# Patient Record
Sex: FEMALE | Race: OTHER | NOT HISPANIC OR LATINO | URBAN - METROPOLITAN AREA
[De-identification: names, ages, dates, MRNs, and addresses within clinical notes are randomized per-mention and may not be internally consistent; named-entity substitution may affect disease eponyms.]

---

## 2017-01-12 ENCOUNTER — OUTPATIENT (OUTPATIENT)
Dept: OUTPATIENT SERVICES | Facility: HOSPITAL | Age: 82
LOS: 1 days | End: 2017-01-12
Payer: MEDICARE

## 2017-01-12 PROCEDURE — 77067 SCR MAMMO BI INCL CAD: CPT

## 2017-01-12 PROCEDURE — G0202: CPT | Mod: 26

## 2017-03-28 ENCOUNTER — APPOINTMENT (OUTPATIENT)
Dept: HEART AND VASCULAR | Facility: CLINIC | Age: 82
End: 2017-03-28

## 2017-03-28 VITALS
WEIGHT: 155 LBS | SYSTOLIC BLOOD PRESSURE: 140 MMHG | DIASTOLIC BLOOD PRESSURE: 70 MMHG | HEIGHT: 60 IN | HEART RATE: 71 BPM | BODY MASS INDEX: 30.43 KG/M2

## 2017-06-26 ENCOUNTER — APPOINTMENT (OUTPATIENT)
Dept: PULMONOLOGY | Facility: CLINIC | Age: 82
End: 2017-06-26

## 2017-07-25 ENCOUNTER — APPOINTMENT (OUTPATIENT)
Dept: VASCULAR SURGERY | Facility: CLINIC | Age: 82
End: 2017-07-25

## 2017-07-28 ENCOUNTER — OUTPATIENT (OUTPATIENT)
Dept: OUTPATIENT SERVICES | Facility: HOSPITAL | Age: 82
LOS: 1 days | End: 2017-07-28
Payer: MEDICARE

## 2017-07-28 DIAGNOSIS — I25.9 CHRONIC ISCHEMIC HEART DISEASE, UNSPECIFIED: ICD-10-CM

## 2017-07-28 DIAGNOSIS — R60.9 EDEMA, UNSPECIFIED: ICD-10-CM

## 2017-07-28 PROCEDURE — 93306 TTE W/DOPPLER COMPLETE: CPT | Mod: 26

## 2017-07-28 PROCEDURE — 93306 TTE W/DOPPLER COMPLETE: CPT

## 2017-07-28 PROCEDURE — 93005 ELECTROCARDIOGRAM TRACING: CPT

## 2017-07-28 PROCEDURE — 93010 ELECTROCARDIOGRAM REPORT: CPT

## 2017-08-29 ENCOUNTER — APPOINTMENT (OUTPATIENT)
Dept: HEART AND VASCULAR | Facility: CLINIC | Age: 82
End: 2017-08-29

## 2017-08-29 ENCOUNTER — OUTPATIENT (OUTPATIENT)
Dept: OUTPATIENT SERVICES | Facility: HOSPITAL | Age: 82
LOS: 1 days | End: 2017-08-29
Payer: MEDICARE

## 2017-08-29 PROCEDURE — 77080 DXA BONE DENSITY AXIAL: CPT

## 2017-08-29 PROCEDURE — 77080 DXA BONE DENSITY AXIAL: CPT | Mod: 26

## 2017-10-31 ENCOUNTER — APPOINTMENT (OUTPATIENT)
Dept: HEART AND VASCULAR | Facility: CLINIC | Age: 82
End: 2017-10-31
Payer: MEDICARE

## 2017-10-31 PROCEDURE — 93280 PM DEVICE PROGR EVAL DUAL: CPT

## 2017-10-31 PROCEDURE — 99214 OFFICE O/P EST MOD 30 MIN: CPT | Mod: 25

## 2017-10-31 RX ORDER — TOBRAMYCIN 3 MG/ML
0.3 SOLUTION/ DROPS OPHTHALMIC
Qty: 5 | Refills: 0 | Status: ACTIVE | COMMUNITY
Start: 2017-05-05

## 2017-12-05 ENCOUNTER — APPOINTMENT (OUTPATIENT)
Dept: PULMONOLOGY | Facility: CLINIC | Age: 82
End: 2017-12-05
Payer: MEDICARE

## 2017-12-05 PROCEDURE — 99215 OFFICE O/P EST HI 40 MIN: CPT | Mod: 25

## 2018-01-24 ENCOUNTER — APPOINTMENT (OUTPATIENT)
Dept: ULTRASOUND IMAGING | Facility: HOSPITAL | Age: 83
End: 2018-01-24

## 2018-01-24 ENCOUNTER — APPOINTMENT (OUTPATIENT)
Dept: RADIOLOGY | Facility: CLINIC | Age: 83
End: 2018-01-24

## 2018-03-05 ENCOUNTER — APPOINTMENT (OUTPATIENT)
Dept: PULMONOLOGY | Facility: CLINIC | Age: 83
End: 2018-03-05
Payer: MEDICARE

## 2018-03-05 PROCEDURE — 94727 GAS DIL/WSHOT DETER LNG VOL: CPT

## 2018-03-05 PROCEDURE — ZZZZZ: CPT

## 2018-03-05 PROCEDURE — 94729 DIFFUSING CAPACITY: CPT

## 2018-03-05 PROCEDURE — 99214 OFFICE O/P EST MOD 30 MIN: CPT | Mod: 25

## 2018-03-05 PROCEDURE — 94010 BREATHING CAPACITY TEST: CPT

## 2018-03-06 ENCOUNTER — APPOINTMENT (OUTPATIENT)
Dept: ULTRASOUND IMAGING | Facility: HOSPITAL | Age: 83
End: 2018-03-06
Payer: MEDICARE

## 2018-03-06 ENCOUNTER — OUTPATIENT (OUTPATIENT)
Dept: OUTPATIENT SERVICES | Facility: HOSPITAL | Age: 83
LOS: 1 days | End: 2018-03-06
Payer: COMMERCIAL

## 2018-03-06 PROCEDURE — 76536 US EXAM OF HEAD AND NECK: CPT

## 2018-03-06 PROCEDURE — 71046 X-RAY EXAM CHEST 2 VIEWS: CPT | Mod: 26

## 2018-03-06 PROCEDURE — 71046 X-RAY EXAM CHEST 2 VIEWS: CPT

## 2018-03-06 PROCEDURE — 76536 US EXAM OF HEAD AND NECK: CPT | Mod: 26

## 2018-03-22 ENCOUNTER — OUTPATIENT (OUTPATIENT)
Dept: OUTPATIENT SERVICES | Facility: HOSPITAL | Age: 83
LOS: 1 days | End: 2018-03-22
Payer: MEDICARE

## 2018-03-22 ENCOUNTER — APPOINTMENT (OUTPATIENT)
Dept: CT IMAGING | Facility: HOSPITAL | Age: 83
End: 2018-03-22

## 2018-03-22 PROCEDURE — 71250 CT THORAX DX C-: CPT

## 2018-03-22 PROCEDURE — 71250 CT THORAX DX C-: CPT | Mod: 26

## 2018-03-29 ENCOUNTER — APPOINTMENT (OUTPATIENT)
Dept: PULMONOLOGY | Facility: CLINIC | Age: 83
End: 2018-03-29
Payer: MEDICARE

## 2018-03-29 PROCEDURE — 99215 OFFICE O/P EST HI 40 MIN: CPT | Mod: 25

## 2018-05-08 ENCOUNTER — APPOINTMENT (OUTPATIENT)
Dept: HEART AND VASCULAR | Facility: CLINIC | Age: 83
End: 2018-05-08
Payer: MEDICARE

## 2018-05-08 VITALS
WEIGHT: 168 LBS | HEIGHT: 60 IN | SYSTOLIC BLOOD PRESSURE: 146 MMHG | BODY MASS INDEX: 32.98 KG/M2 | HEART RATE: 85 BPM | DIASTOLIC BLOOD PRESSURE: 70 MMHG

## 2018-05-08 PROCEDURE — 93280 PM DEVICE PROGR EVAL DUAL: CPT

## 2018-05-22 ENCOUNTER — APPOINTMENT (OUTPATIENT)
Dept: ULTRASOUND IMAGING | Facility: HOSPITAL | Age: 83
End: 2018-05-22

## 2018-05-22 ENCOUNTER — APPOINTMENT (OUTPATIENT)
Dept: MAMMOGRAPHY | Facility: HOSPITAL | Age: 83
End: 2018-05-22

## 2018-05-23 ENCOUNTER — OUTPATIENT (OUTPATIENT)
Dept: OUTPATIENT SERVICES | Facility: HOSPITAL | Age: 83
LOS: 1 days | End: 2018-05-23
Payer: MEDICARE

## 2018-05-23 DIAGNOSIS — I25.10 ATHEROSCLEROTIC HEART DISEASE OF NATIVE CORONARY ARTERY WITHOUT ANGINA PECTORIS: ICD-10-CM

## 2018-05-23 DIAGNOSIS — R07.9 CHEST PAIN, UNSPECIFIED: ICD-10-CM

## 2018-05-23 DIAGNOSIS — R60.9 EDEMA, UNSPECIFIED: ICD-10-CM

## 2018-05-23 PROCEDURE — 93005 ELECTROCARDIOGRAM TRACING: CPT

## 2018-05-23 PROCEDURE — 93306 TTE W/DOPPLER COMPLETE: CPT | Mod: 26

## 2018-05-23 PROCEDURE — 93306 TTE W/DOPPLER COMPLETE: CPT

## 2018-05-23 PROCEDURE — 93010 ELECTROCARDIOGRAM REPORT: CPT

## 2018-08-23 ENCOUNTER — RESULT CHARGE (OUTPATIENT)
Age: 83
End: 2018-08-23

## 2018-08-24 ENCOUNTER — APPOINTMENT (OUTPATIENT)
Dept: PULMONOLOGY | Facility: CLINIC | Age: 83
End: 2018-08-24
Payer: MEDICARE

## 2018-08-24 ENCOUNTER — NON-APPOINTMENT (OUTPATIENT)
Age: 83
End: 2018-08-24

## 2018-08-24 VITALS
SYSTOLIC BLOOD PRESSURE: 110 MMHG | DIASTOLIC BLOOD PRESSURE: 70 MMHG | BODY MASS INDEX: 32.79 KG/M2 | WEIGHT: 167 LBS | TEMPERATURE: 98.9 F | OXYGEN SATURATION: 96 % | HEIGHT: 60 IN | HEART RATE: 86 BPM

## 2018-08-24 DIAGNOSIS — Z87.19 PERSONAL HISTORY OF OTHER DISEASES OF THE DIGESTIVE SYSTEM: ICD-10-CM

## 2018-08-24 DIAGNOSIS — Z87.448 PERSONAL HISTORY OF OTHER DISEASES OF URINARY SYSTEM: ICD-10-CM

## 2018-08-24 DIAGNOSIS — Z86.39 PERSONAL HISTORY OF OTHER ENDOCRINE, NUTRITIONAL AND METABOLIC DISEASE: ICD-10-CM

## 2018-08-24 DIAGNOSIS — Z87.11 PERSONAL HISTORY OF PEPTIC ULCER DISEASE: ICD-10-CM

## 2018-08-24 DIAGNOSIS — Z86.79 PERSONAL HISTORY OF OTHER DISEASES OF THE CIRCULATORY SYSTEM: ICD-10-CM

## 2018-08-24 PROCEDURE — 99397 PER PM REEVAL EST PAT 65+ YR: CPT | Mod: 25

## 2018-08-24 PROCEDURE — 36415 COLL VENOUS BLD VENIPUNCTURE: CPT

## 2018-08-24 PROCEDURE — 93000 ELECTROCARDIOGRAM COMPLETE: CPT

## 2018-08-24 NOTE — ASSESSMENT
[FreeTextEntry1] : Annual exam - Follow on blood work.  EKG NSR. She was sent for mammogram.  Pt compliant with diet and exercise.  \par Screenings due:\par - Colonoscopy 12/2019\par - Dexa 9/2019\par - US thyroid 3/2019\par -  PFT 3/2019\par -  CXR 3/2019\par \par Edema - stable no clinical evidence of DVT or CHF.  There is slight swelling to left ankle and uses Lasix as needed.  She is on pradaxa.  \par \par Asthma - She is clinically stable and rarely requiring her JUDITH.  No need for systemic steroids.  \par \par Dyspnea with excretion - most likely related  to weight gain and deconditioning.  Pt to monitor diet and exercise.\par \par Ulcer - No signs of GI bleeding.  Follow by abbey Morales for diverticulosis \par \par Sarcoidosis - Stable PFT. No signs of exacerbation.  Follow with yearly PFTS. \par \par hypothyroidism - Cont with Synthroid and follow lab work\par \par Hyperlipidemia - Follow lab work and tolerating statin \par \par Afib - stable and currently on Pradaxa \par \par \par \par

## 2018-08-24 NOTE — END OF VISIT
[FreeTextEntry3] : patient seen and examined with LISA Wright [>50% of Time Spent on Counseling and Coordination of Care for  ___] : Greater than 50% of the encounter time was spent on counseling and coordination of care for [unfilled] [Time Spent: ___ minutes] : I have spent [unfilled] minutes of face to face time with the patient

## 2018-08-24 NOTE — HISTORY OF PRESENT ILLNESS
[FreeTextEntry1] : pt here for annual visit.  [de-identified] : She is doing well.  She is trying to watching her diet.  She does have SOB, she using the Proair as needed.  She uses it prior to going for walk.  She states the heat is making her more SOB.  She is avoiding NA.

## 2018-08-24 NOTE — HEALTH RISK ASSESSMENT
[No falls in past year] : Patient reported no falls in the past year [0] : 2) Feeling down, depressed, or hopeless: Not at all (0) [(PHQ-2) Unable to screen] : PHQ-2: unable to screen [] : No

## 2018-08-27 ENCOUNTER — OTHER (OUTPATIENT)
Age: 83
End: 2018-08-27

## 2018-08-27 ENCOUNTER — RESULT REVIEW (OUTPATIENT)
Age: 83
End: 2018-08-27

## 2018-08-27 LAB
25(OH)D3 SERPL-MCNC: 29.5 NG/ML
ALBUMIN SERPL ELPH-MCNC: 4.4 G/DL
ALP BLD-CCNC: 81 U/L
ALT SERPL-CCNC: 15 U/L
ANION GAP SERPL CALC-SCNC: 17 MMOL/L
APPEARANCE: CLEAR
AST SERPL-CCNC: 22 U/L
BASOPHILS # BLD AUTO: 0.04 K/UL
BASOPHILS NFR BLD AUTO: 0.5 %
BILIRUB SERPL-MCNC: 0.7 MG/DL
BILIRUBIN URINE: NEGATIVE
BLOOD URINE: NEGATIVE
BUN SERPL-MCNC: 12 MG/DL
CALCIUM SERPL-MCNC: 9.1 MG/DL
CHLORIDE SERPL-SCNC: 100 MMOL/L
CHOLEST SERPL-MCNC: 151 MG/DL
CHOLEST/HDLC SERPL: 2.3 RATIO
CO2 SERPL-SCNC: 22 MMOL/L
COLOR: YELLOW
CREAT SERPL-MCNC: 1.07 MG/DL
EOSINOPHIL # BLD AUTO: 0.25 K/UL
EOSINOPHIL NFR BLD AUTO: 3.3 %
GLUCOSE QUALITATIVE U: NEGATIVE MG/DL
GLUCOSE SERPL-MCNC: 97 MG/DL
HBA1C MFR BLD HPLC: 6.1 %
HCT VFR BLD CALC: 41.3 %
HDLC SERPL-MCNC: 65 MG/DL
HGB BLD-MCNC: 13.2 G/DL
IMM GRANULOCYTES NFR BLD AUTO: 0.3 %
KETONES URINE: NEGATIVE
LDLC SERPL CALC-MCNC: 69 MG/DL
LEUKOCYTE ESTERASE URINE: NEGATIVE
LYMPHOCYTES # BLD AUTO: 2.35 K/UL
LYMPHOCYTES NFR BLD AUTO: 31.2 %
MAN DIFF?: NORMAL
MCHC RBC-ENTMCNC: 30.4 PG
MCHC RBC-ENTMCNC: 32 GM/DL
MCV RBC AUTO: 95.2 FL
MONOCYTES # BLD AUTO: 0.71 K/UL
MONOCYTES NFR BLD AUTO: 9.4 %
NEUTROPHILS # BLD AUTO: 4.16 K/UL
NEUTROPHILS NFR BLD AUTO: 55.3 %
NITRITE URINE: NEGATIVE
PH URINE: 6
PLATELET # BLD AUTO: 285 K/UL
POTASSIUM SERPL-SCNC: 4.5 MMOL/L
PROT SERPL-MCNC: 6.9 G/DL
PROTEIN URINE: NEGATIVE MG/DL
RBC # BLD: 4.34 M/UL
RBC # FLD: 13.7 %
SODIUM SERPL-SCNC: 139 MMOL/L
SPECIFIC GRAVITY URINE: 1
T3 SERPL-MCNC: 101 NG/DL
T4 SERPL-MCNC: 11.1 UG/DL
TRIGL SERPL-MCNC: 84 MG/DL
TSH SERPL-ACNC: 1.19 UIU/ML
UROBILINOGEN URINE: NEGATIVE MG/DL
WBC # FLD AUTO: 7.53 K/UL

## 2018-09-24 ENCOUNTER — APPOINTMENT (OUTPATIENT)
Dept: PULMONOLOGY | Facility: CLINIC | Age: 83
End: 2018-09-24
Payer: MEDICARE

## 2018-09-24 VITALS
WEIGHT: 164 LBS | BODY MASS INDEX: 32.2 KG/M2 | TEMPERATURE: 98.9 F | HEIGHT: 60 IN | HEART RATE: 86 BPM | DIASTOLIC BLOOD PRESSURE: 60 MMHG | OXYGEN SATURATION: 97 % | SYSTOLIC BLOOD PRESSURE: 126 MMHG

## 2018-09-24 PROCEDURE — 99214 OFFICE O/P EST MOD 30 MIN: CPT

## 2018-09-26 ENCOUNTER — RX RENEWAL (OUTPATIENT)
Age: 83
End: 2018-09-26

## 2018-09-27 ENCOUNTER — RX RENEWAL (OUTPATIENT)
Age: 83
End: 2018-09-27

## 2018-09-27 RX ORDER — OFLOXACIN OTIC 3 MG/ML
0.3 SOLUTION AURICULAR (OTIC) TWICE DAILY
Qty: 1 | Refills: 0 | Status: DISCONTINUED | COMMUNITY
Start: 2018-09-26 | End: 2018-09-27

## 2018-09-30 LAB — RAPID RVP RESULT: NOT DETECTED

## 2018-10-31 ENCOUNTER — OUTPATIENT (OUTPATIENT)
Dept: OUTPATIENT SERVICES | Facility: HOSPITAL | Age: 83
LOS: 1 days | End: 2018-10-31
Payer: MEDICARE

## 2018-10-31 ENCOUNTER — APPOINTMENT (OUTPATIENT)
Dept: MAMMOGRAPHY | Facility: HOSPITAL | Age: 83
End: 2018-10-31
Payer: MEDICARE

## 2018-10-31 PROCEDURE — 77067 SCR MAMMO BI INCL CAD: CPT

## 2018-10-31 PROCEDURE — 77063 BREAST TOMOSYNTHESIS BI: CPT | Mod: 26

## 2018-10-31 PROCEDURE — 77067 SCR MAMMO BI INCL CAD: CPT | Mod: 26

## 2018-10-31 PROCEDURE — 77063 BREAST TOMOSYNTHESIS BI: CPT

## 2018-11-06 ENCOUNTER — RESULT REVIEW (OUTPATIENT)
Age: 83
End: 2018-11-06

## 2018-11-06 ENCOUNTER — OUTPATIENT (OUTPATIENT)
Dept: OUTPATIENT SERVICES | Facility: HOSPITAL | Age: 83
LOS: 1 days | End: 2018-11-06
Payer: MEDICARE

## 2018-11-06 ENCOUNTER — RX RENEWAL (OUTPATIENT)
Age: 83
End: 2018-11-06

## 2018-11-06 ENCOUNTER — APPOINTMENT (OUTPATIENT)
Dept: ULTRASOUND IMAGING | Facility: HOSPITAL | Age: 83
End: 2018-11-06

## 2018-11-06 ENCOUNTER — APPOINTMENT (OUTPATIENT)
Dept: HEART AND VASCULAR | Facility: CLINIC | Age: 83
End: 2018-11-06
Payer: MEDICARE

## 2018-11-06 VITALS — SYSTOLIC BLOOD PRESSURE: 152 MMHG | HEART RATE: 95 BPM | DIASTOLIC BLOOD PRESSURE: 77 MMHG

## 2018-11-06 PROCEDURE — 76641 ULTRASOUND BREAST COMPLETE: CPT

## 2018-11-06 PROCEDURE — 76641 ULTRASOUND BREAST COMPLETE: CPT | Mod: 26,50

## 2018-11-06 PROCEDURE — 77065 DX MAMMO INCL CAD UNI: CPT | Mod: 26,LT

## 2018-11-06 PROCEDURE — 99214 OFFICE O/P EST MOD 30 MIN: CPT | Mod: 25

## 2018-11-06 PROCEDURE — 93280 PM DEVICE PROGR EVAL DUAL: CPT

## 2018-11-06 PROCEDURE — 77065 DX MAMMO INCL CAD UNI: CPT

## 2018-11-06 NOTE — PROCEDURE
[No] : not [NSR] : normal sinus rhythm [Pacemaker] : pacemaker [Voltage: ___ volts] : Voltage was [unfilled] volts [Lead Imp:  ___ohms] : lead impedance was [unfilled] ohms [Sensing Amplitude ___mv] : sensing amplitude was [unfilled] mv [___V @] : [unfilled] V [___ ms] : [unfilled] ms [None] : none [Asense-Vsense ___ %] : Asense-Vsense [unfilled]% [Apace-Vsense ___ %] : Apace-Vsense [unfilled]% [de-identified] : Medtronic [de-identified] : Ana [de-identified] : 12/17/12 [de-identified] : AAIR-DDDR [de-identified] : 53-74 [de-identified] : longevity 5.5 y [de-identified] : 1 PAF episode 11/4/18 - 7 hours duration- only AEGM available\par Reprogrammed for [facility] EGMs

## 2018-11-06 NOTE — PHYSICAL EXAM
[General Appearance - Well Developed] : well developed [Normal Appearance] : normal appearance [Well Groomed] : well groomed [General Appearance - Well Nourished] : well nourished [No Deformities] : no deformities [General Appearance - In No Acute Distress] : no acute distress [Left Infraclavicular] : left infraclavicular area [Clean] : clean [Dry] : dry [Well-Healed] : well-healed [Palpable Crepitus] : no palpable crepitus [Bleeding] : no active bleeding [Foul Odor] : no foul smell [Purulent Drainage] : no purulent drainage [Serosanguineous Drainage] : no serosanquineous drainage [Serous Drainage] : no serous drainage [Erythema] : not erythematous [Warm] : not warm [Tender] : not tender [Indurated] : not indurated [Fluctuant] : not fluctuant

## 2018-11-06 NOTE — REASON FOR VISIT
[Follow-up Device Check] : follow-up device check visit [Arrhythmias (seen on stored data)] : arrhythmias seen on stored data

## 2018-11-06 NOTE — HISTORY OF PRESENT ILLNESS
[de-identified] : This is an 83 year old female with SSS S/P PPM, HTN, Afib, CAD and TIA/CVA. She was previously on Xarelto.  Records reflect an episode of gum bleeding (requiring packing) while on Xarelto, as well as possible GI ulcer and significant bruising.  Later started on Eliquis and reported significant bruising.  Eliquis was switched to Pradaxa and she continues to feel the bruising has been less on this agent.  Sees neurology and GI (Dr. uGtierrez).  Sees Dr. Fatima for CAD hx stent.\par \par She had an episode of palpitations after getting out of a warm magnesium salt bath two days ago.  No CP, dizziness, presyncope/syncope.

## 2018-11-20 ENCOUNTER — APPOINTMENT (OUTPATIENT)
Dept: PULMONOLOGY | Facility: CLINIC | Age: 83
End: 2018-11-20
Payer: MEDICARE

## 2018-11-20 VITALS
SYSTOLIC BLOOD PRESSURE: 150 MMHG | DIASTOLIC BLOOD PRESSURE: 90 MMHG | HEART RATE: 87 BPM | HEIGHT: 60 IN | BODY MASS INDEX: 32.98 KG/M2 | TEMPERATURE: 98.8 F | OXYGEN SATURATION: 96 % | WEIGHT: 168 LBS

## 2018-11-20 PROCEDURE — 99214 OFFICE O/P EST MOD 30 MIN: CPT

## 2018-12-05 ENCOUNTER — APPOINTMENT (OUTPATIENT)
Dept: PULMONOLOGY | Facility: CLINIC | Age: 83
End: 2018-12-05
Payer: MEDICARE

## 2018-12-13 ENCOUNTER — APPOINTMENT (OUTPATIENT)
Dept: PULMONOLOGY | Facility: CLINIC | Age: 83
End: 2018-12-13
Payer: MEDICARE

## 2018-12-13 VITALS
WEIGHT: 162 LBS | HEIGHT: 60 IN | HEART RATE: 86 BPM | SYSTOLIC BLOOD PRESSURE: 130 MMHG | DIASTOLIC BLOOD PRESSURE: 70 MMHG | OXYGEN SATURATION: 97 % | BODY MASS INDEX: 31.8 KG/M2 | TEMPERATURE: 98.6 F

## 2018-12-13 PROCEDURE — 99214 OFFICE O/P EST MOD 30 MIN: CPT

## 2018-12-13 NOTE — HISTORY OF PRESENT ILLNESS
[FreeTextEntry1] : she wants to check on her lungs before she leaves to [place] [de-identified] : She is doing okay. She is not wheezing. She's walking no difficulty. She wants to renew her medication. She is traveling to [place] and wants to get the okay before she leaves

## 2018-12-13 NOTE — ASSESSMENT
[FreeTextEntry1] : Coronary artery disease.\par \par  Patient is clinically stable and no evidence of angina nor CHF. She is to continue on the current regimen.\par \par Atrial fibrillation\par \par Patient was in sinus and rate is controlled with beta blockers. The patient is on pyridoxine.\par \par Hypertension\par \par The blood pressure was controlled and she is to continue on amlodipine and metoprolol. She is to continue on Lasix every other day\par \par 2 asthma\par \par Patient is clinically stable and should continue on albuterol as-needed basis in addition to Advair\par \par Sarcoidosis\par \par Patient is stable no evidence of acute episode\par \par TIA\par \par No evidence of neurologic deficit and the patient is to followup with neurology when she comes back from [place]\par \par Patient is cleared to travel to [place]

## 2019-01-29 ENCOUNTER — APPOINTMENT (OUTPATIENT)
Dept: PULMONOLOGY | Facility: CLINIC | Age: 84
End: 2019-01-29
Payer: MEDICARE

## 2019-01-29 VITALS
WEIGHT: 168 LBS | BODY MASS INDEX: 32.98 KG/M2 | SYSTOLIC BLOOD PRESSURE: 120 MMHG | DIASTOLIC BLOOD PRESSURE: 60 MMHG | OXYGEN SATURATION: 96 % | HEIGHT: 60 IN | RESPIRATION RATE: 13 BRPM | HEART RATE: 89 BPM

## 2019-01-29 PROCEDURE — 99214 OFFICE O/P EST MOD 30 MIN: CPT

## 2019-01-29 NOTE — HISTORY OF PRESENT ILLNESS
[FreeTextEntry1] : pt is concerned that she has bronchitis and/or PNA [de-identified] : She has complaints of wheezing and coughing that started a week ago when she was in [place]. There is a dog where she live in [place] and was not sure if it was allergy.  Denies any fever.  SOB is worse than normal with walking.  He pace has been reduced and she takes twice as long to go places.  \par \par She took amlodpine x 2 last night thinking it was antibiotic.  We discussed her medications.  She is concerned she went through the metal detector with the pace maker, but denies any chest pain or new palpitations.

## 2019-01-29 NOTE — ASSESSMENT
[FreeTextEntry1] : Cough\par \par Start Medrol dose pack and Flonase BID.  Pt using her JUDITH more often 2 x a day.  Follow on RVP.  Lung exam unremarkable and oxygen saturation WNL.  Most likely related to asthma or viral in nature.  No need for antibiotics at this time.\par \par Coronary artery disease.\par \par  Patient is clinically stable and no evidence of angina nor CHF. She is to continue on the current regimen.\par \par Atrial fibrillation\par \par Patient was in sinus and rate is controlled with beta blockers. The patient is on pradaxa without any signs of bleeding.\par \par Hypertension\par \par The blood pressure was controlled and she is to continue on amlodipine and metoprolol. She is to continue on Lasix every other day.  Had a long discussion about amlodipine and it being a CCB not an antibiotic.\par \par asthma\par \par Patient is continue on albuterol as-needed basis in addition to Advair.  Start medrol dose pack and flonase.\par \par Sarcoidosis\par \par Patient is stable no evidence of acute episode\par \par TIA\par \par No evidence of neurologic deficit and the patient is to followup with neurology \par \par PFT due 3/2019\par colonoscopy due 2019 she is going to call Dr. Gutierrez.\par Last CT scan chest 3/2018\par Labs next visit

## 2019-02-01 ENCOUNTER — RESULT REVIEW (OUTPATIENT)
Age: 84
End: 2019-02-01

## 2019-02-01 ENCOUNTER — RESULT CHARGE (OUTPATIENT)
Age: 84
End: 2019-02-01

## 2019-02-01 LAB — RAPID RVP RESULT: NOT DETECTED

## 2019-04-29 ENCOUNTER — LABORATORY RESULT (OUTPATIENT)
Age: 84
End: 2019-04-29

## 2019-04-30 ENCOUNTER — APPOINTMENT (OUTPATIENT)
Dept: HEART AND VASCULAR | Facility: CLINIC | Age: 84
End: 2019-04-30
Payer: MEDICARE

## 2019-04-30 ENCOUNTER — APPOINTMENT (OUTPATIENT)
Dept: NEUROLOGY | Facility: CLINIC | Age: 84
End: 2019-04-30
Payer: MEDICARE

## 2019-04-30 ENCOUNTER — APPOINTMENT (OUTPATIENT)
Dept: HEART AND VASCULAR | Facility: CLINIC | Age: 84
End: 2019-04-30

## 2019-04-30 ENCOUNTER — APPOINTMENT (OUTPATIENT)
Dept: PULMONOLOGY | Facility: CLINIC | Age: 84
End: 2019-04-30
Payer: MEDICARE

## 2019-04-30 VITALS
SYSTOLIC BLOOD PRESSURE: 130 MMHG | BODY MASS INDEX: 32.2 KG/M2 | TEMPERATURE: 98.6 F | HEART RATE: 93 BPM | OXYGEN SATURATION: 95 % | HEIGHT: 60 IN | DIASTOLIC BLOOD PRESSURE: 80 MMHG | WEIGHT: 164 LBS

## 2019-04-30 VITALS — DIASTOLIC BLOOD PRESSURE: 72 MMHG | SYSTOLIC BLOOD PRESSURE: 156 MMHG | HEART RATE: 94 BPM

## 2019-04-30 PROCEDURE — 36415 COLL VENOUS BLD VENIPUNCTURE: CPT

## 2019-04-30 PROCEDURE — 99213 OFFICE O/P EST LOW 20 MIN: CPT | Mod: 25

## 2019-04-30 PROCEDURE — 99214 OFFICE O/P EST MOD 30 MIN: CPT | Mod: 25

## 2019-04-30 PROCEDURE — 93280 PM DEVICE PROGR EVAL DUAL: CPT

## 2019-04-30 PROCEDURE — 99204 OFFICE O/P NEW MOD 45 MIN: CPT

## 2019-04-30 NOTE — HISTORY OF PRESENT ILLNESS
[FreeTextEntry1] : Pt presents today for follow up visit. Pt with complaints of twitching.to left side of face and numbness/tingling in the 2 fingers to left hand.  [de-identified] : She is doing well.  She feels something is wrong however in 2011 she had twitching to the face.  Now she is having a warmth on the left side of the face since Saturday.  The sensation is intermittent with numbness and tingling in the 2 finger to left hand.  Pt without vision change, headaches, no numbness or tingling to area on face.  She feels her balance is off, without any falls.  She refuses to use a cane.\par \par Her breathing is good. she is rarely requiring the SAB and using the Advair.  Denies fever, change in appetite, change in BM, chest pain or palpations. \par

## 2019-04-30 NOTE — ASSESSMENT
[FreeTextEntry1] : TIA\par \par Pt sent to neurology to be seen today due to numbness and tingling in hand and facial warmth.  She is to be evaluated for TIA and has possible scan. Pt is currently on Pradaxa \par \par Cough\par \par Improved \par \par Coronary artery disease.\par \par  Patient is clinically stable and no evidence of angina nor CHF. She is to continue on the current regimen.\par \par Atrial fibrillation\par \par Patient was in sinus and rate is controlled with beta blockers. The patient is on pradaxa without any signs of bleeding.\par \par Hypertension\par \par The blood pressure was controlled and she is to continue on amlodipine and metoprolol. She is to continue on Lasix every other day.  Had a long discussion about amlodipine and it being a CCB not an antibiotic.\par \par asthma\par \par Patient is continue on albuterol as-needed basis in addition to Advair.  she rarely using her JUDITH\par \par Sarcoidosis\par \par Patient is stable no evidence of acute episode.  Follow with repeat PFT \par \par \par PFT due 3/2019 (ordered)\par colonoscopy due 2019 she is going to call Dr. Gutierrez. (ordered) \par Last CT scan chest 3/2018\par Follow on labs \par Referred to dermatology \par pt due to mammogram ordered mammogram

## 2019-05-01 LAB
25(OH)D3 SERPL-MCNC: 32.7 NG/ML
ALBUMIN SERPL ELPH-MCNC: 4.6 G/DL
ALP BLD-CCNC: 89 U/L
ALT SERPL-CCNC: 17 U/L
ANION GAP SERPL CALC-SCNC: 13 MMOL/L
APPEARANCE: CLEAR
AST SERPL-CCNC: 19 U/L
BASOPHILS # BLD AUTO: 0.09 K/UL
BASOPHILS NFR BLD AUTO: 1 %
BILIRUB SERPL-MCNC: 0.4 MG/DL
BILIRUBIN URINE: NEGATIVE
BLOOD URINE: NEGATIVE
BUN SERPL-MCNC: 8 MG/DL
CALCIUM SERPL-MCNC: 10 MG/DL
CHLORIDE SERPL-SCNC: 104 MMOL/L
CHOLEST SERPL-MCNC: 157 MG/DL
CHOLEST/HDLC SERPL: 3 RATIO
CO2 SERPL-SCNC: 26 MMOL/L
COLOR: NORMAL
CREAT SERPL-MCNC: 0.99 MG/DL
EOSINOPHIL # BLD AUTO: 0.2 K/UL
EOSINOPHIL NFR BLD AUTO: 2.1 %
ESTIMATED AVERAGE GLUCOSE: 126 MG/DL
GLUCOSE QUALITATIVE U: NEGATIVE
GLUCOSE SERPL-MCNC: 92 MG/DL
HBA1C MFR BLD HPLC: 6 %
HCT VFR BLD CALC: 45.6 %
HDLC SERPL-MCNC: 53 MG/DL
HGB BLD-MCNC: 14.4 G/DL
IMM GRANULOCYTES NFR BLD AUTO: 0.3 %
KETONES URINE: NEGATIVE
LDLC SERPL CALC-MCNC: 73 MG/DL
LEUKOCYTE ESTERASE URINE: ABNORMAL
LYMPHOCYTES # BLD AUTO: 3 K/UL
LYMPHOCYTES NFR BLD AUTO: 31.9 %
MAN DIFF?: NORMAL
MCHC RBC-ENTMCNC: 30 PG
MCHC RBC-ENTMCNC: 31.6 GM/DL
MCV RBC AUTO: 95 FL
MONOCYTES # BLD AUTO: 1.02 K/UL
MONOCYTES NFR BLD AUTO: 10.8 %
NEUTROPHILS # BLD AUTO: 5.07 K/UL
NEUTROPHILS NFR BLD AUTO: 53.9 %
NITRITE URINE: NEGATIVE
PH URINE: 6
PLATELET # BLD AUTO: 282 K/UL
POTASSIUM SERPL-SCNC: 4.6 MMOL/L
PROT SERPL-MCNC: 7.1 G/DL
PROTEIN URINE: NEGATIVE
RBC # BLD: 4.8 M/UL
RBC # FLD: 13.2 %
SODIUM SERPL-SCNC: 143 MMOL/L
SPECIFIC GRAVITY URINE: 1.01
T3 SERPL-MCNC: 154 NG/DL
T4 SERPL-MCNC: 14.2 UG/DL
TRIGL SERPL-MCNC: 155 MG/DL
TSH SERPL-ACNC: 0.01 UIU/ML
UROBILINOGEN URINE: NORMAL
WBC # FLD AUTO: 9.41 K/UL

## 2019-05-01 NOTE — HISTORY OF PRESENT ILLNESS
[FreeTextEntry1] : Patient is a 83-year-old, R-handed woman with medical history of sarcoidosis, HTN, HLD, afib on Pradaxa, hypothyroidism, CAD s/p 2 stents and pacemaker, and TIA in 2014 with L facial twitching who was referred by Dr. Riggs (PCP and pulmonologist) for evaluation of tingling of the L face and 4th and 5th fingers of the L hand. 4 days ago, patient started having new "tingling" near the distal corner of her L eye and the 4th and 5th fingers of the L hand. Today, she noticed that tingling had progressed to involve the entire left side of her face. Her face has also started to feel warm. \par \par Of note, 3 weeks ago, patient noticed she started having intermittent balance issues while walking, reporting she has been "walking like a drunken ." She continues to walk without assistance. Patient otherwise denies headache, vision changes, nausea/vomiting, bladder/bowel changes.

## 2019-05-01 NOTE — ASSESSMENT
[FreeTextEntry1] : Patient is a 83-year-old, R-handed woman with medical history of sarcoidosis, HTN, HLD, afib on Pradaxa, hypothyroidism, CAD s/p 2 stents and pacemaker, and TIA in 2014 with L facial twitching who was referred by Dr. Riggs (PCP and pulmonologist) for evaluation of tingling of the L face and 4th and 5th fingers of the L hand.\par \par - CT head to rule out hemorrhage given patient is taking Pradaxa. Of note, patient reports she cannot get MRIs because of her PPM. CT today; if stable- patient to continue pradaxa. \par

## 2019-05-01 NOTE — PHYSICAL EXAM
[FreeTextEntry1] : Constitutional: alert, in no acute distress, well nourished and well developed\par Psychiatric:  insight and judgment were intact\par \par Neurologic: \par Mental Status: The patient is alert, attentive, and oriented to person, place, and time. Speech is clear and fluent with good repetition, comprehension, and naming.  \par Cranial nerves:\par CN II: Visual fields are full to confrontation. Fundoscopic exam is normal with sharp discs and no vascular changes.Visual acuity is intact. \par CN III, IV, VI: EOMI, PERRLA\par CN V: Facial sensation is intact to pinprick in all 3 divisions bilaterally. ++slight numbness on left side (epithalmic). \par CN VII: Face is symmetric with normal eye closure and smile.\par CN VIII: Hearing is normal to rubbing fingers\par CN IX, X: Palate elevates symmetrically. Phonation is normal.\par CN XI: Head turning and shoulder shrug are intact and symmetric.\par CN XII: Tongue is midline with normal movements and no atrophy and no deviation with protrusion.\par Motor: There is no pronator drift of out-stretched arms. Muscle bulk and tone is normal. Strength is full bilaterally 5/5 on both UE and both LE. \par Sensation: light touch is intact; pinprick intact; vibration b/l intact.\par Reflexes:Reflexes are 2+ and symmetric at the biceps, triceps, knees, and ankles.\par Coordination: Rapid alternating movements and fine finger movements are intact. Tremor on finger-to-nose.++dysmetria on left side.  There are no abnormal or extraneous movements. Negative Romberg. \par Gait/Stance: Posture is normal. Gait has slight imbalance when walking. \par \par Eyes: sclera and conjunctiva were normal.\par \par

## 2019-05-02 ENCOUNTER — NON-APPOINTMENT (OUTPATIENT)
Age: 84
End: 2019-05-02

## 2019-05-03 NOTE — PROCEDURE
[No] : not [NSR] : normal sinus rhythm [Pacemaker] : pacemaker [Voltage: ___ volts] : Voltage was [unfilled] volts [Lead Imp:  ___ohms] : lead impedance was [unfilled] ohms [Sensing Amplitude ___mv] : sensing amplitude was [unfilled] mv [___V @] : [unfilled] V [___ ms] : [unfilled] ms [None] : none [Asense-Vsense ___ %] : Asense-Vsense [unfilled]% [Apace-Vsense ___ %] : Apace-Vsense [unfilled]% [de-identified] : Medtronic [de-identified] : Ana [de-identified] : 12/17/12 [de-identified] : AAIR-DDDR [de-identified] : 58-92 [de-identified] : longevity 5 y [de-identified] : Longest AF episode ~ 5.5 hours 2/24/19 . \par [place] 0.4%

## 2019-05-03 NOTE — HISTORY OF PRESENT ILLNESS
[de-identified] : This is an 83 year old female with SSS S/P PPM, HTN, Afib, CAD and TIA/CVA. She was previously on Xarelto.  Records reflect an episode of gum bleeding (requiring packing) while on Xarelto, as well as possible GI ulcer and significant bruising.  Later started on Eliquis and reported significant bruising.  Eliquis was switched to Pradaxa and she continues to feel the bruising has been less on this agent.  Sees neurology and GI (Dr. Gutierrez).  Sees Dr. Fatima for CAD hx stent.\par \par Evaluated by Dr. Rick today with complaint of gait instability - scheduled for CT Head after this visit.

## 2019-05-06 ENCOUNTER — EMERGENCY (EMERGENCY)
Facility: HOSPITAL | Age: 84
LOS: 1 days | Discharge: HOME | End: 2019-05-06
Attending: EMERGENCY MEDICINE | Admitting: EMERGENCY MEDICINE
Payer: MEDICARE

## 2019-05-06 VITALS
SYSTOLIC BLOOD PRESSURE: 114 MMHG | HEART RATE: 65 BPM | RESPIRATION RATE: 17 BRPM | DIASTOLIC BLOOD PRESSURE: 71 MMHG | TEMPERATURE: 98 F

## 2019-05-06 VITALS
OXYGEN SATURATION: 97 % | WEIGHT: 164.02 LBS | SYSTOLIC BLOOD PRESSURE: 119 MMHG | RESPIRATION RATE: 17 BRPM | TEMPERATURE: 98 F | DIASTOLIC BLOOD PRESSURE: 74 MMHG | HEART RATE: 72 BPM

## 2019-05-06 PROCEDURE — 99283 EMERGENCY DEPT VISIT LOW MDM: CPT

## 2019-05-06 PROCEDURE — 99284 EMERGENCY DEPT VISIT MOD MDM: CPT

## 2019-05-06 NOTE — ED ADULT NURSE NOTE - NSIMPLEMENTINTERV_GEN_ALL_ED
Implemented All Universal Safety Interventions:  [place] to call system. Call bell, personal items and telephone within reach. Instruct patient to call for assistance. Room bathroom lighting operational. Non-slip footwear when patient is off stretcher. Physically safe environment: no spills, clutter or unnecessary equipment. Stretcher in lowest position, wheels locked, appropriate side rails in place.

## 2019-05-06 NOTE — ED PROVIDER NOTE - CLINICAL SUMMARY MEDICAL DECISION MAKING FREE TEXT BOX
84 yo F, recently being evaluated and treated outpatient for headaches, c/o headache since resolved. Patient asking to be discharged. I have explained to the patient that she should only take her medication as directed by her Physicians for safety reasons. Patient says she understands. Discharge to home.

## 2019-05-06 NOTE — ED ADULT NURSE NOTE - OBJECTIVE STATEMENT
Pt received with complaint of elevated BP this morning with an episode of Afib and palpitation as per pt. Pt states she doubled on her BP medication on a period of 3 hours. Pt denies any headache, chest pain or shortness of breathe currently. Pt evaluated by provider, pending plan of care. EKG done at triage.

## 2019-05-06 NOTE — ED PROVIDER NOTE - OBJECTIVE STATEMENT
84 yo F, presenting to the ED for a headache today that resolved prior to arrival. Patient reports she has been having headaches for the past few months and that she recently saw a neurologist for her headaches and was started on medication. Patient states she had a headache similar to prior this morning and took double her amount of 82 yo F, presenting to the ED for a headache today that resolved prior to arrival. Patient reports she has been having headaches for the past few months and that she recently saw a neurologist for her headaches and was started on medication. Patient states she had a headache similar to prior this morning and took double her amount of metoprolol this morning; she was not instructed to do this, she did it on her own. She said her headache decreased and is now gone. Patient is without complaints and is asking to leave. "I feel back to normal."

## 2019-05-06 NOTE — ED ADULT TRIAGE NOTE - CHIEF COMPLAINT QUOTE
Patient, PMHx of HTN and Afib, complaining of hypertension and HA.  Patient states she took an extra dose of metoprolol.  Patient denies any CP, SOB, dizziness, numbness, tingling, unilateral weakness, changes in vision or any other complaints.  EKG in progress.

## 2019-05-09 DIAGNOSIS — I10 ESSENTIAL (PRIMARY) HYPERTENSION: ICD-10-CM

## 2019-05-09 DIAGNOSIS — Z51.81 ENCOUNTER FOR THERAPEUTIC DRUG LEVEL MONITORING: ICD-10-CM

## 2019-05-09 DIAGNOSIS — Z91.040 LATEX ALLERGY STATUS: ICD-10-CM

## 2019-05-09 DIAGNOSIS — Z88.5 ALLERGY STATUS TO NARCOTIC AGENT: ICD-10-CM

## 2019-07-08 ENCOUNTER — RX RENEWAL (OUTPATIENT)
Age: 84
End: 2019-07-08

## 2019-07-24 ENCOUNTER — FORM ENCOUNTER (OUTPATIENT)
Age: 84
End: 2019-07-24

## 2019-07-25 ENCOUNTER — OUTPATIENT (OUTPATIENT)
Dept: OUTPATIENT SERVICES | Facility: HOSPITAL | Age: 84
LOS: 1 days | End: 2019-07-25
Payer: MEDICARE

## 2019-07-25 ENCOUNTER — APPOINTMENT (OUTPATIENT)
Dept: PULMONOLOGY | Facility: CLINIC | Age: 84
End: 2019-07-25
Payer: MEDICARE

## 2019-07-25 VITALS
BODY MASS INDEX: 31.22 KG/M2 | DIASTOLIC BLOOD PRESSURE: 76 MMHG | TEMPERATURE: 98.5 F | OXYGEN SATURATION: 96 % | HEART RATE: 89 BPM | WEIGHT: 159 LBS | SYSTOLIC BLOOD PRESSURE: 126 MMHG | HEIGHT: 60 IN

## 2019-07-25 PROCEDURE — 71046 X-RAY EXAM CHEST 2 VIEWS: CPT

## 2019-07-25 PROCEDURE — 99214 OFFICE O/P EST MOD 30 MIN: CPT | Mod: 25

## 2019-07-25 PROCEDURE — 36415 COLL VENOUS BLD VENIPUNCTURE: CPT

## 2019-07-25 PROCEDURE — 71046 X-RAY EXAM CHEST 2 VIEWS: CPT | Mod: 26

## 2019-07-25 RX ORDER — PREDNISOLONE ACETATE 10 MG/ML
1 SUSPENSION/ DROPS OPHTHALMIC 4 TIMES DAILY
Refills: 0 | Status: ACTIVE | COMMUNITY
Start: 2019-07-25

## 2019-07-25 NOTE — HEALTH RISK ASSESSMENT
[No falls in past year] : Patient reported no falls in the past year [0] : 2) Feeling down, depressed, or hopeless: Not at all (0) [No] : No [] : No

## 2019-07-25 NOTE — HISTORY OF PRESENT ILLNESS
[FreeTextEntry1] : Pt presents today for follow up visit. Pt with complaints of twitching.to left side of face and numbness/tingling in the 2 fingers to left hand.  [de-identified] : She is doing well.  She feels something is wrong however in 2011 she had twitching to the face.  Now she is having a warmth on the left side of the face since Saturday.  The sensation is intermittent with numbness and tingling in the 2 finger to left hand.  Pt without vision change, headaches, no numbness or tingling to area on face.  She feels her balance is off, without any falls.  She refuses to use a cane.\par \par Her breathing is good. she is rarely requiring the SAB and using the Advair.  Denies fever, change in appetite, change in BM, chest pain or palpations. \par

## 2019-07-25 NOTE — ASSESSMENT
[FreeTextEntry1] : TIA\par \par Pt sent to neurology to be seen today due to numbness and tingling in hand and facial warmth.  She is to be evaluated for TIA and has possible scan. Pt is currently on Pradaxa \par \par Cough\par \par Improved \par \par Coronary artery disease.\par \par  Patient is clinically stable and no evidence of angina nor CHF. She is to continue on the current regimen. She is followed by the cardiologist and will follow on ECHO\par \par Atrial fibrillation\par \par Patient was in sinus and rate is controlled with beta blockers. The patient is on pradaxa without any signs of bleeding.  rate is controlled and on anticoagulation\par \par Hypertension\par \par The blood pressure was controlled and she is to continue on amlodipine and metoprolol. She is to continue on Lasix every other day.  Had a long discussion about amlodipine and it being a CCB not an antibiotic.\par \par asthma\par \par Patient is continue on albuterol as-needed basis in addition to Advair.  she rarely using her JUDITH\par \par Sarcoidosis\par \par Patient is stable no evidence of acute episode.  Follow with repeat PFT \par \par \par PFT due 3/2020 (ordered)\par colonoscopy due 2019 she is going to call Dr. Gutierrez. (ordered)   Put a call to him\par Last CT scan chest 3/2018\par Follow on labs \par seen by derm\par pt due to mammogram  due

## 2019-07-28 LAB
25(OH)D3 SERPL-MCNC: 32 NG/ML
ALBUMIN SERPL ELPH-MCNC: 4.3 G/DL
ALP BLD-CCNC: 97 U/L
ALT SERPL-CCNC: 12 U/L
ANION GAP SERPL CALC-SCNC: 14 MMOL/L
APPEARANCE: CLEAR
AST SERPL-CCNC: 20 U/L
BASOPHILS # BLD AUTO: 0.08 K/UL
BASOPHILS NFR BLD AUTO: 0.8 %
BILIRUB SERPL-MCNC: 0.4 MG/DL
BILIRUBIN URINE: NEGATIVE
BLOOD URINE: NEGATIVE
BUN SERPL-MCNC: 12 MG/DL
CALCIUM SERPL-MCNC: 9.2 MG/DL
CHLORIDE SERPL-SCNC: 106 MMOL/L
CHOLEST SERPL-MCNC: 141 MG/DL
CHOLEST/HDLC SERPL: 3.4 RATIO
CO2 SERPL-SCNC: 22 MMOL/L
COLOR: NORMAL
CREAT SERPL-MCNC: 0.94 MG/DL
EOSINOPHIL # BLD AUTO: 0.4 K/UL
EOSINOPHIL NFR BLD AUTO: 4.2 %
ESTIMATED AVERAGE GLUCOSE: 126 MG/DL
GLUCOSE QUALITATIVE U: NEGATIVE
GLUCOSE SERPL-MCNC: 89 MG/DL
HBA1C MFR BLD HPLC: 6 %
HCT VFR BLD CALC: 45.4 %
HDLC SERPL-MCNC: 42 MG/DL
HGB BLD-MCNC: 14.1 G/DL
IMM GRANULOCYTES NFR BLD AUTO: 0.3 %
KETONES URINE: NEGATIVE
LDLC SERPL CALC-MCNC: 66 MG/DL
LEUKOCYTE ESTERASE URINE: NEGATIVE
LYMPHOCYTES # BLD AUTO: 2.96 K/UL
LYMPHOCYTES NFR BLD AUTO: 30.8 %
MAN DIFF?: NORMAL
MCHC RBC-ENTMCNC: 29.3 PG
MCHC RBC-ENTMCNC: 31.1 GM/DL
MCV RBC AUTO: 94.4 FL
MONOCYTES # BLD AUTO: 1.01 K/UL
MONOCYTES NFR BLD AUTO: 10.5 %
NEUTROPHILS # BLD AUTO: 5.13 K/UL
NEUTROPHILS NFR BLD AUTO: 53.4 %
NITRITE URINE: NEGATIVE
PH URINE: 5
PLATELET # BLD AUTO: 286 K/UL
POTASSIUM SERPL-SCNC: 4.5 MMOL/L
PROT SERPL-MCNC: 7.2 G/DL
PROTEIN URINE: NEGATIVE
RBC # BLD: 4.81 M/UL
RBC # FLD: 12.8 %
SODIUM SERPL-SCNC: 142 MMOL/L
SPECIFIC GRAVITY URINE: 1.01
T3 SERPL-MCNC: 137 NG/DL
T4 SERPL-MCNC: 13.5 UG/DL
TRIGL SERPL-MCNC: 166 MG/DL
TSH SERPL-ACNC: 0.01 UIU/ML
UROBILINOGEN URINE: NORMAL
WBC # FLD AUTO: 9.61 K/UL

## 2019-08-06 ENCOUNTER — OUTPATIENT (OUTPATIENT)
Dept: OUTPATIENT SERVICES | Facility: HOSPITAL | Age: 84
LOS: 1 days | End: 2019-08-06
Payer: MEDICARE

## 2019-08-06 ENCOUNTER — APPOINTMENT (OUTPATIENT)
Dept: CT IMAGING | Facility: HOSPITAL | Age: 84
End: 2019-08-06

## 2019-08-06 PROCEDURE — 74177 CT ABD & PELVIS W/CONTRAST: CPT

## 2019-08-06 PROCEDURE — 74177 CT ABD & PELVIS W/CONTRAST: CPT | Mod: 26

## 2019-08-13 ENCOUNTER — APPOINTMENT (OUTPATIENT)
Dept: HEART AND VASCULAR | Facility: CLINIC | Age: 84
End: 2019-08-13
Payer: MEDICARE

## 2019-08-13 VITALS
DIASTOLIC BLOOD PRESSURE: 66 MMHG | HEIGHT: 60 IN | WEIGHT: 159 LBS | HEART RATE: 81 BPM | SYSTOLIC BLOOD PRESSURE: 151 MMHG | BODY MASS INDEX: 31.22 KG/M2

## 2019-08-13 PROCEDURE — 99213 OFFICE O/P EST LOW 20 MIN: CPT | Mod: 25

## 2019-08-13 PROCEDURE — 93280 PM DEVICE PROGR EVAL DUAL: CPT

## 2019-08-13 NOTE — REASON FOR VISIT
[Arrhythmias (seen on stored data)] : arrhythmias seen on stored data [Follow-up Device Check] : follow-up device check visit

## 2019-08-15 ENCOUNTER — APPOINTMENT (OUTPATIENT)
Dept: PULMONOLOGY | Facility: CLINIC | Age: 84
End: 2019-08-15
Payer: MEDICARE

## 2019-08-15 VITALS
RESPIRATION RATE: 12 BRPM | WEIGHT: 158 LBS | HEIGHT: 60 IN | OXYGEN SATURATION: 98 % | BODY MASS INDEX: 31.02 KG/M2 | DIASTOLIC BLOOD PRESSURE: 70 MMHG | SYSTOLIC BLOOD PRESSURE: 120 MMHG | HEART RATE: 87 BPM | TEMPERATURE: 98.3 F

## 2019-08-15 PROCEDURE — 36415 COLL VENOUS BLD VENIPUNCTURE: CPT

## 2019-08-15 PROCEDURE — 99214 OFFICE O/P EST MOD 30 MIN: CPT | Mod: 25

## 2019-08-16 LAB
APTT BLD: 94.8 SEC
INR PPP: 1.53 RATIO
PT BLD: 17.5 SEC
TSH SERPL-ACNC: 0.01 UIU/ML

## 2019-08-16 NOTE — ASSESSMENT
[Patient Optimized for Surgery] : Patient optimized for surgery [No Further Testing Recommended] : no further testing recommended [ECG] : ECG [Modify anticoagulant treatment prior to procedure] : Modify anticoagulant treatment prior to procedure

## 2019-08-16 NOTE — PLAN
[FreeTextEntry1] : Dr. Gutierrez 8/21/2019 colonoscopy Fax too [phone]\par \par preop\par \par No contraindication for procedure. The medical condition is optimized for surgery.  Avoid oversedation.  The patient was given instructions regarding the preoperative preparation. I instructed the patient to notify me if there is any change in the clinical status prior the surgery including any skin manifestations. No aspirin or NSAIDs, Naproxen, CHAVEZ inhibitors, herbs, green tea and vitamins prior to surgery. NPO after midnight prior to surg. \par \par Pt is awaiting to hear from cardiology regarding holding Pradaxa prior to colonoscopy.  Recommend holding 48 hours prior and restart 24 hours post procedure. \par \par TIA\par \par Stable. Pt is currently on Pradaxa \par \par Coronary artery disease.\par \par  Patient is clinically stable and no evidence of angina nor CHF. She is to continue on the current regimen. She is followed by the cardiologist.\par \par Atrial fibrillation\par \par Patient was in sinus and rate is controlled with beta blockers. The patient is on pradaxa without any signs of bleeding. \par \par Hypertension\par \par The blood pressure was controlled and she is to continue on amlodipine and metoprolol. She is to continue on Lasix every other day. \par \par asthma\par \par Patient is continue on albuterol as-needed basis in addition to Advair. she rarely using her JUDITH\par \par Sarcoidosis\par \par Patient is stable no evidence of acute episode. Follow with repeat PFT.  PFT due 3/2020 (ordered)\par \par Post nasal gtt\par \par Start flonase cough related to her post nasal gtt. \par \par

## 2019-08-16 NOTE — HISTORY OF PRESENT ILLNESS
[Atrial Fibrillation] : atrial fibrillation [No Pertinent Pulmonary History] : no history of asthma, COPD, sleep apnea, or smoking [No Adverse Anesthesia Reaction] : no adverse anesthesia reaction in self or family member [Chronic Anticoagulation] : chronic anticoagulation [Chronic Kidney Disease] : no chronic kidney disease [Diabetes] : no diabetes [FreeTextEntry1] : C [FreeTextEntry2] : Colonoscopy [FreeTextEntry3] : Dr. Gutierrez [FreeTextEntry4] : 83 year old female with PMHx of sarcoidosis, HTN, HLD, afib on Pradaxa, hypothyroidism, CAD s/p 2 stents and pacemaker & TIA in 2014 presents today for preop clearance for colonoscopy.\par \par She was taking the metoprolol once a day but the order is for twice a day.  She will correct her treatment.  She is coughing in the morning and clearing her throat, has complaints of post nasal gtt.  She denies any chest pain, palpitation, dizziness, wheezing, fever, SOB, blood in the stool, blood in urine. She has some abdominal discomfort relieved by having a bowel movement. \par

## 2019-08-16 NOTE — PHYSICAL EXAM
[No Acute Distress] : no acute distress [Well Nourished] : well nourished [Well Developed] : well developed [Normal Sclera/Conjunctiva] : normal sclera/conjunctiva [Well-Appearing] : well-appearing [PERRL] : pupils equal round and reactive to light [Normal Outer Ear/Nose] : the outer ears and nose were normal in appearance [EOMI] : extraocular movements intact [Normal Oropharynx] : the oropharynx was normal [No JVD] : no jugular venous distention [No Lymphadenopathy] : no lymphadenopathy [Supple] : supple [Thyroid Normal, No Nodules] : the thyroid was normal and there were no nodules present [No Respiratory Distress] : no respiratory distress  [No Accessory Muscle Use] : no accessory muscle use [Clear to Auscultation] : lungs were clear to auscultation bilaterally [Normal Rate] : normal rate  [Regular Rhythm] : with a regular rhythm [Normal S1, S2] : normal S1 and S2 [No Murmur] : no murmur heard [No Carotid Bruits] : no carotid bruits [No Abdominal Bruit] : a ~M bruit was not heard ~T in the abdomen [No Varicosities] : no varicosities [Pedal Pulses Present] : the pedal pulses are present [No Edema] : there was no peripheral edema [No Palpable Aorta] : no palpable aorta [No Extremity Clubbing/Cyanosis] : no extremity clubbing/cyanosis [Soft] : abdomen soft [Non Tender] : non-tender [Non-distended] : non-distended [No Masses] : no abdominal mass palpated [No HSM] : no HSM [Normal Bowel Sounds] : normal bowel sounds [Normal Posterior Cervical Nodes] : no posterior cervical lymphadenopathy [Normal Anterior Cervical Nodes] : no anterior cervical lymphadenopathy [No CVA Tenderness] : no CVA  tenderness [No Spinal Tenderness] : no spinal tenderness [No Joint Swelling] : no joint swelling [Grossly Normal Strength/Tone] : grossly normal strength/tone [No Rash] : no rash [Coordination Grossly Intact] : coordination grossly intact [No Focal Deficits] : no focal deficits [Normal Gait] : normal gait [Deep Tendon Reflexes (DTR)] : deep tendon reflexes were 2+ and symmetric [Normal Affect] : the affect was normal [Normal Insight/Judgement] : insight and judgment were intact

## 2019-08-16 NOTE — RESULTS/DATA
[] : results reviewed [ECG Reviewed] : reviewed [Normal] : The 12 - lead ECG is normal [NSR] : normal sinus rhythm [de-identified] : Elevated on Pradaxa  [de-identified] : EK May 2, 2019 [de-identified] : CXR 7/25/19 clear

## 2019-08-18 ENCOUNTER — EMERGENCY (EMERGENCY)
Facility: HOSPITAL | Age: 84
LOS: 1 days | Discharge: HOME | End: 2019-08-18
Attending: EMERGENCY MEDICINE | Admitting: EMERGENCY MEDICINE
Payer: MEDICARE

## 2019-08-18 VITALS
SYSTOLIC BLOOD PRESSURE: 185 MMHG | HEART RATE: 71 BPM | OXYGEN SATURATION: 100 % | RESPIRATION RATE: 16 BRPM | DIASTOLIC BLOOD PRESSURE: 74 MMHG | WEIGHT: 159.39 LBS | HEIGHT: 60 IN | TEMPERATURE: 99 F

## 2019-08-18 VITALS
RESPIRATION RATE: 17 BRPM | HEART RATE: 72 BPM | SYSTOLIC BLOOD PRESSURE: 146 MMHG | TEMPERATURE: 98 F | DIASTOLIC BLOOD PRESSURE: 70 MMHG | OXYGEN SATURATION: 99 %

## 2019-08-18 DIAGNOSIS — M25.532 PAIN IN LEFT WRIST: ICD-10-CM

## 2019-08-18 DIAGNOSIS — S52.502A UNSPECIFIED FRACTURE OF THE LOWER END OF LEFT RADIUS, INITIAL ENCOUNTER FOR CLOSED FRACTURE: ICD-10-CM

## 2019-08-18 DIAGNOSIS — S09.90XA UNSPECIFIED INJURY OF HEAD, INITIAL ENCOUNTER: ICD-10-CM
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
## 2019-08-18 DIAGNOSIS — I10 ESSENTIAL (PRIMARY) HYPERTENSION: ICD-10-CM

## 2019-08-18 DIAGNOSIS — Z79.899 OTHER LONG TERM (CURRENT) DRUG THERAPY: ICD-10-CM
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
## 2019-08-18 PROCEDURE — 73110 X-RAY EXAM OF WRIST: CPT | Mod: 26,LT

## 2019-08-18 PROCEDURE — 73090 X-RAY EXAM OF FOREARM: CPT | Mod: 26,LT

## 2019-08-18 PROCEDURE — 73110 X-RAY EXAM OF WRIST: CPT

## 2019-08-18 PROCEDURE — 73090 X-RAY EXAM OF FOREARM: CPT

## 2019-08-18 PROCEDURE — 25605 CLTX DST RDL FX/EPHYS SEP W/: CPT | Mod: LT

## 2019-08-18 PROCEDURE — 99285 EMERGENCY DEPT VISIT HI MDM: CPT | Mod: 25

## 2019-08-18 PROCEDURE — 70450 CT HEAD/BRAIN W/O DYE: CPT | Mod: 26

## 2019-08-18 PROCEDURE — 72125 CT NECK SPINE W/O DYE: CPT

## 2019-08-18 PROCEDURE — 73110 X-RAY EXAM OF WRIST: CPT | Mod: 26,LT,77

## 2019-08-18 PROCEDURE — 72125 CT NECK SPINE W/O DYE: CPT | Mod: 26

## 2019-08-18 PROCEDURE — 99284 EMERGENCY DEPT VISIT MOD MDM: CPT | Mod: 25

## 2019-08-18 PROCEDURE — 70450 CT HEAD/BRAIN W/O DYE: CPT

## 2019-08-18 RX ORDER — OXYCODONE AND ACETAMINOPHEN 5; 325 MG/1; MG/1
1 TABLET ORAL ONCE
Refills: 0 | Status: DISCONTINUED | OUTPATIENT
Start: 2019-08-18 | End: 2019-08-18

## 2019-08-18 RX ADMIN — Medication 30 MILLILITER(S): at 10:22

## 2019-08-18 RX ADMIN — OXYCODONE AND ACETAMINOPHEN 1 TABLET(S): 5; 325 TABLET ORAL at 10:22

## 2019-08-18 NOTE — CONSULT NOTE ADULT - SUBJECTIVE AND OBJECTIVE BOX
Orthopaedic Surgery Consult Note    For Surgeon: Dolly    HPI:  83F RHD s/p FOOSH yesterday to L side with left distal radius fracture. Reports head trauma but no LOC. Head CT negative for intracranial bleed. No other injuries. L wrist swelling anf deformity. Mild tenderness to palpation. Neurovasc intact. Denies numbness or tingling.       Allergies    codeine (Unknown)  latex (Other)    Intolerances      PAST MEDICAL & SURGICAL HISTORY:  Transient cerebral ischemia: TIA (transient ischemic attack)  Atherosclerosis of coronary artery: CAD (coronary artery disease)  Sarcoidosis: Sarcoidosis  Essential hypertension: HTN (hypertension)  Diverticulosis of large intestine: Diverticulosis  Presence of cardiac pacemaker: s/p 2011  Cataract: Cataract  Status post percutaneous transluminal coronary angioplasty: s/p 2012    MEDICATIONS  (STANDING):    MEDICATIONS  (PRN):      Vital Signs Last 24 Hrs  T(C): 36.4 (18 Aug 2019 13:35), Max: 37.2 (18 Aug 2019 09:19)  T(F): 97.6 (18 Aug 2019 13:35), Max: 98.9 (18 Aug 2019 09:19)  HR: 72 (18 Aug 2019 13:35) (71 - 72)  BP: 146/70 (18 Aug 2019 13:35) (146/70 - 185/74)  BP(mean): --  RR: 17 (18 Aug 2019 13:35) (16 - 17)  SpO2: 99% (18 Aug 2019 13:35) (99% - 100%)    Physical Exam:  Gen: Awake and alert  NAD  L wrist swelling and deformity  Able to range shoulder and elbow  Sensation intact C5-T1  2+ radial pulse  fingers warm and well perfused  BCR    Imaging: Xray shows L DRF    A/P: 83F RHD s/p FOOSH with L DRF    - Reduced using hematoma block  - Tolerated procedure well and remained neurovasc intact post reduction   - Sugar tong splint  - Sling and elevate   - NWB LUE  - pain control  - May need surgery  - Follow up Dr. Alberto this week    -Discussed with Dr. Alberto

## 2019-08-18 NOTE — ED ADULT NURSE NOTE - NSIMPLEMENTINTERV_GEN_ALL_ED
Implemented All Fall with Harm Risk Interventions:  [place] to call system. Call bell, personal items and telephone within reach. Instruct patient to call for assistance. Room bathroom lighting operational. Non-slip footwear when patient is off stretcher. Physically safe environment: no spills, clutter or unnecessary equipment. Stretcher in lowest position, wheels locked, appropriate side rails in place. Provide visual cue, wrist band, yellow gown, etc. Monitor gait and stability. Monitor for mental status changes and reorient to person, place, and time. Review medications for side effects contributing to fall risk. Reinforce activity limits and safety measures with patient and family. Provide visual clues: red socks.

## 2019-08-18 NOTE — ED ADULT NURSE NOTE - OBJECTIVE STATEMENT
Pt presents to ED s/p trip and fall last night, denies LOC. With bruising noted to L side of face, pt states she is on pradaxa and had bleeding from the nose after she hit her face which resolved, denies HA/dizziness/nv. Also c/o L wrist pain +swelling/deformity noted, denies numbness/tingling in digits, able to move digits.

## 2019-08-18 NOTE — ED PROVIDER NOTE - CLINICAL SUMMARY MEDICAL DECISION MAKING FREE TEXT BOX
pt s/p mechanical fall yest, fx'd L wrist and contusion to face (ct head / c spine neg for acute bleed / fx), neurovascular intact, pain controlled w/percocet, ortho consulted for reduction and immobilization given complex fx, pt tolerated procedure well, pmd updated of plan, pt understands and agrees w/dc plan

## 2019-08-18 NOTE — ED ADULT NURSE NOTE - CADM POA URETHRAL CATHETER
Principal Discharge DX:	Altered mental status  Secondary Diagnosis:	Hyponatremia  Secondary Diagnosis:	Fever
No

## 2019-08-18 NOTE — ED PROVIDER NOTE - CARE PLAN
Principal Discharge DX:	Wrist fracture, closed  Secondary Diagnosis:	CHI (closed head injury)  Secondary Diagnosis:	Fall

## 2019-08-18 NOTE — ED PROVIDER NOTE - NSFOLLOWUPINSTRUCTIONS_ED_ALL_ED_FT
wrist fracture  rest, ice, elevate, splint must stay on until follow up with carlton marin for elevation, pain medication as needed  keep splint dry, return immediately for blue discoloration to fingers, swelling, severe pain     Closed Head Injury  A closed head injury is an injury to your head that may or may not involve a traumatic brain injury (TBI). Symptoms of TBI can be short or long lasting and include headache, dizziness, interference with memory or speech, fatigue, confusion, changes in sleep, mood changes, nausea, depression/anxiety, and dulling of senses. Make sure to obtain proper rest which includes getting plenty of sleep, avoiding excessive visual stimulation, and avoiding activities that may cause physical or mental stress. Avoid any situation where there is potential for another head injury, including sports.  SEEK IMMEDIATE MEDICAL CARE IF YOU HAVE ANY OF THE FOLLOWING SYMPTOMS: unusual drowsiness, vomiting, severe dizziness, seizures, lightheadedness, muscular weakness, different pupil sizes, visual changes, or clear or bloody discharge from your ears or nose.

## 2019-08-18 NOTE — ED ADULT TRIAGE NOTE - CHIEF COMPLAINT QUOTE
Pt CO pain to head and Left wrist s/p mechanical fall last night.  Pt states "I turned around while doing laundry and I think I spun too fast and lost my footing."  Pt noted with bruising and abrassion to Left side of face and states "I did hit my head and I'm on Pradaxa."  Pt denies any Hx of TDAP.  PT denies Loc, dizziness, N/V/d, SOB, fevers and CP.

## 2019-08-18 NOTE — ED PROVIDER NOTE - DIAGNOSTIC INTERPRETATION
wrist: + impacted, comminuted, intra articular distal radius fx, + sts  forearm: + distal radius fx, + sts

## 2019-08-18 NOTE — ED PROVIDER NOTE - OBJECTIVE STATEMENT
The pt is a 84 y/o F, who presents to ED c/o L wrist pain s/p trip and fall yest. Pt states that her shoe got caught on the carpet, + FOOSH, pain to L wrist is 7/10, constant and throbbing, decreased ROM, + swelling, has not taken any pain meds. Admits to hitting L side of face when fell, having some soreness to neck this am. Denies loc, visual changes, h/a, dizziness, numbness or tingling to fingers, elbow or shoulder pain, any other injuries The pt is a 84 y/o F, who presents to ED c/o L wrist pain s/p trip and fall yest. Pt states that her shoe got caught on the carpet, + FOOSH, pain to L wrist is 7/10, constant and throbbing, decreased ROM, + swelling, has not taken any pain meds. Admits to hitting L side of face when fell, on pradaxa (cad), having some soreness to neck this am. Denies loc, visual changes, h/a, dizziness, numbness or tingling to fingers, elbow or shoulder pain, any other injuries

## 2019-08-18 NOTE — ED ADULT NURSE NOTE - PMH
Atherosclerosis of coronary artery  CAD (coronary artery disease)  Diverticulosis of large intestine  Diverticulosis  Essential hypertension  HTN (hypertension)  Sarcoidosis  Sarcoidosis  Transient cerebral ischemia  TIA (transient ischemic attack)

## 2019-08-18 NOTE — ED PROVIDER NOTE - ENMT, MLM
Airway patent, Nasal mucosa clear. Mouth with normal mucosa. Throat has no vesicles, no oropharyngeal exudates and uvula is midline. Ears: no hemotympanum b/l, no kidd signs b/l, no raccoon eyes b/l. no step offs, + 1mm abrasion to l lateral orbit, no orbital tend b/l Airway patent, Nasal mucosa clear. Mouth with normal mucosa. Throat has no vesicles, no oropharyngeal exudates and uvula is midline. Ears: no hemotympanum b/l, no kidd signs b/l, no raccoon eyes b/l. no step offs, + 1mm abrasion to l lateral orbit, + ecchymosis to L lower orbit, no orbital tend b/l

## 2019-08-18 NOTE — ED ADULT NURSE NOTE - PSH
Cataract  Cataract  Presence of cardiac pacemaker  s/p 2011  Status post percutaneous transluminal coronary angioplasty  s/p 2012

## 2019-08-18 NOTE — ED PROVIDER NOTE - MUSCULOSKELETAL, MLM
Spine appears normal, range of motion is not limited, no muscle or joint tenderness; L UE: no shoulder tend, no elbow tend, + mod swelling to dorsal aspect of wrist and mid forearm, + tend to palp over mid forearm, + tend over distal radius and ulnar, radial pulse 2+, no tend over metacarpals, able to wiggle fingers, unable to range wrist 2/2 to pain

## 2019-08-21 ENCOUNTER — OUTPATIENT (OUTPATIENT)
Dept: OUTPATIENT SERVICES | Facility: HOSPITAL | Age: 84
LOS: 1 days | End: 2019-08-21
Payer: MEDICARE

## 2019-08-21 ENCOUNTER — APPOINTMENT (OUTPATIENT)
Dept: CT IMAGING | Facility: HOSPITAL | Age: 84
End: 2019-08-21
Payer: MEDICARE

## 2019-08-21 PROCEDURE — 73200 CT UPPER EXTREMITY W/O DYE: CPT | Mod: 26,LT

## 2019-08-21 PROCEDURE — 73200 CT UPPER EXTREMITY W/O DYE: CPT

## 2019-08-22 ENCOUNTER — INPATIENT (INPATIENT)
Facility: HOSPITAL | Age: 84
LOS: 1 days | Discharge: HOME | DRG: 512 | End: 2019-08-24
Attending: SPECIALIST | Admitting: SPECIALIST
Payer: MEDICARE

## 2019-08-22 VITALS
HEART RATE: 81 BPM | TEMPERATURE: 99 F | SYSTOLIC BLOOD PRESSURE: 136 MMHG | OXYGEN SATURATION: 95 % | RESPIRATION RATE: 18 BRPM | DIASTOLIC BLOOD PRESSURE: 71 MMHG
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
## 2019-08-22 LAB
ALBUMIN SERPL ELPH-MCNC: 3.9 G/DL — SIGNIFICANT CHANGE UP (ref 3.3–5)
ALP SERPL-CCNC: 90 U/L — SIGNIFICANT CHANGE UP (ref 40–120)
ALT FLD-CCNC: 11 U/L — SIGNIFICANT CHANGE UP (ref 10–45)
ANION GAP SERPL CALC-SCNC: 13 MMOL/L — SIGNIFICANT CHANGE UP (ref 5–17)
APPEARANCE UR: ABNORMAL
APTT BLD: 35.3 SEC — SIGNIFICANT CHANGE UP (ref 27.5–36.3)
AST SERPL-CCNC: 16 U/L — SIGNIFICANT CHANGE UP (ref 10–40)
BASOPHILS # BLD AUTO: 0.07 K/UL — SIGNIFICANT CHANGE UP (ref 0–0.2)
BASOPHILS NFR BLD AUTO: 0.7 % — SIGNIFICANT CHANGE UP (ref 0–2)
BILIRUB SERPL-MCNC: 0.5 MG/DL — SIGNIFICANT CHANGE UP (ref 0.2–1.2)
BILIRUB UR-MCNC: ABNORMAL
BUN SERPL-MCNC: 14 MG/DL — SIGNIFICANT CHANGE UP (ref 7–23)
CALCIUM SERPL-MCNC: 9.4 MG/DL — SIGNIFICANT CHANGE UP (ref 8.4–10.5)
CHLORIDE SERPL-SCNC: 106 MMOL/L — SIGNIFICANT CHANGE UP (ref 96–108)
CO2 SERPL-SCNC: 22 MMOL/L — SIGNIFICANT CHANGE UP (ref 22–31)
COLOR SPEC: YELLOW — SIGNIFICANT CHANGE UP
CREAT SERPL-MCNC: 1.12 MG/DL — SIGNIFICANT CHANGE UP (ref 0.5–1.3)
DIFF PNL FLD: NEGATIVE — SIGNIFICANT CHANGE UP
EOSINOPHIL # BLD AUTO: 0.34 K/UL — SIGNIFICANT CHANGE UP (ref 0–0.5)
EOSINOPHIL NFR BLD AUTO: 3.3 % — SIGNIFICANT CHANGE UP (ref 0–6)
GLUCOSE SERPL-MCNC: 103 MG/DL — HIGH (ref 70–99)
GLUCOSE UR QL: NEGATIVE — SIGNIFICANT CHANGE UP
HCT VFR BLD CALC: 42.2 % — SIGNIFICANT CHANGE UP (ref 34.5–45)
HGB BLD-MCNC: 13.7 G/DL — SIGNIFICANT CHANGE UP (ref 11.5–15.5)
IMM GRANULOCYTES NFR BLD AUTO: 0.3 % — SIGNIFICANT CHANGE UP (ref 0–1.5)
INR BLD: 1.03 — SIGNIFICANT CHANGE UP (ref 0.88–1.16)
KETONES UR-MCNC: ABNORMAL MG/DL
LEUKOCYTE ESTERASE UR-ACNC: ABNORMAL
LYMPHOCYTES # BLD AUTO: 3.17 K/UL — SIGNIFICANT CHANGE UP (ref 1–3.3)
LYMPHOCYTES # BLD AUTO: 30.8 % — SIGNIFICANT CHANGE UP (ref 13–44)
MCHC RBC-ENTMCNC: 30 PG — SIGNIFICANT CHANGE UP (ref 27–34)
MCHC RBC-ENTMCNC: 32.5 GM/DL — SIGNIFICANT CHANGE UP (ref 32–36)
MCV RBC AUTO: 92.5 FL — SIGNIFICANT CHANGE UP (ref 80–100)
MONOCYTES # BLD AUTO: 0.82 K/UL — SIGNIFICANT CHANGE UP (ref 0–0.9)
MONOCYTES NFR BLD AUTO: 8 % — SIGNIFICANT CHANGE UP (ref 2–14)
NEUTROPHILS # BLD AUTO: 5.85 K/UL — SIGNIFICANT CHANGE UP (ref 1.8–7.4)
NEUTROPHILS NFR BLD AUTO: 56.9 % — SIGNIFICANT CHANGE UP (ref 43–77)
NITRITE UR-MCNC: NEGATIVE — SIGNIFICANT CHANGE UP
NRBC # BLD: 0 /100 WBCS — SIGNIFICANT CHANGE UP (ref 0–0)
PH UR: 5 — SIGNIFICANT CHANGE UP (ref 5–8)
PLATELET # BLD AUTO: 292 K/UL — SIGNIFICANT CHANGE UP (ref 150–400)
POTASSIUM SERPL-MCNC: 4.2 MMOL/L — SIGNIFICANT CHANGE UP (ref 3.5–5.3)
POTASSIUM SERPL-SCNC: 4.2 MMOL/L — SIGNIFICANT CHANGE UP (ref 3.5–5.3)
PROT SERPL-MCNC: 7.4 G/DL — SIGNIFICANT CHANGE UP (ref 6–8.3)
PROT UR-MCNC: NEGATIVE MG/DL — SIGNIFICANT CHANGE UP
PROTHROM AB SERPL-ACNC: 11.6 SEC — SIGNIFICANT CHANGE UP (ref 10–12.9)
RBC # BLD: 4.56 M/UL — SIGNIFICANT CHANGE UP (ref 3.8–5.2)
RBC # FLD: 12.7 % — SIGNIFICANT CHANGE UP (ref 10.3–14.5)
SODIUM SERPL-SCNC: 141 MMOL/L — SIGNIFICANT CHANGE UP (ref 135–145)
SP GR SPEC: 1.02 — SIGNIFICANT CHANGE UP (ref 1–1.03)
UROBILINOGEN FLD QL: 0.2 E.U./DL — SIGNIFICANT CHANGE UP
WBC # BLD: 10.28 K/UL — SIGNIFICANT CHANGE UP (ref 3.8–10.5)
WBC # FLD AUTO: 10.28 K/UL — SIGNIFICANT CHANGE UP (ref 3.8–10.5)

## 2019-08-22 PROCEDURE — 99285 EMERGENCY DEPT VISIT HI MDM: CPT

## 2019-08-22 PROCEDURE — 93010 ELECTROCARDIOGRAM REPORT: CPT

## 2019-08-22 PROCEDURE — 71046 X-RAY EXAM CHEST 2 VIEWS: CPT | Mod: 26

## 2019-08-22 RX ORDER — ACETAMINOPHEN 500 MG
650 TABLET ORAL EVERY 4 HOURS
Refills: 0 | Status: DISCONTINUED | OUTPATIENT
Start: 2019-08-22 | End: 2019-08-23

## 2019-08-22 RX ORDER — OXYCODONE HYDROCHLORIDE 5 MG/1
10 TABLET ORAL EVERY 4 HOURS
Refills: 0 | Status: DISCONTINUED | OUTPATIENT
Start: 2019-08-22 | End: 2019-08-23

## 2019-08-22 RX ORDER — MAGNESIUM HYDROXIDE 400 MG/1
30 TABLET, CHEWABLE ORAL DAILY
Refills: 0 | Status: DISCONTINUED | OUTPATIENT
Start: 2019-08-22 | End: 2019-08-23

## 2019-08-22 RX ORDER — DOCUSATE SODIUM 100 MG
100 CAPSULE ORAL THREE TIMES A DAY
Refills: 0 | Status: DISCONTINUED | OUTPATIENT
Start: 2019-08-22 | End: 2019-08-23

## 2019-08-22 RX ORDER — ONDANSETRON 8 MG/1
4 TABLET, FILM COATED ORAL EVERY 6 HOURS
Refills: 0 | Status: DISCONTINUED | OUTPATIENT
Start: 2019-08-22 | End: 2019-08-23

## 2019-08-22 RX ORDER — ACETAMINOPHEN 500 MG
1000 TABLET ORAL ONCE
Refills: 0 | Status: COMPLETED | OUTPATIENT
Start: 2019-08-22 | End: 2019-08-22

## 2019-08-22 RX ORDER — OXYCODONE HYDROCHLORIDE 5 MG/1
5 TABLET ORAL EVERY 4 HOURS
Refills: 0 | Status: DISCONTINUED | OUTPATIENT
Start: 2019-08-22 | End: 2019-08-23

## 2019-08-22 RX ORDER — POLYETHYLENE GLYCOL 3350 17 G/17G
17 POWDER, FOR SOLUTION ORAL DAILY
Refills: 0 | Status: DISCONTINUED | OUTPATIENT
Start: 2019-08-22 | End: 2019-08-23

## 2019-08-22 RX ORDER — HYDROMORPHONE HYDROCHLORIDE 2 MG/ML
0.5 INJECTION INTRAMUSCULAR; INTRAVENOUS; SUBCUTANEOUS EVERY 4 HOURS
Refills: 0 | Status: DISCONTINUED | OUTPATIENT
Start: 2019-08-22 | End: 2019-08-23

## 2019-08-22 RX ORDER — SODIUM CHLORIDE 9 MG/ML
1000 INJECTION, SOLUTION INTRAVENOUS
Refills: 0 | Status: DISCONTINUED | OUTPATIENT
Start: 2019-08-22 | End: 2019-08-23

## 2019-08-22 RX ORDER — SENNA PLUS 8.6 MG/1
2 TABLET ORAL AT BEDTIME
Refills: 0 | Status: DISCONTINUED | OUTPATIENT
Start: 2019-08-22 | End: 2019-08-23

## 2019-08-22 RX ADMIN — Medication 500 MILLIGRAM(S): at 16:31

## 2019-08-22 RX ADMIN — Medication 500 MILLIGRAM(S): at 17:14

## 2019-08-22 RX ADMIN — Medication 1000 MILLIGRAM(S): at 16:31

## 2019-08-22 RX ADMIN — Medication 1000 MILLIGRAM(S): at 17:14

## 2019-08-22 NOTE — ED PROVIDER NOTE - NEUROLOGICAL, MLM
LIMITED EXAM DUE TO SPLINT IN PLACE. PT WITH + GOOD SENSATION TO DISTAL FINGER TIPS WITH GOOD CAP REFILL

## 2019-08-22 NOTE — ED ADULT TRIAGE NOTE - ARRIVAL INFO ADDITIONAL COMMENTS
Pt walked into Ed with c/o of left arm pain. She states she injured her left wrist on saturday and it was diagnosed as broken, a cast was placed on sunday, and consulted with otho. She states she has pain on the left 4th and fifth finger with radiation to the left forearm. Onset was last night. She denies taking medication for releif. Fingers are slightly swollen comparted to the right, but she is able to move the fingers, and denies numbness tingling or tight cast sensation.

## 2019-08-22 NOTE — ED ADULT NURSE NOTE - OBJECTIVE STATEMENT
Pt is an 83y female presented to ED w/ c/o of L finger radiating down arm pain. Pt last seen on Sunday at [facility] for left wrist injury in which cast was placed. PT returns today w/ c/o of intermittent pain 8/10, admits to no pain upon assessment. Pt is able to move fingers, denies tingling sensation, states (+) for sensation, capillary refill </= 2 seconds.

## 2019-08-22 NOTE — ED PROVIDER NOTE - CLINICAL SUMMARY MEDICAL DECISION MAKING FREE TEXT BOX
82 y/o female with distal radial fx who was placed in a splint here with increasing pain. soft compartment with sensation intact. limited motor/strength exam due to splint. Ortho consulted. Will admit for OR.

## 2019-08-22 NOTE — HISTORY OF PRESENT ILLNESS
[de-identified] : This is an 83 year old female with SSS S/P PPM, HTN, Afib, CAD and TIA/CVA. She was previously on Xarelto.  Records reflect an episode of gum bleeding (requiring packing) while on Xarelto, as well as possible GI ulcer and significant bruising.  Later started on Eliquis and reported significant bruising.  Eliquis was switched to Pradaxa and she continues to feel the bruising has been less on this agent.  Sees neurology and GI (Dr. Gutierrez).  Sees Dr. Fatima for CAD hx stent.\par \par Evaluated by Dr. Rick today with complaint of gait instability - no acute infarct/hemorrhages.  OK to continue Pradaxa.

## 2019-08-22 NOTE — PROCEDURE
[No] : not [NSR] : normal sinus rhythm [Pacemaker] : pacemaker [Voltage: ___ volts] : Voltage was [unfilled] volts [Sensing Amplitude ___mv] : sensing amplitude was [unfilled] mv [Lead Imp:  ___ohms] : lead impedance was [unfilled] ohms [___V @] : [unfilled] V [___ ms] : [unfilled] ms [None] : none [Apace-Vsense ___ %] : Apace-Vsense [unfilled]% [Asense-Vsense ___ %] : Asense-Vsense [unfilled]% [de-identified] : [employer] [de-identified] : Ana [de-identified] : 12/17/12 [de-identified] : AAIR-DDDR [de-identified] : 81-96 [de-identified] : longevity 5 y [de-identified] : Longest AF episode ~ 6 hours 6/24/19 . \par [place] 0.6%

## 2019-08-22 NOTE — PHYSICAL EXAM
[General Appearance - Well Developed] : well developed [Normal Appearance] : normal appearance [Well Groomed] : well groomed [General Appearance - Well Nourished] : well nourished [No Deformities] : no deformities [Left Infraclavicular] : left infraclavicular area [General Appearance - In No Acute Distress] : no acute distress [Clean] : clean [Dry] : dry [Well-Healed] : well-healed [Palpable Crepitus] : no palpable crepitus [Bleeding] : no active bleeding [Foul Odor] : no foul smell [Purulent Drainage] : no purulent drainage [Serosanguineous Drainage] : no serosanquineous drainage [Serous Drainage] : no serous drainage [Erythema] : not erythematous [Warm] : not warm [Tender] : not tender [Indurated] : not indurated [Fluctuant] : not fluctuant

## 2019-08-22 NOTE — ED ADULT NURSE NOTE - NSIMPLEMENTINTERV_GEN_ALL_ED
Implemented All Universal Safety Interventions:  [place] to call system. Call bell, personal items and telephone within reach. Instruct patient to call for assistance. Room bathroom lighting operational. Non-slip footwear when patient is off stretcher. Physically safe environment: no spills, clutter or unnecessary equipment. Stretcher in lowest position, wheels locked, appropriate side rails in place.

## 2019-08-23 DIAGNOSIS — M25.532 PAIN IN LEFT WRIST: ICD-10-CM

## 2019-08-23 LAB
ANION GAP SERPL CALC-SCNC: 12 MMOL/L — SIGNIFICANT CHANGE UP (ref 5–17)
BUN SERPL-MCNC: 14 MG/DL — SIGNIFICANT CHANGE UP (ref 7–23)
CALCIUM SERPL-MCNC: 9.1 MG/DL — SIGNIFICANT CHANGE UP (ref 8.4–10.5)
CHLORIDE SERPL-SCNC: 107 MMOL/L — SIGNIFICANT CHANGE UP (ref 96–108)
CO2 SERPL-SCNC: 23 MMOL/L — SIGNIFICANT CHANGE UP (ref 22–31)
CREAT SERPL-MCNC: 1.13 MG/DL — SIGNIFICANT CHANGE UP (ref 0.5–1.3)
GLUCOSE SERPL-MCNC: 95 MG/DL — SIGNIFICANT CHANGE UP (ref 70–99)
HCT VFR BLD CALC: 38.9 % — SIGNIFICANT CHANGE UP (ref 34.5–45)
HGB BLD-MCNC: 12.6 G/DL — SIGNIFICANT CHANGE UP (ref 11.5–15.5)
MCHC RBC-ENTMCNC: 29.6 PG — SIGNIFICANT CHANGE UP (ref 27–34)
MCHC RBC-ENTMCNC: 32.4 GM/DL — SIGNIFICANT CHANGE UP (ref 32–36)
MCV RBC AUTO: 91.5 FL — SIGNIFICANT CHANGE UP (ref 80–100)
MRSA PCR RESULT.: NEGATIVE — SIGNIFICANT CHANGE UP
NRBC # BLD: 0 /100 WBCS — SIGNIFICANT CHANGE UP (ref 0–0)
PLATELET # BLD AUTO: 270 K/UL — SIGNIFICANT CHANGE UP (ref 150–400)
POTASSIUM SERPL-MCNC: 4.5 MMOL/L — SIGNIFICANT CHANGE UP (ref 3.5–5.3)
POTASSIUM SERPL-SCNC: 4.5 MMOL/L — SIGNIFICANT CHANGE UP (ref 3.5–5.3)
RBC # BLD: 4.25 M/UL — SIGNIFICANT CHANGE UP (ref 3.8–5.2)
RBC # FLD: 12.6 % — SIGNIFICANT CHANGE UP (ref 10.3–14.5)
S AUREUS DNA NOSE QL NAA+PROBE: NEGATIVE — SIGNIFICANT CHANGE UP
SODIUM SERPL-SCNC: 142 MMOL/L — SIGNIFICANT CHANGE UP (ref 135–145)
WBC # BLD: 8.12 K/UL — SIGNIFICANT CHANGE UP (ref 3.8–10.5)
WBC # FLD AUTO: 8.12 K/UL — SIGNIFICANT CHANGE UP (ref 3.8–10.5)

## 2019-08-23 PROCEDURE — 73100 X-RAY EXAM OF WRIST: CPT | Mod: 26,LT

## 2019-08-23 RX ORDER — FLUTICASONE PROPIONATE 50 MCG
1 SPRAY, SUSPENSION NASAL
Refills: 0 | Status: DISCONTINUED | OUTPATIENT
Start: 2019-08-23 | End: 2019-08-24

## 2019-08-23 RX ORDER — HYDROMORPHONE HYDROCHLORIDE 2 MG/ML
0.5 INJECTION INTRAMUSCULAR; INTRAVENOUS; SUBCUTANEOUS EVERY 4 HOURS
Refills: 0 | Status: DISCONTINUED | OUTPATIENT
Start: 2019-08-23 | End: 2019-08-24

## 2019-08-23 RX ORDER — OXYCODONE HYDROCHLORIDE 5 MG/1
5 TABLET ORAL EVERY 4 HOURS
Refills: 0 | Status: DISCONTINUED | OUTPATIENT
Start: 2019-08-23 | End: 2019-08-24

## 2019-08-23 RX ORDER — DABIGATRAN ETEXILATE MESYLATE 150 MG/1
150 CAPSULE ORAL EVERY 12 HOURS
Refills: 0 | Status: DISCONTINUED | OUTPATIENT
Start: 2019-08-23 | End: 2019-08-24

## 2019-08-23 RX ORDER — LEVOTHYROXINE SODIUM 125 MCG
112 TABLET ORAL DAILY
Refills: 0 | Status: DISCONTINUED | OUTPATIENT
Start: 2019-08-23 | End: 2019-08-24

## 2019-08-23 RX ORDER — SODIUM CHLORIDE 9 MG/ML
1000 INJECTION, SOLUTION INTRAVENOUS
Refills: 0 | Status: DISCONTINUED | OUTPATIENT
Start: 2019-08-23 | End: 2019-08-24

## 2019-08-23 RX ORDER — METOPROLOL TARTRATE 50 MG
25 TABLET ORAL
Refills: 0 | Status: DISCONTINUED | OUTPATIENT
Start: 2019-08-23 | End: 2019-08-23

## 2019-08-23 RX ORDER — POLYETHYLENE GLYCOL 3350 17 G/17G
17 POWDER, FOR SOLUTION ORAL DAILY
Refills: 0 | Status: DISCONTINUED | OUTPATIENT
Start: 2019-08-23 | End: 2019-08-24

## 2019-08-23 RX ORDER — METOPROLOL TARTRATE 50 MG
25 TABLET ORAL
Refills: 0 | Status: DISCONTINUED | OUTPATIENT
Start: 2019-08-23 | End: 2019-08-24

## 2019-08-23 RX ORDER — ACETAMINOPHEN 500 MG
650 TABLET ORAL EVERY 4 HOURS
Refills: 0 | Status: DISCONTINUED | OUTPATIENT
Start: 2019-08-23 | End: 2019-08-24

## 2019-08-23 RX ORDER — CEFAZOLIN SODIUM 1 G
2000 VIAL (EA) INJECTION EVERY 8 HOURS
Refills: 0 | Status: DISCONTINUED | OUTPATIENT
Start: 2019-08-23 | End: 2019-08-23

## 2019-08-23 RX ORDER — OXYCODONE HYDROCHLORIDE 5 MG/1
10 TABLET ORAL EVERY 4 HOURS
Refills: 0 | Status: DISCONTINUED | OUTPATIENT
Start: 2019-08-23 | End: 2019-08-24

## 2019-08-23 RX ORDER — ATORVASTATIN CALCIUM 80 MG/1
20 TABLET, FILM COATED ORAL AT BEDTIME
Refills: 0 | Status: DISCONTINUED | OUTPATIENT
Start: 2019-08-23 | End: 2019-08-23

## 2019-08-23 RX ORDER — ALBUTEROL 90 UG/1
2 AEROSOL, METERED ORAL EVERY 6 HOURS
Refills: 0 | Status: DISCONTINUED | OUTPATIENT
Start: 2019-08-23 | End: 2019-08-24

## 2019-08-23 RX ORDER — FUROSEMIDE 40 MG
20 TABLET ORAL DAILY
Refills: 0 | Status: DISCONTINUED | OUTPATIENT
Start: 2019-08-23 | End: 2019-08-24

## 2019-08-23 RX ORDER — LEVOTHYROXINE SODIUM 125 MCG
112 TABLET ORAL DAILY
Refills: 0 | Status: DISCONTINUED | OUTPATIENT
Start: 2019-08-23 | End: 2019-08-23

## 2019-08-23 RX ORDER — ONDANSETRON 8 MG/1
4 TABLET, FILM COATED ORAL EVERY 6 HOURS
Refills: 0 | Status: DISCONTINUED | OUTPATIENT
Start: 2019-08-23 | End: 2019-08-24

## 2019-08-23 RX ORDER — MAGNESIUM HYDROXIDE 400 MG/1
30 TABLET, CHEWABLE ORAL DAILY
Refills: 0 | Status: DISCONTINUED | OUTPATIENT
Start: 2019-08-23 | End: 2019-08-24

## 2019-08-23 RX ORDER — SENNA PLUS 8.6 MG/1
2 TABLET ORAL AT BEDTIME
Refills: 0 | Status: DISCONTINUED | OUTPATIENT
Start: 2019-08-23 | End: 2019-08-24

## 2019-08-23 RX ORDER — AMLODIPINE BESYLATE 2.5 MG/1
5 TABLET ORAL DAILY
Refills: 0 | Status: DISCONTINUED | OUTPATIENT
Start: 2019-08-23 | End: 2019-08-23

## 2019-08-23 RX ORDER — ATORVASTATIN CALCIUM 80 MG/1
20 TABLET, FILM COATED ORAL AT BEDTIME
Refills: 0 | Status: DISCONTINUED | OUTPATIENT
Start: 2019-08-23 | End: 2019-08-24

## 2019-08-23 RX ORDER — DABIGATRAN ETEXILATE MESYLATE 150 MG/1
0 CAPSULE ORAL
Qty: 0 | Refills: 0 | DISCHARGE

## 2019-08-23 RX ORDER — CEFAZOLIN SODIUM 1 G
2000 VIAL (EA) INJECTION EVERY 8 HOURS
Refills: 0 | Status: COMPLETED | OUTPATIENT
Start: 2019-08-23 | End: 2019-08-24

## 2019-08-23 RX ORDER — DOCUSATE SODIUM 100 MG
100 CAPSULE ORAL THREE TIMES A DAY
Refills: 0 | Status: DISCONTINUED | OUTPATIENT
Start: 2019-08-23 | End: 2019-08-24

## 2019-08-23 RX ORDER — HYDROMORPHONE HYDROCHLORIDE 2 MG/ML
0.5 INJECTION INTRAMUSCULAR; INTRAVENOUS; SUBCUTANEOUS
Refills: 0 | Status: DISCONTINUED | OUTPATIENT
Start: 2019-08-23 | End: 2019-08-24

## 2019-08-23 RX ADMIN — POLYETHYLENE GLYCOL 3350 17 GRAM(S): 17 POWDER, FOR SOLUTION ORAL at 21:39

## 2019-08-23 RX ADMIN — Medication 2000 MILLIGRAM(S): at 18:20

## 2019-08-23 RX ADMIN — Medication 100 MILLIGRAM(S): at 21:39

## 2019-08-23 RX ADMIN — DABIGATRAN ETEXILATE MESYLATE 150 MILLIGRAM(S): 150 CAPSULE ORAL at 18:20

## 2019-08-23 RX ADMIN — Medication 1 SPRAY(S): at 18:21

## 2019-08-23 RX ADMIN — OXYCODONE HYDROCHLORIDE 5 MILLIGRAM(S): 5 TABLET ORAL at 19:21

## 2019-08-23 RX ADMIN — Medication 100 MILLIGRAM(S): at 05:04

## 2019-08-23 RX ADMIN — Medication 112 MICROGRAM(S): at 05:04

## 2019-08-23 RX ADMIN — Medication 25 MILLIGRAM(S): at 18:20

## 2019-08-23 RX ADMIN — OXYCODONE HYDROCHLORIDE 5 MILLIGRAM(S): 5 TABLET ORAL at 18:21

## 2019-08-23 RX ADMIN — Medication 25 MILLIGRAM(S): at 05:04

## 2019-08-23 RX ADMIN — Medication 100 MILLIGRAM(S): at 14:19

## 2019-08-23 RX ADMIN — SODIUM CHLORIDE 120 MILLILITER(S): 9 INJECTION, SOLUTION INTRAVENOUS at 01:58

## 2019-08-23 NOTE — PROGRESS NOTE ADULT - SUBJECTIVE AND OBJECTIVE BOX
POST OPERATIVE DAY #: 0  STATUS POST: s/p ORIF of left wrist                   SUBJECTIVE: Patient seen and examined. States to doing well. Denies any pain, cp, sob. has mild numbnes/tingling of lower extremity but improving.     Pain:  well controlled      OBJECTIVE:     Vital Signs Last 24 Hrs  T(C): 35.8 (23 Aug 2019 13:22), Max: 37.2 (22 Aug 2019 15:27)  T(F): 96.5 (23 Aug 2019 13:22), Max: 98.9 (22 Aug 2019 15:27)  HR: 60 (23 Aug 2019 13:22) (56 - 81)  BP: 168/74 (23 Aug 2019 13:22) (133/67 - 179/80)  BP(mean): 106 (23 Aug 2019 13:22) (92 - 114)  RR: 18 (23 Aug 2019 13:22) (16 - 22)  SpO2: 100% (23 Aug 2019 13:22) (95% - 100%)    Affected extremity:          Dressing: clean/dry/intact          Sensation: intact to light touch          Motor exam: able to move fingers 1-5,  strength improving         warm, well-perfused; capillary refill < 3 seconds              I&O's Detail    23 Aug 2019 07:01  -  23 Aug 2019 13:48  --------------------------------------------------------  IN:    lactated ringers.: 200 mL  Total IN: 200 mL    OUT:  Total OUT: 0 mL    Total NET: 200 mL          LABS:                        12.6   8.12  )-----------( 270      ( 23 Aug 2019 07:48 )             38.9     08-    142  |  107  |  14  ----------------------------<  95  4.5   |  23  |  1.13    Ca    9.1      23 Aug 2019 07:48    TPro  7.4  /  Alb  3.9  /  TBili  0.5  /  DBili  x   /  AST  16  /  ALT  11  /  AlkPhos  90      PT/INR - ( 22 Aug 2019 18:39 )   PT: 11.6 sec;   INR: 1.03          PTT - ( 22 Aug 2019 18:39 )  PTT:35.3 sec  Urinalysis Basic - ( 22 Aug 2019 20:22 )    Color: Yellow / Appearance: SL Cloudy / S.025 / pH: x  Gluc: x / Ketone: Trace mg/dL  / Bili: Small / Urobili: 0.2 E.U./dL   Blood: x / Protein: NEGATIVE mg/dL / Nitrite: NEGATIVE   Leuk Esterase: Small / RBC: 5-10 /HPF / WBC > 10 /HPF   Sq Epi: x / Non Sq Epi: Many /HPF / Bacteria: Present /HPF        MEDICATIONS:  ceFAZolin  Injectable. 2000 milliGRAM(s) IV Push every 8 hours    acetaminophen   Tablet .. 650 milliGRAM(s) Oral every 4 hours PRN  HYDROmorphone  Injectable 0.5 milliGRAM(s) IV Push every 15 minutes PRN  HYDROmorphone  Injectable 0.5 milliGRAM(s) IV Push every 4 hours PRN  ondansetron Injectable 4 milliGRAM(s) IV Push every 6 hours PRN  oxyCODONE    IR 5 milliGRAM(s) Oral every 4 hours PRN  oxyCODONE    IR 10 milliGRAM(s) Oral every 4 hours PRN    dabigatran 150 milliGRAM(s) Oral every 12 hours      RADIOLOGY & ADDITIONAL STUDIES:    ASSESSMENT AND PLAN:     1. Analgesic pain control  2. DVT prophylaxis: Pradaxa      SCDs    3. Continue PT  4. Weight Bearing Status:       NWB   5. Disposition: Pending

## 2019-08-23 NOTE — H&P ADULT - NSICDXPASTSURGICALHX_GEN_ALL_CORE_FT
PAST SURGICAL HISTORY:  Cataract Cataract    Presence of cardiac pacemaker s/p 2011    Status post percutaneous transluminal coronary angioplasty s/p 2012

## 2019-08-23 NOTE — H&P ADULT - NSICDXPASTMEDICALHX_GEN_ALL_CORE_FT
PAST MEDICAL HISTORY:  Atherosclerosis of coronary artery CAD (coronary artery disease)    Diverticulosis of large intestine Diverticulosis    Essential hypertension HTN (hypertension)    Sarcoidosis Sarcoidosis    Transient cerebral ischemia TIA (transient ischemic attack)

## 2019-08-23 NOTE — BRIEF OPERATIVE NOTE - NSICDXBRIEFPROCEDURE_GEN_ALL_CORE_FT
PROCEDURES:  Open reduction and internal fixation of 3 or more fragments of distal radius 23-Aug-2019 12:01:27  Miki Maciel

## 2019-08-23 NOTE — H&P ADULT - HISTORY OF PRESENT ILLNESS
83F RHD s/p FOOSH 8/18 to L side with left distal radius fracture s/p reduction admitted for ORIF. No other injuries. L wrist in sugar tong splint, dressing clean and dry. Neurovasc intact. Denies numbness or tingling.

## 2019-08-23 NOTE — H&P ADULT - NSHPPHYSICALEXAM_GEN_ALL_CORE
Gen: Awake and alert  LUE in sugar tong splint, dressing clean and ry  Wiggles fingers  Sensation intact around fingers   BCR

## 2019-08-24 ENCOUNTER — TRANSCRIPTION ENCOUNTER (OUTPATIENT)
Age: 84
End: 2019-08-24

## 2019-08-24 VITALS
OXYGEN SATURATION: 96 % | RESPIRATION RATE: 16 BRPM | TEMPERATURE: 98 F | SYSTOLIC BLOOD PRESSURE: 120 MMHG | DIASTOLIC BLOOD PRESSURE: 58 MMHG | HEART RATE: 60 BPM

## 2019-08-24 LAB
ANION GAP SERPL CALC-SCNC: 9 MMOL/L — SIGNIFICANT CHANGE UP (ref 5–17)
BUN SERPL-MCNC: 10 MG/DL — SIGNIFICANT CHANGE UP (ref 7–23)
CALCIUM SERPL-MCNC: 8.3 MG/DL — LOW (ref 8.4–10.5)
CHLORIDE SERPL-SCNC: 106 MMOL/L — SIGNIFICANT CHANGE UP (ref 96–108)
CO2 SERPL-SCNC: 26 MMOL/L — SIGNIFICANT CHANGE UP (ref 22–31)
CREAT SERPL-MCNC: 1 MG/DL — SIGNIFICANT CHANGE UP (ref 0.5–1.3)
GLUCOSE SERPL-MCNC: 117 MG/DL — HIGH (ref 70–99)
HCT VFR BLD CALC: 37.9 % — SIGNIFICANT CHANGE UP (ref 34.5–45)
HGB BLD-MCNC: 12.1 G/DL — SIGNIFICANT CHANGE UP (ref 11.5–15.5)
MCHC RBC-ENTMCNC: 29.5 PG — SIGNIFICANT CHANGE UP (ref 27–34)
MCHC RBC-ENTMCNC: 31.9 GM/DL — LOW (ref 32–36)
MCV RBC AUTO: 92.4 FL — SIGNIFICANT CHANGE UP (ref 80–100)
NRBC # BLD: 0 /100 WBCS — SIGNIFICANT CHANGE UP (ref 0–0)
PLATELET # BLD AUTO: 263 K/UL — SIGNIFICANT CHANGE UP (ref 150–400)
POTASSIUM SERPL-MCNC: 4.8 MMOL/L — SIGNIFICANT CHANGE UP (ref 3.5–5.3)
POTASSIUM SERPL-SCNC: 4.8 MMOL/L — SIGNIFICANT CHANGE UP (ref 3.5–5.3)
RBC # BLD: 4.1 M/UL — SIGNIFICANT CHANGE UP (ref 3.8–5.2)
RBC # FLD: 12.8 % — SIGNIFICANT CHANGE UP (ref 10.3–14.5)
SODIUM SERPL-SCNC: 141 MMOL/L — SIGNIFICANT CHANGE UP (ref 135–145)
WBC # BLD: 11.15 K/UL — HIGH (ref 3.8–10.5)
WBC # FLD AUTO: 11.15 K/UL — HIGH (ref 3.8–10.5)

## 2019-08-24 RX ORDER — OXYCODONE HYDROCHLORIDE 5 MG/1
1 TABLET ORAL
Qty: 42 | Refills: 0
Start: 2019-08-24 | End: 2019-08-30

## 2019-08-24 RX ORDER — AMLODIPINE BESYLATE 2.5 MG/1
5 TABLET ORAL DAILY
Refills: 0 | Status: DISCONTINUED | OUTPATIENT
Start: 2019-08-24 | End: 2019-08-24

## 2019-08-24 RX ORDER — DOCUSATE SODIUM 100 MG
1 CAPSULE ORAL
Qty: 21 | Refills: 0
Start: 2019-08-24 | End: 2019-08-30

## 2019-08-24 RX ADMIN — Medication 100 MILLIGRAM(S): at 05:10

## 2019-08-24 RX ADMIN — Medication 25 MILLIGRAM(S): at 05:10

## 2019-08-24 RX ADMIN — POLYETHYLENE GLYCOL 3350 17 GRAM(S): 17 POWDER, FOR SOLUTION ORAL at 12:55

## 2019-08-24 RX ADMIN — OXYCODONE HYDROCHLORIDE 10 MILLIGRAM(S): 5 TABLET ORAL at 07:34

## 2019-08-24 RX ADMIN — Medication 100 MILLIGRAM(S): at 12:55

## 2019-08-24 RX ADMIN — Medication 112 MICROGRAM(S): at 05:10

## 2019-08-24 RX ADMIN — OXYCODONE HYDROCHLORIDE 10 MILLIGRAM(S): 5 TABLET ORAL at 07:07

## 2019-08-24 RX ADMIN — Medication 2000 MILLIGRAM(S): at 02:10

## 2019-08-24 RX ADMIN — Medication 20 MILLIGRAM(S): at 05:10

## 2019-08-24 RX ADMIN — OXYCODONE HYDROCHLORIDE 10 MILLIGRAM(S): 5 TABLET ORAL at 13:33

## 2019-08-24 RX ADMIN — Medication 1 SPRAY(S): at 05:11

## 2019-08-24 RX ADMIN — OXYCODONE HYDROCHLORIDE 10 MILLIGRAM(S): 5 TABLET ORAL at 12:56

## 2019-08-24 RX ADMIN — DABIGATRAN ETEXILATE MESYLATE 150 MILLIGRAM(S): 150 CAPSULE ORAL at 05:11

## 2019-08-24 NOTE — DISCHARGE NOTE PROVIDER - NSDCCPTREATMENT_GEN_ALL_CORE_FT
PRINCIPAL PROCEDURE  Procedure: Open reduction and internal fixation of 3 or more fragments of distal radius  Findings and Treatment:

## 2019-08-24 NOTE — PROGRESS NOTE ADULT - ASSESSMENT
A/P: 83yFemale POD1 s/p L distal radius ORIF  - Stable  - Pain/Nausea Control  - Home meds  - DVT ppx: Pradaxa 150 Q12  - WBS: TRUDI REDDING  - Patient to be d/c'ed home today      Ortho Pager [phone]

## 2019-08-24 NOTE — DISCHARGE NOTE NURSING/CASE MANAGEMENT/SOCIAL WORK - NSDCDPATPORTLINK_GEN_ALL_CORE
You can access the [facility], offered by [facility] [employer], by registering with the following website: [email or web address]

## 2019-08-24 NOTE — PROGRESS NOTE ADULT - SUBJECTIVE AND OBJECTIVE BOX
Ortho Note    Pt comfortable without complaints, pain controlled  Denies CP, SOB, N/V, numbness/tingling     Vital Signs Last 24 Hrs  T(C): 36.9 (08-24-19 @ 08:30), Max: 36.9 (08-24-19 @ 05:04)  T(F): 98.4 (08-24-19 @ 08:30), Max: 98.4 (08-24-19 @ 05:04)  HR: 60 (08-24-19 @ 08:30) (60 - 68)  BP: 120/58 (08-24-19 @ 08:30) (110/54 - 120/58)  BP(mean): --  RR: 16 (08-24-19 @ 08:30) (16 - 16)  SpO2: 96% (08-24-19 @ 08:30) (96% - 97%)    VSS  General: A&Ox3, NAD  LUE: Short arm splint C/D/I  Pulses: Fingers WWP; Cap refill < 2 sec  Sensation: SILT in fingers  Motor: AIN/PIN/U motor intact to fingers                          12.1   11.15 )-----------( 263      ( 24 Aug 2019 05:50 )             37.9   24 Aug 2019 05:50    141    |  106    |  10     ----------------------------<  117    4.8     |  26     |  1.00     Ca    8.3        24 Aug 2019 05:50    TPro  7.4    /  Alb  3.9    /  TBili  0.5    /  DBili  x      /  AST  16     /  ALT  11     /  AlkPhos  90     22 Aug 2019 18:39

## 2019-08-24 NOTE — DISCHARGE NOTE PROVIDER - NSDCFUADDINST_GEN_ALL_CORE_FT
You are NON weight bearing on your left upper extremity.  No strenuous activity, heavy lifting, driving or returning to work until cleared by MD.    Keep your splint clean and dry at all times.     Try to have regular bowel movements, take stool softener or laxative if necessary.  May take pepcid or zantac for upset stomach.    Swelling is normal and will subside in a few weeks. Keep left upper extremity elevated.   Follow up with Dr. Alberto to schedule an appt, if you have staples or sutures they will be removed in office.  Contact your doctor if you experience: fever greater than 101.5, chills, chest pain, difficulty breathing, redness or excessive drainage around the incision, other concerns.

## 2019-08-24 NOTE — DISCHARGE NOTE PROVIDER - CARE PROVIDER_API CALL
Grant Alberto)  Orthopaedic Surgery  [address]  [place], [place] [address]  Phone: [phone]  Fax: [phone]  Follow Up Time:

## 2019-08-24 NOTE — DISCHARGE NOTE PROVIDER - HOSPITAL COURSE
Admit to Orthopaedics for left wrist open reduction internal fixation     Perioperative Antibiotics    DVT prophylaxis - SCDs    Pain Management

## 2019-08-24 NOTE — DISCHARGE NOTE PROVIDER - NSDCCPCAREPLAN_GEN_ALL_CORE_FT
PRINCIPAL DISCHARGE DIAGNOSIS  Diagnosis: Fracture of left distal radius  Assessment and Plan of Treatment:

## 2019-08-28 ENCOUNTER — APPOINTMENT (OUTPATIENT)
Dept: PULMONOLOGY | Facility: CLINIC | Age: 84
End: 2019-08-28
Payer: MEDICARE

## 2019-08-28 VITALS
HEIGHT: 60 IN | TEMPERATURE: 98 F | OXYGEN SATURATION: 97 % | DIASTOLIC BLOOD PRESSURE: 80 MMHG | BODY MASS INDEX: 30.9 KG/M2 | SYSTOLIC BLOOD PRESSURE: 145 MMHG | WEIGHT: 157.4 LBS | HEART RATE: 89 BPM

## 2019-08-28 DIAGNOSIS — I10 ESSENTIAL (PRIMARY) HYPERTENSION: ICD-10-CM

## 2019-08-28 DIAGNOSIS — Z95.0 PRESENCE OF CARDIAC PACEMAKER: ICD-10-CM

## 2019-08-28 DIAGNOSIS — S52.502A UNSPECIFIED FRACTURE OF THE LOWER END OF LEFT RADIUS, INITIAL ENCOUNTER FOR CLOSED FRACTURE: ICD-10-CM

## 2019-08-28 DIAGNOSIS — Z91.040 LATEX ALLERGY STATUS: ICD-10-CM

## 2019-08-28 DIAGNOSIS — Z98.61 CORONARY ANGIOPLASTY STATUS: ICD-10-CM

## 2019-08-28 DIAGNOSIS — D86.9 SARCOIDOSIS, UNSPECIFIED: ICD-10-CM

## 2019-08-28 DIAGNOSIS — I25.10 ATHEROSCLEROTIC HEART DISEASE OF NATIVE CORONARY ARTERY WITHOUT ANGINA PECTORIS: ICD-10-CM
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
## 2019-08-28 DIAGNOSIS — K57.30 DIVERTICULOSIS OF LARGE INTESTINE WITHOUT PERFORATION OR ABSCESS WITHOUT BLEEDING: ICD-10-CM

## 2019-08-28 DIAGNOSIS — Z86.73 PERSONAL HISTORY OF TRANSIENT ISCHEMIC ATTACK (TIA), AND CEREBRAL INFARCTION WITHOUT RESIDUAL DEFICITS: ICD-10-CM

## 2019-08-28 DIAGNOSIS — Z88.6 ALLERGY STATUS TO ANALGESIC AGENT: ICD-10-CM

## 2019-08-28 PROCEDURE — 86850 RBC ANTIBODY SCREEN: CPT

## 2019-08-28 PROCEDURE — 71046 X-RAY EXAM CHEST 2 VIEWS: CPT

## 2019-08-28 PROCEDURE — 99285 EMERGENCY DEPT VISIT HI MDM: CPT | Mod: 25

## 2019-08-28 PROCEDURE — 80048 BASIC METABOLIC PNL TOTAL CA: CPT

## 2019-08-28 PROCEDURE — 85027 COMPLETE CBC AUTOMATED: CPT

## 2019-08-28 PROCEDURE — 73100 X-RAY EXAM OF WRIST: CPT

## 2019-08-28 PROCEDURE — 93005 ELECTROCARDIOGRAM TRACING: CPT

## 2019-08-28 PROCEDURE — 81001 URINALYSIS AUTO W/SCOPE: CPT

## 2019-08-28 PROCEDURE — C1713: CPT

## 2019-08-28 PROCEDURE — 99214 OFFICE O/P EST MOD 30 MIN: CPT

## 2019-08-28 PROCEDURE — 87641 MR-STAPH DNA AMP PROBE: CPT

## 2019-08-28 PROCEDURE — 85730 THROMBOPLASTIN TIME PARTIAL: CPT

## 2019-08-28 PROCEDURE — 94640 AIRWAY INHALATION TREATMENT: CPT

## 2019-08-28 PROCEDURE — 80053 COMPREHEN METABOLIC PANEL: CPT

## 2019-08-28 PROCEDURE — 86901 BLOOD TYPING SEROLOGIC RH(D): CPT

## 2019-08-28 PROCEDURE — 36415 COLL VENOUS BLD VENIPUNCTURE: CPT

## 2019-08-28 PROCEDURE — 76000 FLUOROSCOPY <1 HR PHYS/QHP: CPT

## 2019-08-28 PROCEDURE — 85025 COMPLETE CBC W/AUTO DIFF WBC: CPT

## 2019-08-28 PROCEDURE — 86900 BLOOD TYPING SEROLOGIC ABO: CPT

## 2019-08-28 PROCEDURE — 85610 PROTHROMBIN TIME: CPT

## 2019-08-28 NOTE — ASSESSMENT
[FreeTextEntry1] : Wrist fracture\par - Scheduled for f/u appointment with Dr. Alberto next Tuesday 9/3/19\par \par \par Hypothyroidism\par - TSH in July was.01. Dr. Riggs decreased Synthroid medication in July to 75- repeated August and still reduced TSH .01.. Will repeat TSH level in 1 -2 month.\par \par TIA\par - Currently on Pradaxa. Following with Neuro (Dr. Vila). Last CT head negative for hemorrhage. Patient to continue Pradaxa, \par \par Coronary artery disease.\par - Patient is clinically stable and no evidence of angina nor CHF. She is to continue on the current regimen. She is followed by the cardiologist and will follow on ECHO\par \par Atrial fibrillation\par - Patient was in sinus and rate is controlled with beta blockers. The patient is on pradaxa without any signs of bleeding.  rate is controlled and on anticoagulation\par \par Hypertension\par - The blood pressure was controlled and she is to continue on amlodipine and metoprolol. She is to continue on Lasix every other day.  Had a long discussion about amlodipine and it being a CCB not an antibiotic.\par \par Asthma\par - Patient is continue on albuterol as-needed basis in addition to Advair.  she rarely using her JUDITH\par \par Sarcoidosis\par - Patient is stable no evidence of acute episode.  Follow with repeat PFT \par \par \par PFT due 3/2020 (ordered)\par Colonoscopy August 2019- WNL- told by Dr. Gutierrez no more colonoscopies needed.\par Last CT scan chest 3/2018\par \par \par RTC in 3 Months

## 2019-08-28 NOTE — HISTORY OF PRESENT ILLNESS
[FreeTextEntry1] : Pt presents today for follow up visit. Patient fell over the weekend and fractured her wrist. [de-identified] : She is doing well.  She feels something is wrong however in 2011 she had twitching to the face.  Now she is having a warmth on the left side of the face since Saturday.  The sensation is intermittent with numbness and tingling in the 2 finger to left hand.  Pt without vision change, headaches, no numbness or tingling to area on face.  She feels her balance is off, without any falls.  She refuses to use a cane.\par \par Her breathing is good. she is rarely requiring the JUDITH and using the Advair.  Denies fever, change in appetite, change in BM, chest pain or palpations. \par \par \par [8/28/19]\par Patient s/p colonoscopy last week with Dr. Gutierrez presents for follow up visit. Of note patient reports that she fell last weekend and fractured her wrist. Also hit her head. Fell Saturday and went Sunday morning to ER. Had surgery with Dr. Alberto but he doesn't take insurance for follow up care. CT scan of head was negative for hemorrhage &  CT cervical spine revealed no fracture cervical spinal fractures.

## 2019-08-28 NOTE — PHYSICAL EXAM
[Well Nourished] : well nourished [No Acute Distress] : no acute distress [Well-Appearing] : well-appearing [Well Developed] : well developed [Normal Sclera/Conjunctiva] : normal sclera/conjunctiva [PERRL] : pupils equal round and reactive to light [EOMI] : extraocular movements intact [Normal Outer Ear/Nose] : the outer ears and nose were normal in appearance [Normal Oropharynx] : the oropharynx was normal [No JVD] : no jugular venous distention [Thyroid Normal, No Nodules] : the thyroid was normal and there were no nodules present [Supple] : supple [No Lymphadenopathy] : no lymphadenopathy [Clear to Auscultation] : lungs were clear to auscultation bilaterally [No Respiratory Distress] : no respiratory distress  [No Accessory Muscle Use] : no accessory muscle use [Normal Rate] : normal rate  [Regular Rhythm] : with a regular rhythm [Normal S1, S2] : normal S1 and S2 [No Murmur] : no murmur heard [No Carotid Bruits] : no carotid bruits [No Abdominal Bruit] : a ~M bruit was not heard ~T in the abdomen [No Varicosities] : no varicosities [No Edema] : there was no peripheral edema [Pedal Pulses Present] : the pedal pulses are present [No Extremity Clubbing/Cyanosis] : no extremity clubbing/cyanosis [No Palpable Aorta] : no palpable aorta [Soft] : abdomen soft [Non Tender] : non-tender [Non-distended] : non-distended [No Masses] : no abdominal mass palpated [No HSM] : no HSM [Normal Bowel Sounds] : normal bowel sounds [Normal Posterior Cervical Nodes] : no posterior cervical lymphadenopathy [Normal Anterior Cervical Nodes] : no anterior cervical lymphadenopathy [No CVA Tenderness] : no CVA  tenderness [No Spinal Tenderness] : no spinal tenderness [No Rash] : no rash [Coordination Grossly Intact] : coordination grossly intact [Normal Gait] : normal gait [No Focal Deficits] : no focal deficits [Deep Tendon Reflexes (DTR)] : deep tendon reflexes were 2+ and symmetric [Normal Affect] : the affect was normal [Normal Insight/Judgement] : insight and judgment were intact [de-identified] : +wrist fracture (left)- in a cast post-op

## 2019-09-03 ENCOUNTER — OUTPATIENT (OUTPATIENT)
Dept: OUTPATIENT SERVICES | Facility: HOSPITAL | Age: 84
LOS: 1 days | End: 2019-09-03
Payer: MEDICARE

## 2019-09-03 PROCEDURE — 73110 X-RAY EXAM OF WRIST: CPT | Mod: 26,LT

## 2019-09-03 PROCEDURE — 73110 X-RAY EXAM OF WRIST: CPT

## 2019-10-31 ENCOUNTER — RX RENEWAL (OUTPATIENT)
Age: 84
End: 2019-10-31

## 2020-01-24 ENCOUNTER — APPOINTMENT (OUTPATIENT)
Dept: PULMONOLOGY | Facility: CLINIC | Age: 85
End: 2020-01-24
Payer: MEDICARE

## 2020-01-24 PROCEDURE — 36415 COLL VENOUS BLD VENIPUNCTURE: CPT

## 2020-01-27 LAB
T3 SERPL-MCNC: 92 NG/DL
T4 SERPL-MCNC: 10 UG/DL
TSH SERPL-ACNC: 2.68 UIU/ML

## 2020-03-06 ENCOUNTER — LABORATORY RESULT (OUTPATIENT)
Age: 85
End: 2020-03-06

## 2020-03-06 ENCOUNTER — APPOINTMENT (OUTPATIENT)
Dept: PULMONOLOGY | Facility: CLINIC | Age: 85
End: 2020-03-06
Payer: MEDICARE

## 2020-03-06 VITALS
HEIGHT: 60 IN | HEART RATE: 65 BPM | SYSTOLIC BLOOD PRESSURE: 130 MMHG | BODY MASS INDEX: 31.41 KG/M2 | OXYGEN SATURATION: 97 % | RESPIRATION RATE: 12 BRPM | DIASTOLIC BLOOD PRESSURE: 90 MMHG | WEIGHT: 160 LBS | TEMPERATURE: 97.5 F

## 2020-03-06 PROCEDURE — 99214 OFFICE O/P EST MOD 30 MIN: CPT | Mod: 25

## 2020-03-06 PROCEDURE — 36415 COLL VENOUS BLD VENIPUNCTURE: CPT

## 2020-03-06 NOTE — REVIEW OF SYSTEMS
[Wheezing] : wheezing [Cough] : cough [Dyspnea on Exertion] : dyspnea on exertion [Headache] : headache [Negative] : Heme/Lymph

## 2020-03-06 NOTE — ASSESSMENT
[FreeTextEntry1] : Wrist fracture\par - healed\par \par \par Hypothyroidism\par - TSH in July was.01.  She is to repeat the labs \par \par TIA\par - Currently on Pradaxa. Following with Neuro (Dr. Vila). Last CT head negative for hemorrhage. Patient to continue Pradaxa, \par \par Coronary artery disease.\par - Patient is clinically stable and no evidence of angina nor CHF. She is to continue on the current regimen. She is followed by the cardiologist \par \par Atrial fibrillation\par - Patient was in sinus and rate is controlled with beta blockers. The patient is on pradaxa without any signs of bleeding.  rate is controlled and on anticoagulation\par \par Hypertension\par - The blood pressure was controlled and she is to continue on amlodipine and metoprolol. She is to continue on Lasix every other day.  \par \par Asthma\par - Patient is continue on albuterol as-needed basis in addition to Advair.  she rarely using her JUDITH\par \par Sarcoidosis\par - Patient is stable no evidence of acute episode.  Follow with repeat PFT scheduled for Tuesday\par \par Allergic rhinitis\par \par she is to restart flonase and Claritin  Cough is related to postnasal drip\par \par \par PFT due 3/2020 (ordered)\par Colonoscopy August 2019- WNL- told by Dr. Gutierrez no more colonoscopies needed.\par Last CT scan chest 3/2018\par Last CXR 6/19\par for mammogram\par \par \par RTC in 3 Months

## 2020-03-06 NOTE — HISTORY OF PRESENT ILLNESS
[FreeTextEntry1] : she is having a HA [de-identified] : she is coughing, no fever, no chills.  She is wheezing.  She was in [place] and she was wheezing over there.  No palpation.  No swelling of the leg. Appetite is good.  She is have the same weight.  She is clearing her throat and coughing

## 2020-03-06 NOTE — REVIEW OF SYSTEMS
[Shortness Of Breath] : no shortness of breath [Dizziness] : no dizziness [Fainting] : no fainting [Confusion] : no confusion [Memory Loss] : no memory loss [Unsteady Walking] : no ataxia

## 2020-03-07 LAB
25(OH)D3 SERPL-MCNC: 33 NG/ML
ALBUMIN SERPL ELPH-MCNC: 4.5 G/DL
ALP BLD-CCNC: 82 U/L
ALT SERPL-CCNC: 12 U/L
ANION GAP SERPL CALC-SCNC: 16 MMOL/L
APPEARANCE: CLEAR
AST SERPL-CCNC: 28 U/L
BASOPHILS # BLD AUTO: 0.1 K/UL
BASOPHILS NFR BLD AUTO: 1.2 %
BILIRUB SERPL-MCNC: 0.4 MG/DL
BILIRUBIN URINE: NEGATIVE
BLOOD URINE: NORMAL
BUN SERPL-MCNC: 11 MG/DL
CALCIUM SERPL-MCNC: 9.6 MG/DL
CHLORIDE SERPL-SCNC: 105 MMOL/L
CHOLEST SERPL-MCNC: 176 MG/DL
CHOLEST/HDLC SERPL: 3.2 RATIO
CO2 SERPL-SCNC: 22 MMOL/L
COLOR: YELLOW
CREAT SERPL-MCNC: 1.16 MG/DL
EOSINOPHIL # BLD AUTO: 0.32 K/UL
EOSINOPHIL NFR BLD AUTO: 3.7 %
ESTIMATED AVERAGE GLUCOSE: 120 MG/DL
GLUCOSE QUALITATIVE U: NEGATIVE
GLUCOSE SERPL-MCNC: 100 MG/DL
HBA1C MFR BLD HPLC: 5.8 %
HCT VFR BLD CALC: 47.7 %
HDLC SERPL-MCNC: 55 MG/DL
HGB BLD-MCNC: 14.6 G/DL
IMM GRANULOCYTES NFR BLD AUTO: 0.2 %
KETONES URINE: NEGATIVE
LDLC SERPL CALC-MCNC: 92 MG/DL
LEUKOCYTE ESTERASE URINE: ABNORMAL
LYMPHOCYTES # BLD AUTO: 2.54 K/UL
LYMPHOCYTES NFR BLD AUTO: 29.5 %
MAN DIFF?: NORMAL
MCHC RBC-ENTMCNC: 30.6 GM/DL
MCHC RBC-ENTMCNC: 30.6 PG
MCV RBC AUTO: 100 FL
MONOCYTES # BLD AUTO: 0.88 K/UL
MONOCYTES NFR BLD AUTO: 10.2 %
NEUTROPHILS # BLD AUTO: 4.76 K/UL
NEUTROPHILS NFR BLD AUTO: 55.2 %
NITRITE URINE: NEGATIVE
PH URINE: 5.5
PLATELET # BLD AUTO: 250 K/UL
POTASSIUM SERPL-SCNC: 4.5 MMOL/L
PROT SERPL-MCNC: 6.8 G/DL
PROTEIN URINE: NEGATIVE
RBC # BLD: 4.77 M/UL
RBC # FLD: 13.2 %
SODIUM SERPL-SCNC: 143 MMOL/L
SPECIFIC GRAVITY URINE: 1.01
T3 SERPL-MCNC: 88 NG/DL
T4 SERPL-MCNC: 9.1 UG/DL
TRIGL SERPL-MCNC: 146 MG/DL
TSH SERPL-ACNC: 3.43 UIU/ML
UROBILINOGEN URINE: NORMAL
WBC # FLD AUTO: 8.62 K/UL

## 2020-03-10 ENCOUNTER — APPOINTMENT (OUTPATIENT)
Dept: HEART AND VASCULAR | Facility: CLINIC | Age: 85
End: 2020-03-10
Payer: MEDICARE

## 2020-03-10 ENCOUNTER — APPOINTMENT (OUTPATIENT)
Dept: PULMONOLOGY | Facility: CLINIC | Age: 85
End: 2020-03-10

## 2020-03-10 VITALS
BODY MASS INDEX: 31.41 KG/M2 | DIASTOLIC BLOOD PRESSURE: 70 MMHG | SYSTOLIC BLOOD PRESSURE: 144 MMHG | HEART RATE: 84 BPM | WEIGHT: 160 LBS | HEIGHT: 60 IN

## 2020-03-10 PROCEDURE — 99214 OFFICE O/P EST MOD 30 MIN: CPT | Mod: 25

## 2020-03-10 PROCEDURE — 93280 PM DEVICE PROGR EVAL DUAL: CPT

## 2020-03-10 NOTE — PROCEDURE
[No] : not [NSR] : normal sinus rhythm [Pacemaker] : pacemaker [Voltage: ___ volts] : Voltage was [unfilled] volts [Lead Imp:  ___ohms] : lead impedance was [unfilled] ohms [Sensing Amplitude ___mv] : sensing amplitude was [unfilled] mv [___V @] : [unfilled] V [___ ms] : [unfilled] ms [None] : none [Asense-Vsense ___ %] : Asense-Vsense [unfilled]% [Apace-Vsense ___ %] : Apace-Vsense [unfilled]% [de-identified] : Medtronic [de-identified] : Ana [de-identified] : 12/17/12 [de-identified] : AAIR-DDDR [de-identified] : 97-46 [de-identified] : longevity 4 y [de-identified] : Longest AF episode ~ 3 hours 3/6/20 \par [place] 0.1%\par AP VS 77.5%

## 2020-03-10 NOTE — HISTORY OF PRESENT ILLNESS
[de-identified] : This is an 84 year old female with SSS S/P PPM, HTN, Afib, CAD and TIA/CVA. She was previously on Xarelto.  Records reflect an episode of gum bleeding (requiring packing) while on Xarelto, as well as possible GI ulcer and significant bruising.  Later started on Eliquis and reported significant bruising.  Eliquis was switched to Pradaxa and she continues to feel the bruising has been less on this agent.  Sees neurology and GI (Dr. Gutierrez).  Sees Dr. Fatima for CAD hx stent.  H/o gait instability- evaluated by Dr. Rick,  no acute infarct/hemorrhages.  OK to continue Pradaxa.  She denies any ongoing gait disturbances.  She offers no acute complaints today.

## 2020-03-10 NOTE — PHYSICAL EXAM
[No Acute Distress] : no acute distress [Well Nourished] : well nourished [Well Developed] : well developed [Well-Appearing] : well-appearing [Normal Sclera/Conjunctiva] : normal sclera/conjunctiva [PERRL] : pupils equal round and reactive to light [EOMI] : extraocular movements intact [Normal Outer Ear/Nose] : the outer ears and nose were normal in appearance [Normal Oropharynx] : the oropharynx was normal [No JVD] : no jugular venous distention [No Lymphadenopathy] : no lymphadenopathy [Supple] : supple [Thyroid Normal, No Nodules] : the thyroid was normal and there were no nodules present [No Respiratory Distress] : no respiratory distress  [No Accessory Muscle Use] : no accessory muscle use [Clear to Auscultation] : lungs were clear to auscultation bilaterally [Normal Rate] : normal rate  [Regular Rhythm] : with a regular rhythm [Normal S1, S2] : normal S1 and S2 [No Murmur] : no murmur heard [No Carotid Bruits] : no carotid bruits [No Abdominal Bruit] : a ~M bruit was not heard ~T in the abdomen [No Varicosities] : no varicosities [Pedal Pulses Present] : the pedal pulses are present [No Edema] : there was no peripheral edema [No Palpable Aorta] : no palpable aorta [No Extremity Clubbing/Cyanosis] : no extremity clubbing/cyanosis [Soft] : abdomen soft [Non Tender] : non-tender [Non-distended] : non-distended [No Masses] : no abdominal mass palpated [No HSM] : no HSM [Normal Bowel Sounds] : normal bowel sounds [Normal Posterior Cervical Nodes] : no posterior cervical lymphadenopathy [Normal Anterior Cervical Nodes] : no anterior cervical lymphadenopathy [No CVA Tenderness] : no CVA  tenderness [No Spinal Tenderness] : no spinal tenderness [No Joint Swelling] : no joint swelling [Grossly Normal Strength/Tone] : grossly normal strength/tone [No Rash] : no rash [Coordination Grossly Intact] : coordination grossly intact [No Focal Deficits] : no focal deficits [Normal Gait] : normal gait [Deep Tendon Reflexes (DTR)] : deep tendon reflexes were 2+ and symmetric [Normal Affect] : the affect was normal [Normal Insight/Judgement] : insight and judgment were intact [General Appearance - Well Developed] : well developed [Normal Appearance] : normal appearance [Well Groomed] : well groomed [General Appearance - Well Nourished] : well nourished [No Deformities] : no deformities [General Appearance - In No Acute Distress] : no acute distress [Left Infraclavicular] : left infraclavicular area [Clean] : clean [Dry] : dry [Well-Healed] : well-healed [Palpable Crepitus] : no palpable crepitus [Bleeding] : no active bleeding [Foul Odor] : no foul smell [Purulent Drainage] : no purulent drainage [Serosanguineous Drainage] : no serosanquineous drainage [Serous Drainage] : no serous drainage [Erythema] : not erythematous [Warm] : not warm [Tender] : not tender [Indurated] : not indurated [Fluctuant] : not fluctuant

## 2020-06-03 ENCOUNTER — APPOINTMENT (OUTPATIENT)
Dept: PULMONOLOGY | Facility: CLINIC | Age: 85
End: 2020-06-03
Payer: MEDICARE

## 2020-06-03 VITALS
RESPIRATION RATE: 12 BRPM | HEART RATE: 85 BPM | BODY MASS INDEX: 30.43 KG/M2 | WEIGHT: 155 LBS | HEIGHT: 60 IN | DIASTOLIC BLOOD PRESSURE: 83 MMHG | SYSTOLIC BLOOD PRESSURE: 153 MMHG | TEMPERATURE: 97.8 F | OXYGEN SATURATION: 99 %

## 2020-06-03 PROCEDURE — 99214 OFFICE O/P EST MOD 30 MIN: CPT

## 2020-06-03 NOTE — PHYSICAL EXAM
[No Acute Distress] : no acute distress [Well Nourished] : well nourished [Well-Appearing] : well-appearing [Well Developed] : well developed [Normal Sclera/Conjunctiva] : normal sclera/conjunctiva [PERRL] : pupils equal round and reactive to light [EOMI] : extraocular movements intact [Normal Outer Ear/Nose] : the outer ears and nose were normal in appearance [Normal Oropharynx] : the oropharynx was normal [No JVD] : no jugular venous distention [Supple] : supple [No Lymphadenopathy] : no lymphadenopathy [No Respiratory Distress] : no respiratory distress  [Thyroid Normal, No Nodules] : the thyroid was normal and there were no nodules present [No Accessory Muscle Use] : no accessory muscle use [Clear to Auscultation] : lungs were clear to auscultation bilaterally [Normal Rate] : normal rate  [Regular Rhythm] : with a regular rhythm [Normal S1, S2] : normal S1 and S2 [No Murmur] : no murmur heard [No Carotid Bruits] : no carotid bruits [No Abdominal Bruit] : a ~M bruit was not heard ~T in the abdomen [Pedal Pulses Present] : the pedal pulses are present [No Varicosities] : no varicosities [No Edema] : there was no peripheral edema [No Palpable Aorta] : no palpable aorta [No Extremity Clubbing/Cyanosis] : no extremity clubbing/cyanosis [Soft] : abdomen soft [Non Tender] : non-tender [Non-distended] : non-distended [No Masses] : no abdominal mass palpated [No HSM] : no HSM [Normal Bowel Sounds] : normal bowel sounds [Normal Posterior Cervical Nodes] : no posterior cervical lymphadenopathy [Normal Anterior Cervical Nodes] : no anterior cervical lymphadenopathy [No CVA Tenderness] : no CVA  tenderness [No Spinal Tenderness] : no spinal tenderness [No Joint Swelling] : no joint swelling [Grossly Normal Strength/Tone] : grossly normal strength/tone [No Rash] : no rash [Coordination Grossly Intact] : coordination grossly intact [No Focal Deficits] : no focal deficits [Normal Gait] : normal gait [Deep Tendon Reflexes (DTR)] : deep tendon reflexes were 2+ and symmetric [Normal Affect] : the affect was normal [Normal Insight/Judgement] : insight and judgment were intact

## 2020-06-03 NOTE — HISTORY OF PRESENT ILLNESS
[FreeTextEntry1] : she is concerned about her oxygen level [de-identified] : the insurance company checked her sat and was 93 and she is concerned.  She did more exercise by gardening but she is not complaining of anything

## 2020-06-03 NOTE — ASSESSMENT
[FreeTextEntry1] : Wrist fracture\par - healed\par \par \par Hypothyroidism\par - TSH in July was.01.  She is to repeat the labs next visit\par \par TIA\par - Currently on Pradaxa. Following with Neuro (Dr. Vila). Last CT head negative for hemorrhage. Patient to continue Pradaxa, \par \par Coronary artery disease.\par - Patient is clinically stable and no evidence of angina nor CHF. She is to continue on the current regimen. She is followed by the cardiologist \par \par Atrial fibrillation\par - Patient was in sinus and rate is controlled with beta blockers. The patient is on pradaxa without any signs of bleeding.  rate is controlled and on anticoagulation\par \par Hypertension\par - The blood pressure was controlled and she is to continue on amlodipine and metoprolol. She is to continue on Lasix every other day.  \par \par Asthma\par - Patient is continue on albuterol as-needed basis in addition to Advair.  she rarely using her JUDITH\par \par Sarcoidosis\par - Patient is stable no evidence of acute episode.  Follow with repeat PFT scheduled for Tuesday\par \par Allergic rhinitis\par \par she is to restart flonase and Claritin  Cough is related to postnasal drip\par \par \par PFT due 3/2020 ([facility] PFT lab is operational)\par Colonoscopy August 2019- WNL- told by Dr. Gutierrez no more colonoscopies needed.\par Last CT scan chest 3/2018\par Last CXR 6/19\par for mammogram\par sat is normal and FS is 119.  HbA1C decreassed\par \par RTC in 3 Months

## 2020-07-16 ENCOUNTER — RESULT REVIEW (OUTPATIENT)
Age: 85
End: 2020-07-16

## 2020-07-16 ENCOUNTER — APPOINTMENT (OUTPATIENT)
Dept: ENDOCRINOLOGY | Facility: CLINIC | Age: 85
End: 2020-07-16
Payer: MEDICARE

## 2020-07-16 ENCOUNTER — OUTPATIENT (OUTPATIENT)
Dept: OUTPATIENT SERVICES | Facility: HOSPITAL | Age: 85
LOS: 1 days | End: 2020-07-16
Payer: MEDICARE

## 2020-07-16 ENCOUNTER — APPOINTMENT (OUTPATIENT)
Dept: PULMONOLOGY | Facility: CLINIC | Age: 85
End: 2020-07-16
Payer: MEDICARE

## 2020-07-16 ENCOUNTER — NON-APPOINTMENT (OUTPATIENT)
Age: 85
End: 2020-07-16

## 2020-07-16 VITALS
DIASTOLIC BLOOD PRESSURE: 74 MMHG | SYSTOLIC BLOOD PRESSURE: 115 MMHG | HEART RATE: 91 BPM | BODY MASS INDEX: 30.27 KG/M2 | WEIGHT: 155 LBS

## 2020-07-16 VITALS
BODY MASS INDEX: 30.23 KG/M2 | DIASTOLIC BLOOD PRESSURE: 84 MMHG | SYSTOLIC BLOOD PRESSURE: 126 MMHG | TEMPERATURE: 98.5 F | HEART RATE: 91 BPM | HEIGHT: 60 IN | OXYGEN SATURATION: 97 % | WEIGHT: 154 LBS

## 2020-07-16 PROCEDURE — 36415 COLL VENOUS BLD VENIPUNCTURE: CPT

## 2020-07-16 PROCEDURE — 93000 ELECTROCARDIOGRAM COMPLETE: CPT

## 2020-07-16 PROCEDURE — 71046 X-RAY EXAM CHEST 2 VIEWS: CPT | Mod: 26

## 2020-07-16 PROCEDURE — 99213 OFFICE O/P EST LOW 20 MIN: CPT | Mod: 25

## 2020-07-16 PROCEDURE — 99214 OFFICE O/P EST MOD 30 MIN: CPT | Mod: 25

## 2020-07-16 PROCEDURE — 71046 X-RAY EXAM CHEST 2 VIEWS: CPT

## 2020-07-16 NOTE — ASSESSMENT
[FreeTextEntry1] : Wrist fracture\par - healed\par \par \par Hypothyroidism\par - TSH in July was.01.  She is to repeat the labs\par \par TIA\par - Currently on Pradaxa. Following with Neuro (Dr. Vila). Last CT head negative for hemorrhage. Patient to continue Pradaxa, \par \par Coronary artery disease.\par - Patient is clinically stable and no evidence of angina nor CHF. She is to continue on the current regimen. She is followed by the cardiologist .  Nio change in EKG\par \par Atrial fibrillation\par - Patient was in sinus and rate is controlled with beta blockers. The patient is on pradaxa without any signs of bleeding.  rate is controlled and on anticoagulation. rate is sinus\par \par Hypertension\par - The blood pressure was controlled and she is to continue on amlodipine and metoprolol. She is to continue on Lasix every other day.  \par \par Asthma\par - Patient is continue on albuterol as-needed basis in addition to Advair.  she rarely using her JUDITH\par \par Sarcoidosis\par - Patient is stable no evidence of acute episode.  Follow with repeat CXR\par \par Allergic rhinitis\par \par she is to restart flonase and Claritin  Cough is related to postnasal drip\par \par \par PFT due 3/2020 ([facility] PFT lab is operational)\par Colonoscopy August 2019- WNL- told by Dr. Gutierrez no more colonoscopies needed.\par Last CT scan chest 3/2018\par Last CXR 6/19\par for mammogram\par sat is normal and FS is 119.  HbA1C decreassed\par \par RTC in 3 Months

## 2020-07-16 NOTE — PHYSICAL EXAM
[Well Nourished] : well nourished [No Acute Distress] : no acute distress [Well Developed] : well developed [Normal Sclera/Conjunctiva] : normal sclera/conjunctiva [Well-Appearing] : well-appearing [EOMI] : extraocular movements intact [PERRL] : pupils equal round and reactive to light [Normal Outer Ear/Nose] : the outer ears and nose were normal in appearance [Normal Oropharynx] : the oropharynx was normal [No JVD] : no jugular venous distention [Supple] : supple [No Lymphadenopathy] : no lymphadenopathy [Thyroid Normal, No Nodules] : the thyroid was normal and there were no nodules present [No Accessory Muscle Use] : no accessory muscle use [No Respiratory Distress] : no respiratory distress  [Clear to Auscultation] : lungs were clear to auscultation bilaterally [Normal Rate] : normal rate  [Normal S1, S2] : normal S1 and S2 [No Murmur] : no murmur heard [Regular Rhythm] : with a regular rhythm [No Carotid Bruits] : no carotid bruits [No Abdominal Bruit] : a ~M bruit was not heard ~T in the abdomen [No Varicosities] : no varicosities [Pedal Pulses Present] : the pedal pulses are present [No Edema] : there was no peripheral edema [No Palpable Aorta] : no palpable aorta [No Extremity Clubbing/Cyanosis] : no extremity clubbing/cyanosis [Soft] : abdomen soft [Non-distended] : non-distended [Non Tender] : non-tender [No Masses] : no abdominal mass palpated [No HSM] : no HSM [Normal Bowel Sounds] : normal bowel sounds [Normal Posterior Cervical Nodes] : no posterior cervical lymphadenopathy [Normal Anterior Cervical Nodes] : no anterior cervical lymphadenopathy [No CVA Tenderness] : no CVA  tenderness [No Spinal Tenderness] : no spinal tenderness [No Joint Swelling] : no joint swelling [Grossly Normal Strength/Tone] : grossly normal strength/tone [No Rash] : no rash [Coordination Grossly Intact] : coordination grossly intact [No Focal Deficits] : no focal deficits [Normal Gait] : normal gait [Deep Tendon Reflexes (DTR)] : deep tendon reflexes were 2+ and symmetric [Normal Affect] : the affect was normal [Normal Insight/Judgement] : insight and judgment were intact

## 2020-07-16 NOTE — HISTORY OF PRESENT ILLNESS
[FreeTextEntry1] : she is following on her condition and she is worried about her EKG [de-identified] : she is doing well.  she is scheduled for mammogram.  No issues also will check her x-ray. She was out in concerned about her heart rate of one EKG. Her asthma stable. She rarely uses albuterol. She is due for a mammogram. She'll be coming back and 6 months from [place] and will followup on the nomogram

## 2020-07-17 NOTE — ASSESSMENT
[FreeTextEntry1] : 83 y/o F pt with:\par \par 1. Hx of hypothyroidism:\par \par Sent labs today\par \par 2. Prediabetes; A1c 5.8% in 3/2020. \par \par Return in: 2 month

## 2020-07-17 NOTE — ADDENDUM
[FreeTextEntry1] : I, Victorino Albrecht, acted solely as a scribe for Dr. José Cohen on this date. 07/16/2020.

## 2020-07-17 NOTE — REASON FOR VISIT
[Initial Evaluation] : an initial evaluation [Hypothyroidism] : hypothyroidism [FreeTextEntry2] : Dr. Oneil Lynn

## 2020-07-17 NOTE — END OF VISIT
[FreeTextEntry3] : All medical record entries made by the Scribe were at my, Dr. José Cohen, direction and personally dictated by me on 07/16/2020. I have reviewed the chart and agree that the record accurately reflects my personal performance of the history, physical exam, assessment and plan. I have also personally directed, reviewed and agreed with the chart.

## 2020-07-17 NOTE — HISTORY OF PRESENT ILLNESS
[FreeTextEntry1] : 85 y/o F pt, with Hx of hypothyroidism, referred by Dr. Oneil Lynn, presents today to establish endocrine care with me.\par Other PMHx: Sarcoidosis, HTN, HLD, Atrial fibrillation, CAD, Pacemaker, TIA (2014)\par \par 07/16/2020\par Review of her labs: Her TSH was 0.01 in 8/2019 which normalized in 1/2020 (2.68). \par Review of Dr. Lynn note on 7/16/20: Asthma - Pt is on Albuterol. Sarcoidosis is stable. \par \par Pt reports that she had to leave for [place] in 7/2019, so she had labs done in [place] and send the records to her PCP. Her PCP changed her Levothyroxine dose after reviewing her labs, but pt is not aware of the dose. She has been feeling well since then, but decided to follow with the specialist. \par \par Pt presents today, with c/o SOB (due to asthma and sarcoidosis). Otherwise, she is feeling well.  Pt follows regularly with her pulmonologist and cardiologist.\par Denies tiredness. \par \par Current Medications: Levothyroxine, Metoprolol 50 mg, Pradaxa, Pravastatin\par \par Most recent lab studies: \par - 3/6/20: TSH 3.43, Total T4 9.1, Total T3 88, A1c 5.8%, s.creat 1.16, , LDL-c 92, Vit D 25-OH 33\par - 8/15/19: TSH 0.01, Total T3 137

## 2020-07-17 NOTE — PHYSICAL EXAM
[No Tremors] : no tremors [Alert] : alert [Normal Sclera/Conjunctiva] : normal sclera/conjunctiva [Normal Outer Ear/Nose] : the ears and nose were normal in appearance [Thyroid Not Enlarged] : the thyroid was not enlarged [No Neck Mass] : no neck mass was observed [No Thyroid Nodules] : no palpable thyroid nodules [No Respiratory Distress] : no respiratory distress [No Edema] : no peripheral edema [Regular Rhythm] : with a regular rhythm [Normal Rate] : heart rate was normal [Spine Straight] : spine straight [Normal Bowel Sounds] : normal bowel sounds [Normal Gait] : normal gait [No Rash] : no rash [No Stigmata of Cushings Syndrome] : no stigmata of Cushings Syndrome [Normal Reflexes] : deep tendon reflexes were 2+ and symmetric [Oriented x3] : oriented to person, place, and time [de-identified] : thyroid not palpated

## 2020-07-20 LAB
ALBUMIN SERPL ELPH-MCNC: 4.4 G/DL
ALP BLD-CCNC: 76 U/L
ALT SERPL-CCNC: 16 U/L
ANION GAP SERPL CALC-SCNC: 13 MMOL/L
AST SERPL-CCNC: 22 U/L
BASOPHILS # BLD AUTO: 0.06 K/UL
BASOPHILS NFR BLD AUTO: 1.1 %
BILIRUB SERPL-MCNC: 0.6 MG/DL
BUN SERPL-MCNC: 9 MG/DL
CALCIUM SERPL-MCNC: 9.4 MG/DL
CHLORIDE SERPL-SCNC: 105 MMOL/L
CHOLEST SERPL-MCNC: 203 MG/DL
CHOLEST/HDLC SERPL: 4.2 RATIO
CO2 SERPL-SCNC: 24 MMOL/L
CREAT SERPL-MCNC: 1.04 MG/DL
EOSINOPHIL # BLD AUTO: 0.26 K/UL
EOSINOPHIL NFR BLD AUTO: 4.6 %
GLUCOSE SERPL-MCNC: 95 MG/DL
HCT VFR BLD CALC: 43.4 %
HDLC SERPL-MCNC: 49 MG/DL
HGB BLD-MCNC: 13.6 G/DL
IMM GRANULOCYTES NFR BLD AUTO: 0.4 %
LDLC SERPL CALC-MCNC: 125 MG/DL
LYMPHOCYTES # BLD AUTO: 1.63 K/UL
LYMPHOCYTES NFR BLD AUTO: 28.8 %
MAN DIFF?: NORMAL
MCHC RBC-ENTMCNC: 30.4 PG
MCHC RBC-ENTMCNC: 31.3 GM/DL
MCV RBC AUTO: 96.9 FL
MONOCYTES # BLD AUTO: 0.56 K/UL
MONOCYTES NFR BLD AUTO: 9.9 %
NEUTROPHILS # BLD AUTO: 3.13 K/UL
NEUTROPHILS NFR BLD AUTO: 55.2 %
PLATELET # BLD AUTO: 241 K/UL
POTASSIUM SERPL-SCNC: 4.8 MMOL/L
PROT SERPL-MCNC: 6.8 G/DL
RBC # BLD: 4.48 M/UL
RBC # FLD: 13.2 %
SARS-COV-2 IGG SERPL IA-ACNC: <0.1 INDEX
SARS-COV-2 IGG SERPL QL IA: NEGATIVE
SODIUM SERPL-SCNC: 142 MMOL/L
TRIGL SERPL-MCNC: 146 MG/DL
TSH SERPL-ACNC: 3.46 UIU/ML
WBC # FLD AUTO: 5.66 K/UL

## 2020-07-21 LAB
25(OH)D3 SERPL-MCNC: 29.6 NG/ML
ALBUMIN SERPL ELPH-MCNC: 4.3 G/DL
ALP BLD-CCNC: 73 U/L
ALT SERPL-CCNC: 19 U/L
ANION GAP SERPL CALC-SCNC: 16 MMOL/L
AST SERPL-CCNC: 20 U/L
BILIRUB SERPL-MCNC: 0.4 MG/DL
BUN SERPL-MCNC: 9 MG/DL
CALCIUM SERPL-MCNC: 9.4 MG/DL
CHLORIDE SERPL-SCNC: 104 MMOL/L
CHOLEST SERPL-MCNC: 192 MG/DL
CHOLEST/HDLC SERPL: 4.1 RATIO
CO2 SERPL-SCNC: 23 MMOL/L
CREAT SERPL-MCNC: 1.14 MG/DL
ESTIMATED AVERAGE GLUCOSE: 120 MG/DL
GLUCOSE SERPL-MCNC: 144 MG/DL
HBA1C MFR BLD HPLC: 5.8 %
HDLC SERPL-MCNC: 47 MG/DL
LDLC SERPL CALC-MCNC: 113 MG/DL
POTASSIUM SERPL-SCNC: 3.8 MMOL/L
PROT SERPL-MCNC: 6.6 G/DL
SODIUM SERPL-SCNC: 143 MMOL/L
T3FREE SERPL-MCNC: 2.25 PG/ML
T4 FREE SERPL-MCNC: 1.3 NG/DL
THYROGLOB AB SERPL-ACNC: <20 IU/ML
THYROPEROXIDASE AB SERPL IA-ACNC: 10.9 IU/ML
TRIGL SERPL-MCNC: 157 MG/DL
TSH SERPL-ACNC: 3 UIU/ML

## 2020-07-30 ENCOUNTER — APPOINTMENT (OUTPATIENT)
Dept: HEART AND VASCULAR | Facility: CLINIC | Age: 85
End: 2020-07-30
Payer: MEDICARE

## 2020-07-30 VITALS
OXYGEN SATURATION: 98 % | BODY MASS INDEX: 30.23 KG/M2 | SYSTOLIC BLOOD PRESSURE: 128 MMHG | HEART RATE: 80 BPM | WEIGHT: 154 LBS | HEIGHT: 60 IN | TEMPERATURE: 98.7 F | DIASTOLIC BLOOD PRESSURE: 69 MMHG

## 2020-07-30 PROCEDURE — 99212 OFFICE O/P EST SF 10 MIN: CPT | Mod: 25

## 2020-07-30 PROCEDURE — 93280 PM DEVICE PROGR EVAL DUAL: CPT

## 2020-07-30 NOTE — PROCEDURE
[No] : not [Sinus Bradycardia] : sinus bradycardia [Pacemaker] : pacemaker [Voltage: ___ volts] : Voltage was [unfilled] volts [Lead Imp:  ___ohms] : lead impedance was [unfilled] ohms [___V @] : [unfilled] V [Sensing Amplitude ___mv] : sensing amplitude was [unfilled] mv [None] : none [___ ms] : [unfilled] ms [de-identified] : Medtronic [de-identified] : Ana [de-identified] : AAIR-DDDR [de-identified] : 12/17/12 [de-identified] : 74-36 [de-identified] : longevity 3 y [de-identified] : Longest AF episode ~ 3.5h\par [place] 0.2%\par AP VS 87.6%

## 2020-07-30 NOTE — HISTORY OF PRESENT ILLNESS
[Palpitations] : no palpitations [SOB] : no dyspnea [Dizziness] : no dizziness [Syncope] : no syncope [Chest Pain] : no chest pain or discomfort [Pain at Site] : no pain at device site [de-identified] : This is an 84 year old female with SSS S/P PPM, HTN, Afib, CAD and TIA/CVA. She was previously on Xarelto.  Records reflect an episode of gum bleeding (requiring packing) while on Xarelto, as well as possible GI ulcer and significant bruising.  Later started on Eliquis and reported significant bruising.  Eliquis was switched to Pradaxa and she continues to feel the bruising has been less on this agent.  Sees neurology and GI (Dr. Gutierrez).  Sees Dr. Fatima for CAD hx stent.  H/o gait instability- evaluated by Dr. Rick,  no acute infarct/hemorrhages.  OK to continue Pradaxa.  She denies any ongoing gait disturbances.  She offers no acute complaints today.\par \par Planning to travel to [place] for 6 months in the next couple of weeks.

## 2020-07-30 NOTE — PHYSICAL EXAM
[General Appearance - Well Developed] : well developed [Normal Appearance] : normal appearance [Well Groomed] : well groomed [General Appearance - Well Nourished] : well nourished [No Deformities] : no deformities [General Appearance - In No Acute Distress] : no acute distress [Left Infraclavicular] : left infraclavicular area [Clean] : clean [Dry] : dry [Well-Healed] : well-healed [Bleeding] : no active bleeding [Palpable Crepitus] : no palpable crepitus [Purulent Drainage] : no purulent drainage [Foul Odor] : no foul smell [Serous Drainage] : no serous drainage [Erythema] : not erythematous [Serosanguineous Drainage] : no serosanquineous drainage [Indurated] : not indurated [Warm] : not warm [Tender] : not tender [Fluctuant] : not fluctuant

## 2020-08-21 LAB — SARS-COV-2 N GENE NPH QL NAA+PROBE: NOT DETECTED

## 2021-03-08 ENCOUNTER — NON-APPOINTMENT (OUTPATIENT)
Age: 86
End: 2021-03-08

## 2021-03-08 ENCOUNTER — APPOINTMENT (OUTPATIENT)
Dept: PULMONOLOGY | Facility: CLINIC | Age: 86
End: 2021-03-08
Payer: MEDICARE

## 2021-03-08 ENCOUNTER — LABORATORY RESULT (OUTPATIENT)
Age: 86
End: 2021-03-08

## 2021-03-08 VITALS
DIASTOLIC BLOOD PRESSURE: 76 MMHG | SYSTOLIC BLOOD PRESSURE: 167 MMHG | OXYGEN SATURATION: 98 % | HEIGHT: 60 IN | WEIGHT: 161 LBS | HEART RATE: 86 BPM | BODY MASS INDEX: 31.61 KG/M2

## 2021-03-08 PROCEDURE — 99214 OFFICE O/P EST MOD 30 MIN: CPT | Mod: 25

## 2021-03-08 PROCEDURE — 99072 ADDL SUPL MATRL&STAF TM PHE: CPT

## 2021-03-08 PROCEDURE — 93000 ELECTROCARDIOGRAM COMPLETE: CPT

## 2021-03-08 PROCEDURE — 36415 COLL VENOUS BLD VENIPUNCTURE: CPT

## 2021-03-08 NOTE — HISTORY OF PRESENT ILLNESS
[FreeTextEntry1] : she is worse after the covid vaccine [de-identified] : Patient was doing well in [place].  She had a new hearing aid but she started complaining of pain on the left side of the body from the leg up to the chest and she feels that she had a heart attack.  She was told that the hearing aid will cause had a.  She also complaining of worsening of her asthma after she took the first dose of the Covid.  She is wheezing.  She using the inhaler about 3 times a day.  She lost 3 pounds since she came back from [place].  She is compliant with her diet

## 2021-03-08 NOTE — ASSESSMENT
[FreeTextEntry1] : Rhonchal asthma\par \par The patient is clinically having minor episode of exacerbation most likely related to the viral vaccination.  Advair 500/51 puff twice daily.  Patient is to continue on the troll as needed basis.  Hold on systemic steroid at this point.  I started the patient on azithromycin for as anti-inflammatory agent whether antimicrobial agent patient will develop condition.\par \par Acquired hypothyroidism\par \par The patient is to continue on Synthroid and will follow up on the TSH level\par \par Coronary artery disease\par \par Patient will follow up with another cardiologist on referral from her friend of hers.  The EKG was unchanged and no evidence of cardiac ischemia.  The patient is to follow-up with cardiology at this point.\par \par Atrial fibrillation currently rate is controlled and she is on Pradaxa\par \par Hypertension the blood pressure is stable on the current regimen\par \par CVA\par \par The patient did not have any recent neurological deficit.  The patient is on antiplatelet and and Pradaxa\par \par Sarcoidosis\par \par Stable\par \par Abnormal mammogram\par \par The patient was given prescription for ultrasound and mammogram of the breast.  She missed the last year because of Covid

## 2021-03-08 NOTE — REVIEW OF SYSTEMS
[Shortness Of Breath] : shortness of breath [Wheezing] : wheezing [Dyspnea on Exertion] : dyspnea on exertion [Negative] : Heme/Lymph [Cough] : no cough

## 2021-03-08 NOTE — PHYSICAL EXAM
[No Acute Distress] : no acute distress [Well Nourished] : well nourished [Well Developed] : well developed [Well-Appearing] : well-appearing [Normal Sclera/Conjunctiva] : normal sclera/conjunctiva [PERRL] : pupils equal round and reactive to light [EOMI] : extraocular movements intact [Normal Outer Ear/Nose] : the outer ears and nose were normal in appearance [Normal Oropharynx] : the oropharynx was normal [No JVD] : no jugular venous distention [No Lymphadenopathy] : no lymphadenopathy [Supple] : supple [Thyroid Normal, No Nodules] : the thyroid was normal and there were no nodules present [No Respiratory Distress] : no respiratory distress  [No Accessory Muscle Use] : no accessory muscle use [Normal Rate] : normal rate  [Regular Rhythm] : with a regular rhythm [Normal S1, S2] : normal S1 and S2 [No Murmur] : no murmur heard [No Carotid Bruits] : no carotid bruits [No Abdominal Bruit] : a ~M bruit was not heard ~T in the abdomen [No Varicosities] : no varicosities [Pedal Pulses Present] : the pedal pulses are present [No Edema] : there was no peripheral edema [No Palpable Aorta] : no palpable aorta [No Extremity Clubbing/Cyanosis] : no extremity clubbing/cyanosis [Soft] : abdomen soft [Non Tender] : non-tender [Non-distended] : non-distended [No Masses] : no abdominal mass palpated [No HSM] : no HSM [Normal Bowel Sounds] : normal bowel sounds [Normal Posterior Cervical Nodes] : no posterior cervical lymphadenopathy [Normal Anterior Cervical Nodes] : no anterior cervical lymphadenopathy [No CVA Tenderness] : no CVA  tenderness [No Spinal Tenderness] : no spinal tenderness [No Joint Swelling] : no joint swelling [Grossly Normal Strength/Tone] : grossly normal strength/tone [No Rash] : no rash [Coordination Grossly Intact] : coordination grossly intact [No Focal Deficits] : no focal deficits [Normal Gait] : normal gait [Deep Tendon Reflexes (DTR)] : deep tendon reflexes were 2+ and symmetric [Normal Affect] : the affect was normal [Normal Insight/Judgement] : insight and judgment were intact [de-identified] : bl wheeze

## 2021-03-12 LAB
25(OH)D3 SERPL-MCNC: 26.2 NG/ML
ALBUMIN SERPL ELPH-MCNC: 4.1 G/DL
ALP BLD-CCNC: 81 U/L
ALT SERPL-CCNC: 8 U/L
ANION GAP SERPL CALC-SCNC: 10 MMOL/L
APPEARANCE: CLEAR
AST SERPL-CCNC: 16 U/L
BASOPHILS # BLD AUTO: 0.06 K/UL
BASOPHILS NFR BLD AUTO: 0.9 %
BILIRUB SERPL-MCNC: 0.3 MG/DL
BILIRUBIN URINE: NEGATIVE
BLOOD URINE: NEGATIVE
BUN SERPL-MCNC: 17 MG/DL
CALCIUM SERPL-MCNC: 9.2 MG/DL
CHLORIDE SERPL-SCNC: 107 MMOL/L
CHOLEST SERPL-MCNC: 153 MG/DL
CO2 SERPL-SCNC: 23 MMOL/L
COLOR: NORMAL
CREAT SERPL-MCNC: 1.01 MG/DL
EOSINOPHIL # BLD AUTO: 0.24 K/UL
EOSINOPHIL NFR BLD AUTO: 3.7 %
ESTIMATED AVERAGE GLUCOSE: 126 MG/DL
GLUCOSE QUALITATIVE U: NEGATIVE
GLUCOSE SERPL-MCNC: 102 MG/DL
HBA1C MFR BLD HPLC: 6 %
HCT VFR BLD CALC: 41.4 %
HDLC SERPL-MCNC: 43 MG/DL
HGB BLD-MCNC: 13.2 G/DL
IMM GRANULOCYTES NFR BLD AUTO: 0.3 %
KETONES URINE: NEGATIVE
LDLC SERPL CALC-MCNC: 73 MG/DL
LEUKOCYTE ESTERASE URINE: ABNORMAL
LYMPHOCYTES # BLD AUTO: 1.84 K/UL
LYMPHOCYTES NFR BLD AUTO: 28.5 %
MAN DIFF?: NORMAL
MCHC RBC-ENTMCNC: 30.4 PG
MCHC RBC-ENTMCNC: 31.9 GM/DL
MCV RBC AUTO: 95.4 FL
MONOCYTES # BLD AUTO: 0.76 K/UL
MONOCYTES NFR BLD AUTO: 11.8 %
NEUTROPHILS # BLD AUTO: 3.54 K/UL
NEUTROPHILS NFR BLD AUTO: 54.8 %
NITRITE URINE: NEGATIVE
NONHDLC SERPL-MCNC: 110 MG/DL
PH URINE: 5
PLATELET # BLD AUTO: 243 K/UL
POTASSIUM SERPL-SCNC: 4.5 MMOL/L
PROT SERPL-MCNC: 6.7 G/DL
PROTEIN URINE: NEGATIVE
RBC # BLD: 4.34 M/UL
RBC # FLD: 12.7 %
SODIUM SERPL-SCNC: 140 MMOL/L
SPECIFIC GRAVITY URINE: 1.01
T3 SERPL-MCNC: 107 NG/DL
T4 SERPL-MCNC: 9.4 UG/DL
TRIGL SERPL-MCNC: 188 MG/DL
TSH SERPL-ACNC: 0.43 UIU/ML
UROBILINOGEN URINE: NORMAL
WBC # FLD AUTO: 6.46 K/UL

## 2021-03-17 ENCOUNTER — APPOINTMENT (OUTPATIENT)
Dept: ULTRASOUND IMAGING | Facility: CLINIC | Age: 86
End: 2021-03-17

## 2021-03-30 ENCOUNTER — APPOINTMENT (OUTPATIENT)
Dept: HEART AND VASCULAR | Facility: CLINIC | Age: 86
End: 2021-03-30
Payer: COMMERCIAL

## 2021-03-30 VITALS
WEIGHT: 160 LBS | BODY MASS INDEX: 31.41 KG/M2 | HEART RATE: 88 BPM | TEMPERATURE: 98.1 F | HEIGHT: 60 IN | DIASTOLIC BLOOD PRESSURE: 90 MMHG | SYSTOLIC BLOOD PRESSURE: 180 MMHG

## 2021-03-30 PROCEDURE — 99213 OFFICE O/P EST LOW 20 MIN: CPT | Mod: 25

## 2021-03-30 PROCEDURE — 99072 ADDL SUPL MATRL&STAF TM PHE: CPT

## 2021-03-30 PROCEDURE — 93280 PM DEVICE PROGR EVAL DUAL: CPT

## 2021-03-30 NOTE — PROCEDURE
[No] : not [Sinus Bradycardia] : sinus bradycardia [Pacemaker] : pacemaker [Voltage: ___ volts] : Voltage was [unfilled] volts [Lead Imp:  ___ohms] : lead impedance was [unfilled] ohms [Sensing Amplitude ___mv] : sensing amplitude was [unfilled] mv [___V @] : [unfilled] V [___ ms] : [unfilled] ms [None] : none [de-identified] : [employer] [de-identified] : Ana [de-identified] : 12/17/12 [de-identified] : AAIR-DDDR [de-identified] : 51-51 [de-identified] : longevity 3 y [de-identified] : Longest AF episode ~ 12 hours on 3/19/21\par Exchange 0.9%\par AP VS 91%

## 2021-03-30 NOTE — HISTORY OF PRESENT ILLNESS
[Palpitations] : no palpitations [SOB] : no dyspnea [Syncope] : no syncope [Dizziness] : no dizziness [Chest Pain] : no chest pain or discomfort [Pain at Site] : no pain at device site [de-identified] : This is an 85 year old female with SSS S/P PPM, HTN, Afib, CAD and TIA/CVA. She was previously on Xarelto.  Records reflect an episode of gum bleeding (requiring packing) while on Xarelto, as well as possible GI ulcer and significant bruising.  Later started on Eliquis and reported significant bruising.  Eliquis was switched to Pradaxa and she continues to feel the bruising has been less on this agent.  Sees neurology and GI (Dr. Gutierrez).  Sees Dr. Fatima for CAD hx stent.  H/o gait instability- evaluated by Dr. Rick,  no acute infarct/hemorrhages.  OK to continue Pradaxa.  She denies any ongoing gait disturbances.  She feels well and offers no acute complaints today.

## 2021-04-02 ENCOUNTER — RESULT REVIEW (OUTPATIENT)
Age: 86
End: 2021-04-02

## 2021-04-02 ENCOUNTER — APPOINTMENT (OUTPATIENT)
Dept: ULTRASOUND IMAGING | Facility: CLINIC | Age: 86
End: 2021-04-02

## 2021-04-02 ENCOUNTER — APPOINTMENT (OUTPATIENT)
Dept: MAMMOGRAPHY | Facility: CLINIC | Age: 86
End: 2021-04-02

## 2021-04-02 ENCOUNTER — OUTPATIENT (OUTPATIENT)
Dept: OUTPATIENT SERVICES | Facility: HOSPITAL | Age: 86
LOS: 1 days | End: 2021-04-02

## 2021-04-02 ENCOUNTER — TRANSCRIPTION ENCOUNTER (OUTPATIENT)
Age: 86
End: 2021-04-02

## 2021-04-02 ENCOUNTER — APPOINTMENT (OUTPATIENT)
Dept: RADIOLOGY | Facility: CLINIC | Age: 86
End: 2021-04-02
Payer: MEDICARE

## 2021-04-02 PROCEDURE — 77067 SCR MAMMO BI INCL CAD: CPT | Mod: 26

## 2021-04-02 PROCEDURE — 77063 BREAST TOMOSYNTHESIS BI: CPT | Mod: 26

## 2021-04-02 PROCEDURE — 71046 X-RAY EXAM CHEST 2 VIEWS: CPT | Mod: 26

## 2021-04-02 PROCEDURE — 76641 ULTRASOUND BREAST COMPLETE: CPT | Mod: 26,50

## 2021-04-06 ENCOUNTER — NON-APPOINTMENT (OUTPATIENT)
Age: 86
End: 2021-04-06

## 2021-05-04 ENCOUNTER — EMERGENCY (EMERGENCY)
Facility: HOSPITAL | Age: 86
LOS: 1 days | Discharge: HOME | End: 2021-05-04
Attending: EMERGENCY MEDICINE | Admitting: EMERGENCY MEDICINE
Payer: MEDICARE

## 2021-05-04 VITALS
TEMPERATURE: 98 F | HEIGHT: 60 IN | RESPIRATION RATE: 18 BRPM | OXYGEN SATURATION: 99 % | SYSTOLIC BLOOD PRESSURE: 183 MMHG | WEIGHT: 158.95 LBS | HEART RATE: 83 BPM | DIASTOLIC BLOOD PRESSURE: 76 MMHG

## 2021-05-04 VITALS
HEART RATE: 88 BPM | SYSTOLIC BLOOD PRESSURE: 137 MMHG | DIASTOLIC BLOOD PRESSURE: 73 MMHG | TEMPERATURE: 98 F | OXYGEN SATURATION: 97 % | RESPIRATION RATE: 18 BRPM

## 2021-05-04 DIAGNOSIS — I10 ESSENTIAL (PRIMARY) HYPERTENSION: ICD-10-CM

## 2021-05-04 DIAGNOSIS — D86.9 SARCOIDOSIS, UNSPECIFIED: ICD-10-CM

## 2021-05-04 DIAGNOSIS — Z87.19 PERSONAL HISTORY OF OTHER DISEASES OF THE DIGESTIVE SYSTEM: ICD-10-CM

## 2021-05-04 DIAGNOSIS — R06.2 WHEEZING: ICD-10-CM

## 2021-05-04 DIAGNOSIS — Z88.5 ALLERGY STATUS TO NARCOTIC AGENT: ICD-10-CM

## 2021-05-04 DIAGNOSIS — Z20.822 CONTACT WITH AND (SUSPECTED) EXPOSURE TO COVID-19: ICD-10-CM

## 2021-05-04 DIAGNOSIS — Z79.01 LONG TERM (CURRENT) USE OF ANTICOAGULANTS: ICD-10-CM

## 2021-05-04 DIAGNOSIS — Z91.040 LATEX ALLERGY STATUS: ICD-10-CM

## 2021-05-04 DIAGNOSIS — Z79.899 OTHER LONG TERM (CURRENT) DRUG THERAPY: ICD-10-CM

## 2021-05-04 DIAGNOSIS — I25.10 ATHEROSCLEROTIC HEART DISEASE OF NATIVE CORONARY ARTERY WITHOUT ANGINA PECTORIS: ICD-10-CM

## 2021-05-04 DIAGNOSIS — Z86.73 PERSONAL HISTORY OF TRANSIENT ISCHEMIC ATTACK (TIA), AND CEREBRAL INFARCTION WITHOUT RESIDUAL DEFICITS: ICD-10-CM

## 2021-05-04 DIAGNOSIS — Z95.0 PRESENCE OF CARDIAC PACEMAKER: ICD-10-CM

## 2021-05-04 LAB
ALBUMIN SERPL ELPH-MCNC: 4.1 G/DL — SIGNIFICANT CHANGE UP (ref 3.3–5)
ALP SERPL-CCNC: 83 U/L — SIGNIFICANT CHANGE UP (ref 40–120)
ALT FLD-CCNC: 15 U/L — SIGNIFICANT CHANGE UP (ref 10–45)
ANION GAP SERPL CALC-SCNC: 12 MMOL/L — SIGNIFICANT CHANGE UP (ref 5–17)
APTT BLD: 83.6 SEC — HIGH (ref 27.5–35.5)
AST SERPL-CCNC: 19 U/L — SIGNIFICANT CHANGE UP (ref 10–40)
BASOPHILS # BLD AUTO: 0.05 K/UL — SIGNIFICANT CHANGE UP (ref 0–0.2)
BASOPHILS NFR BLD AUTO: 0.7 % — SIGNIFICANT CHANGE UP (ref 0–2)
BILIRUB SERPL-MCNC: 0.4 MG/DL — SIGNIFICANT CHANGE UP (ref 0.2–1.2)
BUN SERPL-MCNC: 9 MG/DL — SIGNIFICANT CHANGE UP (ref 7–23)
CALCIUM SERPL-MCNC: 9.3 MG/DL — SIGNIFICANT CHANGE UP (ref 8.4–10.5)
CHLORIDE SERPL-SCNC: 105 MMOL/L — SIGNIFICANT CHANGE UP (ref 96–108)
CK MB CFR SERPL CALC: 1.7 NG/ML — SIGNIFICANT CHANGE UP (ref 0–6.7)
CK SERPL-CCNC: 52 U/L — SIGNIFICANT CHANGE UP (ref 25–170)
CO2 SERPL-SCNC: 25 MMOL/L — SIGNIFICANT CHANGE UP (ref 22–31)
CREAT SERPL-MCNC: 0.94 MG/DL — SIGNIFICANT CHANGE UP (ref 0.5–1.3)
EOSINOPHIL # BLD AUTO: 0.52 K/UL — HIGH (ref 0–0.5)
EOSINOPHIL NFR BLD AUTO: 7.4 % — HIGH (ref 0–6)
GLUCOSE SERPL-MCNC: 153 MG/DL — HIGH (ref 70–99)
HCT VFR BLD CALC: 45.2 % — HIGH (ref 34.5–45)
HGB BLD-MCNC: 14.7 G/DL — SIGNIFICANT CHANGE UP (ref 11.5–15.5)
IMM GRANULOCYTES NFR BLD AUTO: 0.6 % — SIGNIFICANT CHANGE UP (ref 0–1.5)
INR BLD: 1.41 — HIGH (ref 0.88–1.16)
LYMPHOCYTES # BLD AUTO: 1.7 K/UL — SIGNIFICANT CHANGE UP (ref 1–3.3)
LYMPHOCYTES # BLD AUTO: 24.1 % — SIGNIFICANT CHANGE UP (ref 13–44)
MCHC RBC-ENTMCNC: 30.2 PG — SIGNIFICANT CHANGE UP (ref 27–34)
MCHC RBC-ENTMCNC: 32.5 GM/DL — SIGNIFICANT CHANGE UP (ref 32–36)
MCV RBC AUTO: 92.8 FL — SIGNIFICANT CHANGE UP (ref 80–100)
MONOCYTES # BLD AUTO: 0.72 K/UL — SIGNIFICANT CHANGE UP (ref 0–0.9)
MONOCYTES NFR BLD AUTO: 10.2 % — SIGNIFICANT CHANGE UP (ref 2–14)
NEUTROPHILS # BLD AUTO: 4.02 K/UL — SIGNIFICANT CHANGE UP (ref 1.8–7.4)
NEUTROPHILS NFR BLD AUTO: 57 % — SIGNIFICANT CHANGE UP (ref 43–77)
NRBC # BLD: 0 /100 WBCS — SIGNIFICANT CHANGE UP (ref 0–0)
PLATELET # BLD AUTO: 232 K/UL — SIGNIFICANT CHANGE UP (ref 150–400)
POTASSIUM SERPL-MCNC: 3.8 MMOL/L — SIGNIFICANT CHANGE UP (ref 3.5–5.3)
POTASSIUM SERPL-SCNC: 3.8 MMOL/L — SIGNIFICANT CHANGE UP (ref 3.5–5.3)
PROT SERPL-MCNC: 7.6 G/DL — SIGNIFICANT CHANGE UP (ref 6–8.3)
PROTHROM AB SERPL-ACNC: 16.7 SEC — HIGH (ref 10.6–13.6)
RBC # BLD: 4.87 M/UL — SIGNIFICANT CHANGE UP (ref 3.8–5.2)
RBC # FLD: 12.3 % — SIGNIFICANT CHANGE UP (ref 10.3–14.5)
SARS-COV-2 RNA SPEC QL NAA+PROBE: SIGNIFICANT CHANGE UP
SODIUM SERPL-SCNC: 142 MMOL/L — SIGNIFICANT CHANGE UP (ref 135–145)
TROPONIN T SERPL-MCNC: <0.01 NG/ML — SIGNIFICANT CHANGE UP (ref 0–0.01)
WBC # BLD: 7.05 K/UL — SIGNIFICANT CHANGE UP (ref 3.8–10.5)
WBC # FLD AUTO: 7.05 K/UL — SIGNIFICANT CHANGE UP (ref 3.8–10.5)

## 2021-05-04 PROCEDURE — 85025 COMPLETE CBC W/AUTO DIFF WBC: CPT

## 2021-05-04 PROCEDURE — 82553 CREATINE MB FRACTION: CPT

## 2021-05-04 PROCEDURE — 99285 EMERGENCY DEPT VISIT HI MDM: CPT | Mod: 25

## 2021-05-04 PROCEDURE — 93005 ELECTROCARDIOGRAM TRACING: CPT

## 2021-05-04 PROCEDURE — 71045 X-RAY EXAM CHEST 1 VIEW: CPT | Mod: 26

## 2021-05-04 PROCEDURE — 85610 PROTHROMBIN TIME: CPT

## 2021-05-04 PROCEDURE — 85730 THROMBOPLASTIN TIME PARTIAL: CPT

## 2021-05-04 PROCEDURE — 36415 COLL VENOUS BLD VENIPUNCTURE: CPT

## 2021-05-04 PROCEDURE — 80053 COMPREHEN METABOLIC PANEL: CPT

## 2021-05-04 PROCEDURE — 94640 AIRWAY INHALATION TREATMENT: CPT

## 2021-05-04 PROCEDURE — 84484 ASSAY OF TROPONIN QUANT: CPT

## 2021-05-04 PROCEDURE — 99284 EMERGENCY DEPT VISIT MOD MDM: CPT | Mod: 25

## 2021-05-04 PROCEDURE — 93010 ELECTROCARDIOGRAM REPORT: CPT

## 2021-05-04 PROCEDURE — 82550 ASSAY OF CK (CPK): CPT

## 2021-05-04 PROCEDURE — 71045 X-RAY EXAM CHEST 1 VIEW: CPT

## 2021-05-04 PROCEDURE — 96374 THER/PROPH/DIAG INJ IV PUSH: CPT

## 2021-05-04 PROCEDURE — U0003: CPT

## 2021-05-04 PROCEDURE — U0005: CPT

## 2021-05-04 RX ORDER — IPRATROPIUM/ALBUTEROL SULFATE 18-103MCG
3 AEROSOL WITH ADAPTER (GRAM) INHALATION ONCE
Refills: 0 | Status: COMPLETED | OUTPATIENT
Start: 2021-05-04 | End: 2021-05-04

## 2021-05-04 RX ADMIN — Medication 3 MILLILITER(S): at 13:07

## 2021-05-04 RX ADMIN — Medication 3 MILLILITER(S): at 13:32

## 2021-05-04 RX ADMIN — Medication 125 MILLIGRAM(S): at 13:07

## 2021-05-04 NOTE — ED PROVIDER NOTE - CLINICAL SUMMARY MEDICAL DECISION MAKING FREE TEXT BOX
84 yo F with hx CAD HTN prior PTCA ? stents with wheezing x 2 days    no chest pain no hx of PE - hx sarcoid - well appearing - on exam wheezing in upper lung fields noted  will rx nebs  steroids will dw  dr isabel 86 yo F with hx CAD HTN prior PTCA ? stents with wheezing x 2 days    no chest pain no hx of PE - hx sarcoid - well appearing - on exam wheezing in upper lung fields noted  will rx nebs  steroids will dw  dr isabel  pt improved  no resp distress  dw  dr isabel  will dc on po steroids  CXR neg  no gross susp for PE/ DVT

## 2021-05-04 NOTE — ED PROVIDER NOTE - CARE PLAN
Principal Discharge DX:	Sarcoid  Secondary Diagnosis:	Wheezing  Secondary Diagnosis:	Presence of cardiac pacemaker  Secondary Diagnosis:	CAD (coronary artery disease)

## 2021-05-04 NOTE — ED PROVIDER NOTE - GENITOURINARY NEGATIVE STATEMENT, MLM
CHIEF COMPLAINT: Fever, chills





PCP: Dr. Nhan Terrazas





Urologist: Dr. Dominguez





HISTORY OF PRESENT ILLNESS:


87 year-old male with a significant PMH of HTN, HLD, CAD s/p stent placement, 

aortic stenosis s/p TAVR, COPD, BPH, urinary retention, recurrent UTIs, renal 

cell cancer s/p right nephrectomy, and GERD. Patient presented to the ED with a 

complaint of chills x 3-4 days. When this has occurred in the past, he has had 

a UTI. Denies dysuria, frequency, urgency, hematuria.





ER course was notable for:


(1) WBC 3.9, T 102.1, p 92, lactic acid 4.6, +pyuria


(2) Given Zosyn x 1 dose, developed splotchy erythema of face and neck, 

benadryl administered with resolution of symptoms





Recent Travel:


No





PAST MEDICAL HISTORY:


Hypertension


Hyperlipidemia


Coronary artery disease


Aortic stenosis


COPD


BPH


Urinary retention/recurrent UTIs


Renal cell carcinoma


GERD





PAST SURGICAL HISTORY:


Ureteral stricture dilitations


Right nephrectomy





Social History: lives with wife in house, still drives, independent


Smoking: never


Alcohol: no


Drugs: no





Family History:


Allergies





piperacillin [From Zosyn] Allergy (Intermediate, Verified 05/29/19 13:17)


 Rash


tazobactam [From Zosyn] Allergy (Intermediate, Verified 05/29/19 13:17)


 Rash








HOME MEDICATIONS:


 Home Medications











 Medication  Instructions  Recorded


 


Atorvastatin Ca [Lipitor] 40 mg PO HS 05/29/19


 


Finasteride 5 mg PO DAILY 05/29/19


 


Fluticasone Propionate [Flovent 50 mcg IH PRN 05/29/19





Diskus]  


 


Montelukast Na [Singulair -] 10 mg PO HS 05/29/19


 


Pantoprazole Sodium 40 mg PO DAILY 05/29/19


 


Tamsulosin HCl 0.4 mg PO DAILY 05/29/19


 


Tiotropium Bromide [Spiriva] 1 inh PO DAILY 05/29/19








REVIEW OF SYSTEMS


CONSTITUTIONAL: 


+chills


Absent:  fever, chills, diaphoresis, generalized weakness, malaise, loss of 

appetite, weight change


HEENT: 


Absent:  rhinorrhea, nasal congestion, throat pain, throat swelling, difficulty 

swallowing, mouth swelling, ear pain, eye pain, visual changes


CARDIOVASCULAR: 


Absent: chest pain, syncope, palpitations, irregular heart rate, lightheadedness

, peripheral edema


RESPIRATORY: 


Absent: cough, shortness of breath, dyspnea with exertion, orthopnea, wheezing, 

stridor, hemoptysis


GASTROINTESTINAL:


Absent: abdominal pain, abdominal distension, nausea, vomiting, diarrhea, 

constipation, melena, hematochezia


GENITOURINARY: 


Absent: dysuria, frequency, urgency, hesitancy, hematuria, flank pain, genital 

pain


MUSCULOSKELETAL: 


Absent: myalgia, arthralgia, joint swelling, back pain, neck pain


SKIN: 


Absent: rash, itching, pallor


HEMATOLOGIC/IMMUNOLOGIC: 


Absent: easy bleeding, easy bruising, lymphadenopathy, frequent infections


ENDOCRINE:


Absent: unexplained weight gain, unexplained weight loss, heat intolerance, 

cold intolerance


NEUROLOGIC: 


Absent: headache, focal weakness or paresthesias, dizziness, unsteady gait, 

seizure, mental status changes, bladder or bowel incontinence


PSYCHIATRIC: 


Absent: anxiety, depression, suicidal or homicidal ideation, hallucinations.








PHYSICAL EXAMINATION


 Vital Signs - 24 hr











  05/29/19 05/29/19 05/29/19





  09:21 10:05 10:30


 


Temperature 102.1 F H  


 


Pulse Rate 92 H  


 


Pulse Rate [  88 





Apical]   


 


Respiratory 20 20 





Rate   


 


Blood Pressure 135/57 L  


 


Blood Pressure  83/51 L 92/78





[Right Arm]   


 


O2 Sat by Pulse 96  





Oximetry (%)   














  05/29/19 05/29/19 05/29/19





  11:02 12:11 12:24


 


Temperature 101.1 F H  100.2 F H


 


Pulse Rate   


 


Pulse Rate [ 78 78 





Apical]   


 


Respiratory 20 18 





Rate   


 


Blood Pressure   


 


Blood Pressure 99/62 97/55 L 





[Right Arm]   


 


O2 Sat by Pulse  94 L 





Oximetry (%)   














  05/29/19 05/29/19





  12:53 13:15


 


Temperature  


 


Pulse Rate  


 


Pulse Rate [ 72 78





Apical]  


 


Respiratory 18 20





Rate  


 


Blood Pressure  


 


Blood Pressure 105/56 L 108/64





[Right Arm]  


 


O2 Sat by Pulse  95





Oximetry (%)  











GENERAL: Awake, alert, and fully oriented, in no acute distress.


LUNGS: Breath sounds equal, clear to auscultation bilaterally. No wheezes, and 

no crackles. No accessory muscle use.


HEART: Regular rate and rhythm, S1 and S2 


ABDOMEN: Soft, nontender, not distended


UPPER EXTREMITIES: 2+ pulses, warm, well-perfused. No cyanosis. No clubbing. No 

peripheral edema.


LOWER EXTREMITIES: 2+ pulses, warm, well-perfused. No calf tenderness. No 

peripheral edema. 


NEUROLOGICAL:  Cranial nerves II-XII intact. Normal speech.





                           Laboratory Results - last 24 hr











  05/29/19 05/29/19 05/29/19





  09:50 09:50 09:50


 


WBC  3.9 L  


 


RBC  3.33 L  


 


Hgb  10.3 L  


 


Hct  31.6 L  


 


MCV  94.9  


 


MCH  30.9  


 


MCHC  32.6  


 


RDW  15.1  


 


Absolute Neuts (auto)  3.7  


 


Neutrophils %  92.8 H D  


 


Lymphocytes %  5.2 L D  


 


Monocytes %  0.7 L D  


 


Eosinophils %  0.7  D  


 


Basophils %  0.6  


 


Nucleated RBC %  0  


 


Sodium   140 


 


Potassium   4.6 


 


Chloride   107 


 


Carbon Dioxide   19 L 


 


Anion Gap   14 


 


BUN   35 H 


 


Creatinine   1.8 H 


 


Est GFR (CKD-EPI)AfAm   37.83 


 


Est GFR (CKD-EPI)NonAf   32.64 


 


Random Glucose   150 H 


 


Lactic Acid   


 


Calcium   8.7 


 


Total Bilirubin   1.4 H 


 


AST   28 


 


ALT   14 


 


Alkaline Phosphatase   78 


 


Creatine Kinase   38 


 


Troponin I    0.04


 


Total Protein   5.8 L 


 


Albumin   3.1 L 


 


Urine Color   


 


Urine Appearance   


 


Urine pH   


 


Urine Protein   


 


Urine Glucose (UA)   


 


Urine Ketones   


 


Urine Blood   


 


Urine Nitrite   


 


Urine Bilirubin   


 


Urine Urobilinogen   


 


Ur Leukocyte Esterase   


 


Urine RBC   


 


Urine WBC   


 


Ur Transition Epith Cell   


 


Urine Bacteria   














  05/29/19 05/29/19





  09:50 11:03


 


WBC  


 


RBC  


 


Hgb  


 


Hct  


 


MCV  


 


MCH  


 


MCHC  


 


RDW  


 


Absolute Neuts (auto)  


 


Neutrophils %  


 


Lymphocytes %  


 


Monocytes %  


 


Eosinophils %  


 


Basophils %  


 


Nucleated RBC %  


 


Sodium  


 


Potassium  


 


Chloride  


 


Carbon Dioxide  


 


Anion Gap  


 


BUN  


 


Creatinine  


 


Est GFR (CKD-EPI)AfAm  


 


Est GFR (CKD-EPI)NonAf  


 


Random Glucose  


 


Lactic Acid  4.6 H* 


 


Calcium  


 


Total Bilirubin  


 


AST  


 


ALT  


 


Alkaline Phosphatase  


 


Creatine Kinase  


 


Troponin I  


 


Total Protein  


 


Albumin  


 


Urine Color   Yellow


 


Urine Appearance   Clear


 


Urine pH   5.0


 


Urine Protein   1+ H


 


Urine Glucose (UA)   Negative


 


Urine Ketones   Trace


 


Urine Blood   2+ H


 


Urine Nitrite   Positive


 


Urine Bilirubin   1+ H


 


Urine Urobilinogen   1.0


 


Ur Leukocyte Esterase   1+


 


Urine RBC   5-10


 


Urine WBC   


 


Ur Transition Epith Cell   2+


 


Urine Bacteria   4+











ASSESSMENT/PLAN


87 year-old male with a significant PMH of HTN, HLD, CAD s/p stent placement, 

aortic stenosis s/p TAVR, COPD, BPH, urinary retention, recurrent UTIs, renal 

cell cancer s/p right nephrectomy, and GERD. Admitted for severe sepsis 

secondary to UTI.





Severe sepsis secondary to UTI


   --on admission, WBC 3.9, T 102.1, p 92, lactic acid 4.6, +pyuria


   --rash reaction to Zosyn in ED


   --switched to meropenem


   --IV fluids given, repeat lactic acid wnl


   --ID to follow





Hypertension


   --BP stable, not on antihypertensives





Hyperlipidemia


   --continue Lipitor





Coronary artery disease


   --not on daily ASA, beta blocker





Aortic stenosis s/p TAVR


   --cautious IV fluids





COPD


   --stable


   --continue Spiriva





BPH


Urinary retention


   --continue finasteride





Renal cell carcinoma s/p right nephrectomy





GERD


   --protonix





FEN


   Fluids: PO intake adequate


   Electrolytes: replete as indicated


   Nutrition: low sodium





DVT prophylaxis: subq heparin











Visit type





- Emergency Visit


Emergency Visit: Yes


ED Registration Date: 05/29/19


Care time: The patient presented to the Emergency Department on the above date 

and was hospitalized for further evaluation of their emergent condition.





- New Patient


This patient is new to me today: Yes


Date on this admission: 05/30/19





- Critical Care


Critical Care patient: No no dysuria, no frequency, and no hematuria.

## 2021-05-04 NOTE — ED ADULT NURSE NOTE - OBJECTIVE STATEMENT
Pt with complaint of worsening shortness of breath, especially when lying flat since March. Pt has a history of sarcoidosis. Lung sounds noted with expiratory wheezes bilaterally. Able to speak in full and complete sentences Duonebs given as ordered and solumedrol. Pt spo2 100% on rooma air.

## 2021-05-04 NOTE — ED PROVIDER NOTE - CARE PROVIDER_API CALL
Leah Riggs)  Critical Care Medicine; Pulmonary Disease  [address] [place] [address]  Phone: [phone]  Fax: [phone]  Follow Up Time: 1-3 Days

## 2021-05-04 NOTE — ED PROVIDER NOTE - PATIENT PORTAL LINK FT
You can access the FollowMyHealth Patient Portal offered by [facility] by registering at the following website: [email or web address]. By joining [facility]’s FollowMyHealth portal, you will also be able to view your health information using other applications (apps) compatible with our system.

## 2021-05-04 NOTE — ED PROVIDER NOTE - OBJECTIVE STATEMENT
86 yo F with hx CAD  HTN prior TIA  PPM recently finished course of steroids 1 month ago- now with recurrent wheezing and sob  x 2 days -- no cough or sputum production- no leg edema or calf tenderness -- no pleuritic CP - no hx of DVT or PE  non smoker  no sob with exertion   no hx of Covid- pt has been vaccinated against Covid in February

## 2021-05-04 NOTE — ED PROVIDER NOTE - CPE EDP HEME LYMPH NORM
Date: 6/10/2025    Patient Name: Kemar Gregory          To Whom it may concern:    The above patient was seen at [facility] for treatment of a medical condition.      Has medical condition that requires medication, additional assistance, and more time to finish testing and homework.  May need secluded area for exam.        Sincerely,    Dr. Teresa Mccloud, DO    
normal...

## 2021-05-04 NOTE — ED ADULT NURSE NOTE - NSIMPLEMENTINTERV_GEN_ALL_ED
Implemented All Fall with Harm Risk Interventions:  [place] to call system. Call bell, personal items and telephone within reach. Instruct patient to call for assistance. Room bathroom lighting operational. Non-slip footwear when patient is off stretcher. Physically safe environment: no spills, clutter or unnecessary equipment. Stretcher in lowest position, wheels locked, appropriate side rails in place. Provide visual cue, wrist band, yellow gown, etc. Monitor gait and stability. Monitor for mental status changes and reorient to person, place, and time. Review medications for side effects contributing to fall risk. Reinforce activity limits and safety measures with patient and family. Provide visual clues: red socks.

## 2021-05-04 NOTE — ED ADULT TRIAGE NOTE - CHIEF COMPLAINT QUOTE
Pt presents reporting wheezing since march. Pt reports hx of sarcoidosis, takes PRN inhaler with mixed relief. Pt presents speaking in full sentences.

## 2021-05-10 ENCOUNTER — APPOINTMENT (OUTPATIENT)
Dept: PULMONOLOGY | Facility: CLINIC | Age: 86
End: 2021-05-10
Payer: MEDICARE

## 2021-05-10 VITALS
RESPIRATION RATE: 12 BRPM | WEIGHT: 160 LBS | TEMPERATURE: 98.1 F | HEART RATE: 93 BPM | HEIGHT: 60 IN | OXYGEN SATURATION: 96 % | DIASTOLIC BLOOD PRESSURE: 83 MMHG | SYSTOLIC BLOOD PRESSURE: 167 MMHG | BODY MASS INDEX: 31.41 KG/M2

## 2021-05-10 PROCEDURE — 99214 OFFICE O/P EST MOD 30 MIN: CPT

## 2021-05-10 PROCEDURE — 99072 ADDL SUPL MATRL&STAF TM PHE: CPT

## 2021-05-10 RX ORDER — PREDNISONE 20 MG/1
20 TABLET ORAL
Qty: 8 | Refills: 0 | Status: DISCONTINUED | COMMUNITY
Start: 2021-03-16 | End: 2021-05-10

## 2021-05-10 RX ORDER — AZITHROMYCIN 250 MG/1
250 TABLET, FILM COATED ORAL
Qty: 1 | Refills: 0 | Status: DISCONTINUED | COMMUNITY
Start: 2021-03-08 | End: 2021-05-10

## 2021-05-10 RX ORDER — FLUTICASONE PROPIONATE AND SALMETEROL 500; 50 UG/1; UG/1
500-50 POWDER RESPIRATORY (INHALATION) TWICE DAILY
Qty: 1 | Refills: 6 | Status: DISCONTINUED | COMMUNITY
Start: 2017-10-22 | End: 2021-05-10

## 2021-05-11 NOTE — ASSESSMENT
[FreeTextEntry1] : Bronchial asthma\par \par The patient had an episode of exacerbation most likely related to the viral vaccination. she has finished steroids.  Pt is not taking Advair 500/51 puff twice daily.  Discussed with pt she needs to take a ICS/LABA  Started pt on Symbicort to use with AeroChamber.  No need systemic steroid at this point.  Pt given nebulizer with albuterol to use when very tight and wheezing and to carry albuterol inhaler to use with AeroChamber when out.\par \par

## 2021-05-11 NOTE — PHYSICAL EXAM
[No Acute Distress] : no acute distress [Well Nourished] : well nourished [Well Developed] : well developed [Well-Appearing] : well-appearing [Normal Sclera/Conjunctiva] : normal sclera/conjunctiva [PERRL] : pupils equal round and reactive to light [EOMI] : extraocular movements intact [Normal Outer Ear/Nose] : the outer ears and nose were normal in appearance [Normal Oropharynx] : the oropharynx was normal [No JVD] : no jugular venous distention [No Lymphadenopathy] : no lymphadenopathy [Supple] : supple [Thyroid Normal, No Nodules] : the thyroid was normal and there were no nodules present [No Respiratory Distress] : no respiratory distress  [No Accessory Muscle Use] : no accessory muscle use [Normal Rate] : normal rate  [Regular Rhythm] : with a regular rhythm [Normal S1, S2] : normal S1 and S2 [No Murmur] : no murmur heard [No Carotid Bruits] : no carotid bruits [No Abdominal Bruit] : a ~M bruit was not heard ~T in the abdomen [No Varicosities] : no varicosities [Pedal Pulses Present] : the pedal pulses are present [No Edema] : there was no peripheral edema [No Palpable Aorta] : no palpable aorta [No Extremity Clubbing/Cyanosis] : no extremity clubbing/cyanosis [Soft] : abdomen soft [Non Tender] : non-tender [Non-distended] : non-distended [No Masses] : no abdominal mass palpated [No HSM] : no HSM [Normal Bowel Sounds] : normal bowel sounds [Normal Posterior Cervical Nodes] : no posterior cervical lymphadenopathy [Normal Anterior Cervical Nodes] : no anterior cervical lymphadenopathy [No CVA Tenderness] : no CVA  tenderness [No Spinal Tenderness] : no spinal tenderness [No Joint Swelling] : no joint swelling [Grossly Normal Strength/Tone] : grossly normal strength/tone [No Rash] : no rash [Coordination Grossly Intact] : coordination grossly intact [No Focal Deficits] : no focal deficits [Normal Gait] : normal gait [Deep Tendon Reflexes (DTR)] : deep tendon reflexes were 2+ and symmetric [Normal Affect] : the affect was normal [Normal Insight/Judgement] : insight and judgment were intact [de-identified] : bl wheeze

## 2021-05-11 NOTE — HISTORY OF PRESENT ILLNESS
[TextBox_4] : Patient was recently in the ER provided IV steroids and albuterol nebulizer for asthma exacerbation. She was sent home with prednisone and is currently off steroids.  She states her wheezing all started after the COVID vaccine.  She is feeling better and her wheezing has improved.  Denies coughing, fevers or increased SOB.   She is using her albuterol inhaler less and has not been taking her LABA/ICS.\par  [FreeTextEntry1] : she is worse after the covid vaccine [de-identified] : Patient was doing well in [place].  She had a new hearing aid but she started complaining of pain on the left side of the body from the leg up to the chest and she feels that she had a heart attack.  She was told that the hearing aid will cause had a.  She also complaining of worsening of her asthma after she took the first dose of the Covid.  She is wheezing.  She using the inhaler about 3 times a day.  She lost 3 pounds since she came back from [place].  She is compliant with her diet

## 2021-05-11 NOTE — DISCUSSION/SUMMARY
[FreeTextEntry1] : EXAM: XR CHEST PORTABLE IMMED 1V\par \par PROCEDURE DATE: 05/04/2021\par \par \par \par INTERPRETATION: Chest, single portable view\par \par HISTORY: Wheezing dyspnea\par \par Comparison: None\par \par IMPRESSION:\par \par Support Devices: Left-sided double lead pacemaker with right atrial and right ventricular leads.\par Heart: Unremarkable\par Mediastinum: Unremarkable\par Lungs/Airways: Unremarkable\par Bones/Soft tissues: Unremarkable

## 2021-06-15 NOTE — ED ADULT NURSE NOTE - CAS EDN DISCHARGE INTERVENTIONS
A/Ox4 - forgetful at times, can be OOB via lift to W/C, voiding via urinary catheter, scheduled and PRN Oxycodone given for pain, total cares/total feed, turned/repositioned throughout shift, wound cares done by WOC RN this afternoon, and patient will discharge to LTC once placement determined.   Arm band on/IV intact

## 2021-06-17 ENCOUNTER — APPOINTMENT (OUTPATIENT)
Dept: PULMONOLOGY | Facility: CLINIC | Age: 86
End: 2021-06-17
Payer: MEDICARE

## 2021-06-17 VITALS
WEIGHT: 163 LBS | HEART RATE: 92 BPM | HEIGHT: 60 IN | BODY MASS INDEX: 32 KG/M2 | DIASTOLIC BLOOD PRESSURE: 83 MMHG | OXYGEN SATURATION: 92 % | TEMPERATURE: 98 F | SYSTOLIC BLOOD PRESSURE: 193 MMHG

## 2021-06-17 DIAGNOSIS — H91.90 UNSPECIFIED HEARING LOSS, UNSPECIFIED EAR: ICD-10-CM

## 2021-06-17 PROCEDURE — 99214 OFFICE O/P EST MOD 30 MIN: CPT

## 2021-06-17 PROCEDURE — 99072 ADDL SUPL MATRL&STAF TM PHE: CPT

## 2021-06-17 RX ORDER — INHALER, ASSIST DEVICES
SPACER (EA) MISCELLANEOUS
Qty: 1 | Refills: 0 | Status: ACTIVE | COMMUNITY
Start: 2021-05-10 | End: 1900-01-01

## 2021-06-17 RX ORDER — SOFT LENS DISINFECTANT
SOLUTION, NON-ORAL MISCELLANEOUS
Qty: 1 | Refills: 0 | Status: ACTIVE | COMMUNITY
Start: 2021-05-10 | End: 1900-01-01

## 2021-06-17 NOTE — PHYSICAL EXAM
[No Acute Distress] : no acute distress [Well Nourished] : well nourished [Well Developed] : well developed [Well-Appearing] : well-appearing [Normal Sclera/Conjunctiva] : normal sclera/conjunctiva [PERRL] : pupils equal round and reactive to light [EOMI] : extraocular movements intact [Normal Outer Ear/Nose] : the outer ears and nose were normal in appearance [Normal Oropharynx] : the oropharynx was normal [No JVD] : no jugular venous distention [No Lymphadenopathy] : no lymphadenopathy [Supple] : supple [Thyroid Normal, No Nodules] : the thyroid was normal and there were no nodules present [No Respiratory Distress] : no respiratory distress  [No Accessory Muscle Use] : no accessory muscle use [Normal Rate] : normal rate  [Regular Rhythm] : with a regular rhythm [Normal S1, S2] : normal S1 and S2 [No Murmur] : no murmur heard [No Carotid Bruits] : no carotid bruits [No Abdominal Bruit] : a ~M bruit was not heard ~T in the abdomen [No Varicosities] : no varicosities [Pedal Pulses Present] : the pedal pulses are present [No Edema] : there was no peripheral edema [No Palpable Aorta] : no palpable aorta [No Extremity Clubbing/Cyanosis] : no extremity clubbing/cyanosis [Soft] : abdomen soft [Non Tender] : non-tender [Non-distended] : non-distended [No Masses] : no abdominal mass palpated [No HSM] : no HSM [Normal Bowel Sounds] : normal bowel sounds [Normal Posterior Cervical Nodes] : no posterior cervical lymphadenopathy [Normal Anterior Cervical Nodes] : no anterior cervical lymphadenopathy [No CVA Tenderness] : no CVA  tenderness [No Spinal Tenderness] : no spinal tenderness [No Joint Swelling] : no joint swelling [Grossly Normal Strength/Tone] : grossly normal strength/tone [No Rash] : no rash [Coordination Grossly Intact] : coordination grossly intact [No Focal Deficits] : no focal deficits [Normal Gait] : normal gait [Normal Affect] : the affect was normal [Deep Tendon Reflexes (DTR)] : deep tendon reflexes were 2+ and symmetric [Normal Insight/Judgement] : insight and judgment were intact [de-identified] : bl wheeze

## 2021-06-17 NOTE — HISTORY OF PRESENT ILLNESS
[TextBox_4] : Patient was not  been in the ER and rarely using the albuterol.   Her asthma is better and she is well controlled. She is off steroids.  She states her wheezing all started after the COVID vaccine but she is feeling better and rarely wheezing.  She is feeling better and her wheezing has improved.  Denies coughing, fevers or increased SOB.  She is taking her LABA/ICS BID.\par

## 2021-06-17 NOTE — ASSESSMENT
[FreeTextEntry1] : Bronchial asthma\par \par The patient had an episode of exacerbation most likely related to the viral vaccination. She is off steroids and taking ICS/LABA BID.  No need systemic steroid at this point.  Pt given nebulizer with albuterol to use when very tight and wheezing and to carry albuterol inhaler to use with AeroChamber when out.  She has improved since last visit and stable.\par \par BP elevated today\par \par Pt is compliant with BP meds and will follow with repeat BP readings at home and schedule telemed.  She is scheduled for an echo.\par \par Acquired hypothyroidism\par \par The patient is to continue on Synthroid and will follow up on the TSH level next visit.\par \par Coronary artery disease\par \par Patient will follow up with another cardiologist on referral from her friend of hers. The EKG was unchanged and no evidence of cardiac ischemia. The patient is to follow-up with cardiology at this point and going for echo.\par \par Atrial fibrillation currently rate is controlled and she is on Pradaxa\par \par CVA\par \par The patient did not have any recent neurological deficit. The patient is on antiplatelet and and Pradaxa\par \par Sarcoidosis\par \par Stable\par \par Health Maintenance\par \par - up to date with vaccine (COVID)\par - mammogram and US of breast last done 4/2/2021 \par - Xray 4/2/2021 \par - Referred to ENT\par \par \par  \par

## 2021-06-17 NOTE — HEALTH RISK ASSESSMENT
[No] : No [No falls in past year] : Patient reported no falls in the past year [0] : 2) Feeling down, depressed, or hopeless: Not at all (0) [1 or 2 (0 pts)] : 1 or 2 (0 points) [Never (0 pts)] : Never (0 points) [] : No

## 2021-06-28 ENCOUNTER — APPOINTMENT (OUTPATIENT)
Dept: PULMONOLOGY | Facility: CLINIC | Age: 86
End: 2021-06-28

## 2021-06-30 ENCOUNTER — NON-APPOINTMENT (OUTPATIENT)
Age: 86
End: 2021-06-30

## 2021-07-20 ENCOUNTER — OUTPATIENT (OUTPATIENT)
Dept: OUTPATIENT SERVICES | Facility: HOSPITAL | Age: 86
LOS: 1 days | End: 2021-07-20
Payer: COMMERCIAL

## 2021-07-20 DIAGNOSIS — I25.9 CHRONIC ISCHEMIC HEART DISEASE, UNSPECIFIED: ICD-10-CM

## 2021-07-20 PROCEDURE — 93306 TTE W/DOPPLER COMPLETE: CPT

## 2021-07-20 PROCEDURE — 93306 TTE W/DOPPLER COMPLETE: CPT | Mod: 26

## 2021-09-16 ENCOUNTER — LABORATORY RESULT (OUTPATIENT)
Age: 86
End: 2021-09-16

## 2021-09-16 ENCOUNTER — APPOINTMENT (OUTPATIENT)
Dept: PULMONOLOGY | Facility: CLINIC | Age: 86
End: 2021-09-16
Payer: MEDICARE

## 2021-09-16 VITALS
TEMPERATURE: 97.2 F | BODY MASS INDEX: 31.41 KG/M2 | RESPIRATION RATE: 12 BRPM | WEIGHT: 160 LBS | HEIGHT: 60 IN | DIASTOLIC BLOOD PRESSURE: 77 MMHG | SYSTOLIC BLOOD PRESSURE: 131 MMHG | HEART RATE: 85 BPM | OXYGEN SATURATION: 96 %

## 2021-09-16 PROCEDURE — 99214 OFFICE O/P EST MOD 30 MIN: CPT | Mod: 25

## 2021-09-16 PROCEDURE — 36415 COLL VENOUS BLD VENIPUNCTURE: CPT

## 2021-09-16 NOTE — HISTORY OF PRESENT ILLNESS
[FreeTextEntry1] : she is doing OK [de-identified] : Her only concern is the blood sugar.  She does not measure her BS.  She wants to have the booster shot.  She is only sob if she goes up few flights of steps.  She is getting PT for her gait

## 2021-09-16 NOTE — END OF VISIT
[FreeTextEntry3] : Pt seen with LISA albert and agree on the above plan of care.  [Time Spent: ___ minutes] : I have spent [unfilled] minutes of time on the encounter.

## 2021-09-16 NOTE — HEALTH RISK ASSESSMENT
[No] : No [1 or 2 (0 pts)] : 1 or 2 (0 points) [Never (0 pts)] : Never (0 points) [No falls in past year] : Patient reported no falls in the past year [0] : 2) Feeling down, depressed, or hopeless: Not at all (0) [] : No

## 2021-09-16 NOTE — ASSESSMENT
[FreeTextEntry1] : Bronchial asthma\par Bronchial asthma stable.  She is does not require any rescue dose of inhaler.  No bronchospasm.  No indication for systemic steroids.  Continue the current bronchodilators\par \par \par HTN\par \par The blood pressure control and should continue on her current meds.  She lost 3 pounds and she is compliant with diet\par \par Acquired hypothyroidism\par \par The patient is to continue on Synthroid and will follow up on the TSH level next visit.\par \par Coronary artery disease\par \par Patient will follow up with another cardiologist on referral from her friend of hers. The EKG was unchanged and no evidence of cardiac ischemia. The patient is to follow-up with cardiology at this point and going for echo.\par \par Atrial fibrillation currently rate is controlled and she is on Pradaxa\par \par CVA\par \par The patient did not have any recent neurological deficit. The patient is on antiplatelet and and Pradaxa.  She improved with PT\par \par Sarcoidosis\par \par Stable\par \par Health Maintenance\par \par - up to date with vaccine (COVID)\par \par Follow on labs\par - mammogram and US of breast last done 4/2/2021 \par - Xray 4/2/2021 \par - Referred to ENT\par \par \par  \par

## 2021-09-16 NOTE — PHYSICAL EXAM
[No Acute Distress] : no acute distress [Well Nourished] : well nourished [Well Developed] : well developed [Well-Appearing] : well-appearing [Normal Sclera/Conjunctiva] : normal sclera/conjunctiva [PERRL] : pupils equal round and reactive to light [EOMI] : extraocular movements intact [Normal Outer Ear/Nose] : the outer ears and nose were normal in appearance [Normal Oropharynx] : the oropharynx was normal [No JVD] : no jugular venous distention [No Lymphadenopathy] : no lymphadenopathy [Supple] : supple [Thyroid Normal, No Nodules] : the thyroid was normal and there were no nodules present [No Respiratory Distress] : no respiratory distress  [No Accessory Muscle Use] : no accessory muscle use [Normal Rate] : normal rate  [Regular Rhythm] : with a regular rhythm [Normal S1, S2] : normal S1 and S2 [No Murmur] : no murmur heard [No Carotid Bruits] : no carotid bruits [No Abdominal Bruit] : a ~M bruit was not heard ~T in the abdomen [No Varicosities] : no varicosities [Pedal Pulses Present] : the pedal pulses are present [No Edema] : there was no peripheral edema [No Palpable Aorta] : no palpable aorta [No Extremity Clubbing/Cyanosis] : no extremity clubbing/cyanosis [Soft] : abdomen soft [Non Tender] : non-tender [Non-distended] : non-distended [No Masses] : no abdominal mass palpated [No HSM] : no HSM [Normal Bowel Sounds] : normal bowel sounds [Normal Posterior Cervical Nodes] : no posterior cervical lymphadenopathy [Normal Anterior Cervical Nodes] : no anterior cervical lymphadenopathy [No CVA Tenderness] : no CVA  tenderness [No Spinal Tenderness] : no spinal tenderness [No Joint Swelling] : no joint swelling [Grossly Normal Strength/Tone] : grossly normal strength/tone [No Rash] : no rash [Coordination Grossly Intact] : coordination grossly intact [No Focal Deficits] : no focal deficits [Normal Gait] : normal gait [Deep Tendon Reflexes (DTR)] : deep tendon reflexes were 2+ and symmetric [Normal Affect] : the affect was normal [Normal Insight/Judgement] : insight and judgment were intact [de-identified] : bl wheeze

## 2021-09-19 LAB
25(OH)D3 SERPL-MCNC: 29 NG/ML
ALBUMIN SERPL ELPH-MCNC: 4.3 G/DL
ALP BLD-CCNC: 83 U/L
ALT SERPL-CCNC: 15 U/L
ANION GAP SERPL CALC-SCNC: 14 MMOL/L
APPEARANCE: CLEAR
AST SERPL-CCNC: 21 U/L
BASOPHILS # BLD AUTO: 0.06 K/UL
BASOPHILS NFR BLD AUTO: 0.8 %
BILIRUB SERPL-MCNC: 0.4 MG/DL
BILIRUBIN URINE: NEGATIVE
BLOOD URINE: NORMAL
BUN SERPL-MCNC: 15 MG/DL
CALCIUM SERPL-MCNC: 9.3 MG/DL
CHLORIDE SERPL-SCNC: 105 MMOL/L
CHOLEST SERPL-MCNC: 205 MG/DL
CO2 SERPL-SCNC: 23 MMOL/L
COLOR: YELLOW
CREAT SERPL-MCNC: 1.13 MG/DL
EOSINOPHIL # BLD AUTO: 0.19 K/UL
EOSINOPHIL NFR BLD AUTO: 2.6 %
ESTIMATED AVERAGE GLUCOSE: 126 MG/DL
GLUCOSE QUALITATIVE U: NEGATIVE
GLUCOSE SERPL-MCNC: 135 MG/DL
HBA1C MFR BLD HPLC: 6 %
HCT VFR BLD CALC: 44.2 %
HDLC SERPL-MCNC: 51 MG/DL
HGB BLD-MCNC: 14.1 G/DL
IMM GRANULOCYTES NFR BLD AUTO: 0.1 %
KETONES URINE: NEGATIVE
LDLC SERPL CALC-MCNC: 111 MG/DL
LEUKOCYTE ESTERASE URINE: ABNORMAL
LYMPHOCYTES # BLD AUTO: 1.92 K/UL
LYMPHOCYTES NFR BLD AUTO: 26.6 %
MAN DIFF?: NORMAL
MCHC RBC-ENTMCNC: 31.1 PG
MCHC RBC-ENTMCNC: 31.9 GM/DL
MCV RBC AUTO: 97.6 FL
MONOCYTES # BLD AUTO: 0.75 K/UL
MONOCYTES NFR BLD AUTO: 10.4 %
NEUTROPHILS # BLD AUTO: 4.3 K/UL
NEUTROPHILS NFR BLD AUTO: 59.5 %
NITRITE URINE: NEGATIVE
NONHDLC SERPL-MCNC: 154 MG/DL
PH URINE: 5.5
PLATELET # BLD AUTO: 240 K/UL
POTASSIUM SERPL-SCNC: 4 MMOL/L
PROT SERPL-MCNC: 6.7 G/DL
PROTEIN URINE: NEGATIVE
PSA SERPL-MCNC: <0.01 NG/ML
RBC # BLD: 4.53 M/UL
RBC # FLD: 13 %
SODIUM SERPL-SCNC: 142 MMOL/L
SPECIFIC GRAVITY URINE: 1.02
T3 SERPL-MCNC: 87 NG/DL
T4 SERPL-MCNC: 7.5 UG/DL
TRIGL SERPL-MCNC: 219 MG/DL
TSH SERPL-ACNC: 4.29 UIU/ML
UROBILINOGEN URINE: NORMAL
WBC # FLD AUTO: 7.23 K/UL

## 2021-09-28 ENCOUNTER — APPOINTMENT (OUTPATIENT)
Dept: HEART AND VASCULAR | Facility: CLINIC | Age: 86
End: 2021-09-28
Payer: MEDICARE

## 2021-09-28 ENCOUNTER — NON-APPOINTMENT (OUTPATIENT)
Age: 86
End: 2021-09-28

## 2021-09-28 VITALS
HEIGHT: 60 IN | DIASTOLIC BLOOD PRESSURE: 80 MMHG | SYSTOLIC BLOOD PRESSURE: 162 MMHG | HEART RATE: 91 BPM | WEIGHT: 160 LBS | BODY MASS INDEX: 31.41 KG/M2

## 2021-09-28 PROCEDURE — 93280 PM DEVICE PROGR EVAL DUAL: CPT

## 2021-09-28 PROCEDURE — 99214 OFFICE O/P EST MOD 30 MIN: CPT | Mod: 25

## 2021-09-28 NOTE — PROCEDURE
[No] : not [Sinus Bradycardia] : sinus bradycardia [Pacemaker] : pacemaker [Voltage: ___ volts] : Voltage was [unfilled] volts [Lead Imp:  ___ohms] : lead impedance was [unfilled] ohms [Sensing Amplitude ___mv] : sensing amplitude was [unfilled] mv [___V @] : [unfilled] V [___ ms] : [unfilled] ms [None] : none [de-identified] : Medtronic [de-identified] : Ana [de-identified] : 12/17/12 [de-identified] : AAIR-DDDR [de-identified] : 68-57 [de-identified] : longevity 3 y [de-identified] : SEE PACEART

## 2021-09-28 NOTE — HISTORY OF PRESENT ILLNESS
[Palpitations] : no palpitations [SOB] : no dyspnea [Dizziness] : no dizziness [Syncope] : no syncope [Chest Pain] : no chest pain or discomfort [Pain at Site] : no pain at device site [de-identified] : This is an 86 year old female with SSS S/P PPM, HTN, Afib, CAD and TIA/CVA. She was previously on Xarelto.  Records reflect an episode of gum bleeding (requiring packing) while on Xarelto, as well as possible GI ulcer and significant bruising.  Later started on Eliquis and reported significant bruising.  Eliquis was switched to Pradaxa and she continues to feel the bruising has been less on this agent.  Sees neurology and GI (Dr. Gutierrez).  Sees Dr. Fatima for CAD hx stent.  H/o gait instability- evaluated by Dr. Rick,  no acute infarct/hemorrhages.  OK to continue Pradaxa.  She denies any ongoing gait disturbances.  She feels well and offers no acute complaints today.  She plans to switch cardiologists- Dr. Whitlock at [facility].

## 2022-03-03 ENCOUNTER — TRANSCRIPTION ENCOUNTER (OUTPATIENT)
Age: 87
End: 2022-03-03

## 2022-03-11 ENCOUNTER — LABORATORY RESULT (OUTPATIENT)
Age: 87
End: 2022-03-11

## 2022-03-11 DIAGNOSIS — Z00.00 ENCOUNTER FOR GENERAL ADULT MEDICAL EXAMINATION W/OUT ABNORMAL FINDINGS: ICD-10-CM

## 2022-03-14 ENCOUNTER — APPOINTMENT (OUTPATIENT)
Dept: PULMONOLOGY | Facility: CLINIC | Age: 87
End: 2022-03-14
Payer: MEDICARE

## 2022-03-14 PROCEDURE — 36415 COLL VENOUS BLD VENIPUNCTURE: CPT

## 2022-03-18 LAB
25(OH)D3 SERPL-MCNC: 46.3 NG/ML
ALBUMIN SERPL ELPH-MCNC: 4.3 G/DL
ALP BLD-CCNC: 87 U/L
ALT SERPL-CCNC: 9 U/L
ANION GAP SERPL CALC-SCNC: 15 MMOL/L
APPEARANCE: CLEAR
AST SERPL-CCNC: 14 U/L
BASOPHILS # BLD AUTO: 0.08 K/UL
BASOPHILS NFR BLD AUTO: 1 %
BILIRUB SERPL-MCNC: 0.4 MG/DL
BILIRUBIN URINE: NEGATIVE
BLOOD URINE: NORMAL
BUN SERPL-MCNC: 16 MG/DL
CALCIUM SERPL-MCNC: 9 MG/DL
CHLORIDE SERPL-SCNC: 104 MMOL/L
CHOLEST SERPL-MCNC: 196 MG/DL
CO2 SERPL-SCNC: 22 MMOL/L
COLOR: YELLOW
CREAT SERPL-MCNC: 1.1 MG/DL
EGFR: 49 ML/MIN/1.73M2
EOSINOPHIL # BLD AUTO: 0.44 K/UL
EOSINOPHIL NFR BLD AUTO: 5.4 %
ESTIMATED AVERAGE GLUCOSE: 120 MG/DL
GLUCOSE QUALITATIVE U: NEGATIVE
GLUCOSE SERPL-MCNC: 96 MG/DL
HBA1C MFR BLD HPLC: 5.8 %
HCT VFR BLD CALC: 47.1 %
HDLC SERPL-MCNC: 43 MG/DL
HGB BLD-MCNC: 14.5 G/DL
IMM GRANULOCYTES NFR BLD AUTO: 0.2 %
KETONES URINE: NEGATIVE
LDLC SERPL CALC-MCNC: 123 MG/DL
LEUKOCYTE ESTERASE URINE: ABNORMAL
LYMPHOCYTES # BLD AUTO: 2.43 K/UL
LYMPHOCYTES NFR BLD AUTO: 29.8 %
MAN DIFF?: NORMAL
MCHC RBC-ENTMCNC: 30.5 PG
MCHC RBC-ENTMCNC: 30.8 GM/DL
MCV RBC AUTO: 99.2 FL
MONOCYTES # BLD AUTO: 0.77 K/UL
MONOCYTES NFR BLD AUTO: 9.4 %
NEUTROPHILS # BLD AUTO: 4.41 K/UL
NEUTROPHILS NFR BLD AUTO: 54.2 %
NITRITE URINE: NEGATIVE
NONHDLC SERPL-MCNC: 153 MG/DL
PH URINE: 5.5
PLATELET # BLD AUTO: 266 K/UL
POTASSIUM SERPL-SCNC: 4.2 MMOL/L
PROT SERPL-MCNC: 6.8 G/DL
PROTEIN URINE: NORMAL
RBC # BLD: 4.75 M/UL
RBC # FLD: 13.1 %
SODIUM SERPL-SCNC: 141 MMOL/L
SPECIFIC GRAVITY URINE: 1.02
T3 SERPL-MCNC: 101 NG/DL
T4 SERPL-MCNC: 9.5 UG/DL
TRIGL SERPL-MCNC: 150 MG/DL
TSH SERPL-ACNC: 2.62 UIU/ML
UROBILINOGEN URINE: NORMAL
WBC # FLD AUTO: 8.15 K/UL

## 2022-03-23 ENCOUNTER — RX RENEWAL (OUTPATIENT)
Age: 87
End: 2022-03-23

## 2022-04-15 ENCOUNTER — RESULT REVIEW (OUTPATIENT)
Age: 87
End: 2022-04-15

## 2022-04-15 ENCOUNTER — APPOINTMENT (OUTPATIENT)
Dept: MAMMOGRAPHY | Facility: CLINIC | Age: 87
End: 2022-04-15
Payer: MEDICARE

## 2022-04-15 ENCOUNTER — OUTPATIENT (OUTPATIENT)
Dept: OUTPATIENT SERVICES | Facility: HOSPITAL | Age: 87
LOS: 1 days | End: 2022-04-15

## 2022-04-15 ENCOUNTER — TRANSCRIPTION ENCOUNTER (OUTPATIENT)
Age: 87
End: 2022-04-15

## 2022-04-15 ENCOUNTER — APPOINTMENT (OUTPATIENT)
Dept: RADIOLOGY | Facility: CLINIC | Age: 87
End: 2022-04-15
Payer: MEDICARE

## 2022-04-15 PROCEDURE — 77063 BREAST TOMOSYNTHESIS BI: CPT | Mod: 26

## 2022-04-15 PROCEDURE — 71046 X-RAY EXAM CHEST 2 VIEWS: CPT | Mod: 26

## 2022-04-15 PROCEDURE — 77067 SCR MAMMO BI INCL CAD: CPT | Mod: 26

## 2022-04-21 ENCOUNTER — RX RENEWAL (OUTPATIENT)
Age: 87
End: 2022-04-21

## 2022-04-26 ENCOUNTER — APPOINTMENT (OUTPATIENT)
Dept: PULMONOLOGY | Facility: CLINIC | Age: 87
End: 2022-04-26
Payer: MEDICARE

## 2022-04-26 ENCOUNTER — APPOINTMENT (OUTPATIENT)
Dept: HEART AND VASCULAR | Facility: CLINIC | Age: 87
End: 2022-04-26
Payer: MEDICARE

## 2022-04-26 ENCOUNTER — RESULT REVIEW (OUTPATIENT)
Age: 87
End: 2022-04-26

## 2022-04-26 VITALS
SYSTOLIC BLOOD PRESSURE: 168 MMHG | HEIGHT: 60 IN | OXYGEN SATURATION: 96 % | WEIGHT: 148 LBS | DIASTOLIC BLOOD PRESSURE: 87 MMHG | HEART RATE: 89 BPM | TEMPERATURE: 97.8 F | BODY MASS INDEX: 29.06 KG/M2

## 2022-04-26 VITALS
WEIGHT: 148 LBS | SYSTOLIC BLOOD PRESSURE: 167 MMHG | BODY MASS INDEX: 29.06 KG/M2 | HEART RATE: 88 BPM | HEIGHT: 60 IN | DIASTOLIC BLOOD PRESSURE: 76 MMHG

## 2022-04-26 PROCEDURE — 99214 OFFICE O/P EST MOD 30 MIN: CPT | Mod: 25

## 2022-04-26 PROCEDURE — 99213 OFFICE O/P EST LOW 20 MIN: CPT | Mod: 25

## 2022-04-26 PROCEDURE — 93280 PM DEVICE PROGR EVAL DUAL: CPT

## 2022-04-26 PROCEDURE — 36415 COLL VENOUS BLD VENIPUNCTURE: CPT

## 2022-04-26 NOTE — HISTORY OF PRESENT ILLNESS
[FreeTextEntry1] : she is doing well  [de-identified] : se did not take her meds this morning as she had early appointments.  She is feeling well.  She lost 14 pounds and she is off sugar.  She is complaint with her meds.  They are going to increase the metoprolol to 50 mg

## 2022-04-26 NOTE — HISTORY OF PRESENT ILLNESS
[Palpitations] : no palpitations [SOB] : no dyspnea [Syncope] : no syncope [Dizziness] : no dizziness [Chest Pain] : no chest pain or discomfort [Pain at Site] : no pain at device site [de-identified] : This is an 86 year old female with SSS S/P PPM, HTN, Afib, CAD and TIA/CVA. She was previously on Xarelto.  Records reflect an episode of gum bleeding (requiring packing) while on Xarelto, as well as possible GI ulcer and significant bruising.  Later started on Eliquis and reported significant bruising.  Eliquis was switched to Pradaxa and she continues to feel the bruising has been less on this agent.  Sees neurology and GI (Dr. Gutierrez).  Sees Dr. Fatima for CAD hx stent.  H/o gait instability- evaluated by Dr. Rick,  no acute infarct/hemorrhages.  OK to continue Pradaxa.  She denies any ongoing gait disturbances.  She feels well and offers no acute complaints today.

## 2022-04-26 NOTE — ASSESSMENT
[FreeTextEntry1] : Acquired hypothyroidism\par \par Continue Synthroid.  Follow-up on labs\par \par Atrial fibrillation\par \par Heart rate is controlled and she was seen by electrophysiologist and the metoprolol dose will be increased\par \par Coronary artery disease\par \par She is followed with Dr. Zaragoza and her cardiac status is stable with no clinical evidence neither of angina nor CHF.  Patient is on beta-blocker on anticoagulation\par \par Hypertension\par \par The blood pressure in the high normal and the beta-blocker will be increased by electrophysiologist and will monitor\par \par CVA\par \par Neuro status is stable and she is followed by neurologist at [facility].\par \par Sarcoidosis\par \par The pulmonary status is stable.  Continue on the bronchodilator.  Patient is due for chest x-ray next year.\par \par \par \par Follow-up with a hemoglobin A1c she lost 14 pounds by diet control\par \par Bronchial asthma\par \par No evidence of bronchospasm and she is to continue on the bronchodilator.

## 2022-04-26 NOTE — PROCEDURE
[No] : not [Sinus Bradycardia] : sinus bradycardia [Pacemaker] : pacemaker [Voltage: ___ volts] : Voltage was [unfilled] volts [Lead Imp:  ___ohms] : lead impedance was [unfilled] ohms [Sensing Amplitude ___mv] : sensing amplitude was [unfilled] mv [___V @] : [unfilled] V [___ ms] : [unfilled] ms [None] : none [de-identified] : ~ 50 bpm  [de-identified] : Medtronic [de-identified] : Ana [de-identified] : 12/17/12 [de-identified] : AAIR-DDDR [de-identified] : 08-83 [de-identified] : longevity 24 months  [de-identified] : AS VS 20\par AP VS 79\par

## 2022-04-28 LAB
ALBUMIN SERPL ELPH-MCNC: 4.3 G/DL
ALP BLD-CCNC: 89 U/L
ALT SERPL-CCNC: 8 U/L
ANION GAP SERPL CALC-SCNC: 16 MMOL/L
AST SERPL-CCNC: 14 U/L
BILIRUB SERPL-MCNC: 0.5 MG/DL
BUN SERPL-MCNC: 11 MG/DL
CALCIUM SERPL-MCNC: 9.3 MG/DL
CHLORIDE SERPL-SCNC: 107 MMOL/L
CO2 SERPL-SCNC: 20 MMOL/L
CREAT SERPL-MCNC: 1.11 MG/DL
EGFR: 48 ML/MIN/1.73M2
GLUCOSE SERPL-MCNC: 91 MG/DL
POTASSIUM SERPL-SCNC: 4.5 MMOL/L
PROT SERPL-MCNC: 7.1 G/DL
SODIUM SERPL-SCNC: 144 MMOL/L

## 2022-05-10 ENCOUNTER — NON-APPOINTMENT (OUTPATIENT)
Age: 87
End: 2022-05-10

## 2022-05-10 ENCOUNTER — APPOINTMENT (OUTPATIENT)
Dept: HEART AND VASCULAR | Facility: CLINIC | Age: 87
End: 2022-05-10
Payer: MEDICARE

## 2022-05-10 VITALS
SYSTOLIC BLOOD PRESSURE: 152 MMHG | DIASTOLIC BLOOD PRESSURE: 84 MMHG | TEMPERATURE: 95.7 F | OXYGEN SATURATION: 96 % | HEIGHT: 60 IN | HEART RATE: 89 BPM | WEIGHT: 149 LBS | BODY MASS INDEX: 29.25 KG/M2

## 2022-05-10 PROCEDURE — 93000 ELECTROCARDIOGRAM COMPLETE: CPT

## 2022-05-10 PROCEDURE — 99214 OFFICE O/P EST MOD 30 MIN: CPT

## 2022-05-11 NOTE — HISTORY OF PRESENT ILLNESS
[FreeTextEntry1] : 86 year old female with SSS s/p PPM, HTN, Afib on Xarelto, CAD (hx of stents), pulmonary sarcoidosis, asthma, and TIA/CVA is here to establish cardiology care. \par \par Pt sees neurology (Dr. Rick) and Pulm (Dr. Lynn). \par \par BP is elevated today but patient did not take her meds and is also anxious since she is meeting me for the first time. Pt also stopped taking her statin. \par \par No chest pain. No shortness of breath. No dyspnea on exertion. No orthopnea. No PND. \par \par \par Latest labs:\par \par \par K 4.5 \par Cr 1.1\par TSH 2.62\par A1c 5.8

## 2022-05-11 NOTE — DISCUSSION/SUMMARY
[FreeTextEntry1] : 86 year old female with SSS s/p PPM, HTN, Afib on Xarelto, CAD (hx of stents), pulmonary sarcoidosis, asthma, and TIA/CVA is here to establish cardiology care. \par \par 1. Afib c.b CVA - paroxysmal. In sinus. Cont Xarelto\par 2. CAD - hx of PCI. > 5 yrs ago. Off antiplatelet agents, on Xarelto\par 3. Hyperlipidemia -  (goal <70). patient has not been taking her statin. She will re-start Pravachol 80 mg qhs. She does not want to switch to a high intensity at this time. \par 4. HTN - BP elev. Pt did not take her meds and is also attributing the elevation to being nervous. She regularly measures it at home and says that it is much lower.Will check echo. If any evidence of LVH, will add ACEi or consider Norvasc. Will not go up on bb given hx of asthma. \par 5. Pulm Sarcoidosis and Asthma - stable, followed by Pulm - Dr. Riggs. \par   \par f/u after TTE

## 2022-05-11 NOTE — CARDIOLOGY SUMMARY
[de-identified] : 5/10/2022: NSR, non-specific diffuse TW changes [de-identified] : Hx of cath with stents

## 2022-06-09 ENCOUNTER — FORM ENCOUNTER (OUTPATIENT)
Age: 87
End: 2022-06-09

## 2022-06-10 ENCOUNTER — OUTPATIENT (OUTPATIENT)
Dept: OUTPATIENT SERVICES | Facility: HOSPITAL | Age: 87
LOS: 1 days | End: 2022-06-10
Payer: MEDICARE

## 2022-06-10 DIAGNOSIS — I63.9 CEREBRAL INFARCTION, UNSPECIFIED: ICD-10-CM

## 2022-06-10 PROCEDURE — 93306 TTE W/DOPPLER COMPLETE: CPT | Mod: 26

## 2022-06-10 PROCEDURE — 93306 TTE W/DOPPLER COMPLETE: CPT

## 2022-06-13 ENCOUNTER — NON-APPOINTMENT (OUTPATIENT)
Age: 87
End: 2022-06-13

## 2022-06-14 NOTE — PHYSICAL EXAM
[Well Developed] : well developed [Well Nourished] : well nourished [No Acute Distress] : no acute distress [Normal Conjunctiva] : normal conjunctiva [Normal Venous Pressure] : normal venous pressure [No Carotid Bruit] : no carotid bruit [Normal S1, S2] : normal S1, S2 [No Murmur] : no murmur [No Rub] : no rub [No Gallop] : no gallop [Clear Lung Fields] : clear lung fields [Good Air Entry] : good air entry [No Respiratory Distress] : no respiratory distress  [Soft] : abdomen soft [Non Tender] : non-tender [No Masses/organomegaly] : no masses/organomegaly [Normal Bowel Sounds] : normal bowel sounds [Normal Gait] : normal gait [No Cyanosis] : no cyanosis [No Clubbing] : no clubbing [No Varicosities] : no varicosities [No Rash] : no rash [No Skin Lesions] : no skin lesions [Moves all extremities] : moves all extremities [No Focal Deficits] : no focal deficits [Normal Speech] : normal speech [Alert and Oriented] : alert and oriented [Normal memory] : normal memory [de-identified] : 1+ edema b/l Advancement Flap (Single) Text: The defect edges were debeveled with a #15 scalpel blade.  Given the location of the defect and the proximity to free margins a single advancement flap was deemed most appropriate.  Using a sterile surgical marker, an appropriate advancement flap was drawn incorporating the defect and placing the expected incisions within the relaxed skin tension lines where possible.    The area thus outlined was incised deep to adipose tissue with a #15 scalpel blade.  The skin margins were undermined to an appropriate distance in all directions utilizing iris scissors.

## 2022-07-15 NOTE — PATIENT PROFILE ADULT - BRADEN FRICTION AND SHEAR
Subjective:      Patient ID: Danisha Jones is a 29 y.o. male.    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 male presents to pulmonary clinic initial evaluation related to shortness of breath.    Patient is a marathon runner and noticed significant shortness of breath with easy runs and going up 2 flights of stairs over the past about 1 year. He noticed increased heart rates from 160's with runs to 170's-180's, which is still occurring. Yesterday's run Ht rate up to 185 so stopped and walked to reduce heart rate, then continued run. He wears a heart rate monitor around in chest that links to his [employer] watch.      He is able to continue run about 3 - 5 miles despite increased heart rates and shortness of breath.   The shortness of breath does not continue the whole run, shortness of breath is while running noticed when elevated heart rate in 180's.     He takes stairs when going into buildings instead of taking elevator and over past year notices shortness of breath with climbing 2 or more flights.    Other symptom of nasal congestion and having to breath through mouth while running.     There is no cough, no wheezing, no hemoptysis, no sputum production, no weight loss, noted TB exposure, no asbestos exposure, no chest pain when shortness of breath occurs.  He has complaint of chest pain that last occurred few nights ago when laying on left side, burning type sensation under left pectoral muscle region. He changed sleeping position and burning pain resolved.     Never diagnosis with asthma. Never wheezing or cough.     Mom has asthma.     No inhalers have been tried for exertional shortness of  breath.     He has had cardiac work up for chest pain and increased heart rate concerns with running.   Neg stress and ECHO 4/2022  ECHO 4/2022 with normal bi V function,   ECG stress test neg for ischemia, HTN response noted. BP peak 215/73.   Holter with rare ectopy, AVG HR 60 bpm.    He had covid-19 in Jan 2022. He had symptoms of sore throat, body aches, rhinorrhea, dry cough lasted about 4-5 days. He feels well recovered from Covid, no residual effects. He does state he wanted chest xray today to make sure no problems from covid.     He states he has anxiety with depression.     7/15/2022 chest xray is normal. Lungs clear.     Dyspnea Characteristics:   Exertional Dyspnea   Dyspnea Duration:  Chronic over the past 1 year.   Dyspnea Severity:  BETTY 3  Dyspnea Timing:  running when heart is 180. climbing 2 or more flights of stairs  Dyspnea Contributing Factors:  anxiety   Dyspnea Associated Symptoms:  tachycardia . No wheezing. No cough. No chest pain associated.    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 NO occupational exposure to Asbestos, Silica,  Petrochemicals,  Fiber glass,  Saw Dust,  Grain Dust and  Pesticides. He would like to work as an outdoor teacher. Teaching kids about out door environments.     Avocational Exposure:   None currently.       Pet Exposures:  Dogs one inside dog. Denies allergy to Dog and  cat dander.      Previous Report Reviewed: lab reports, office notes, radiology reports and cardiology reports     Past Medical History: The following portions of the patient's history were reviewed and updated as appropriate:   He  has no past surgical history on file.  His family history includes  Anemia in his mother and sister; Asthma in his mother; Diabetes in his father; Hypertension in his father; No Known Problems in his brother and sister.  He  reports that he has never smoked. He has never used smokeless tobacco. He reports previous alcohol use. He reports that he does not use drugs.  He has a current medication list which includes the following prescription(s): albuterol and sertraline.  He has No Known Allergies..    Review of Systems   Constitutional: Negative for fever, chills, weight loss, weight gain, activity change, appetite change, fatigue and night sweats.   HENT: Negative for postnasal drip, rhinorrhea, sinus pressure, voice change and congestion.    Eyes: Negative for redness and itching.   Respiratory: Positive for shortness of breath and dyspnea on extertion. Negative for snoring, cough, sputum production, chest tightness, wheezing, orthopnea, asthma nighttime symptoms, use of rescue inhaler and somnolence.    Cardiovascular: Negative.  Negative for chest pain, palpitations and leg swelling.   Genitourinary: Negative for difficulty urinating and hematuria.   Endocrine: Negative for cold intolerance and heat intolerance.    Musculoskeletal: Negative for arthralgias, gait problem, joint swelling and myalgias.   Skin: Negative.    Gastrointestinal: Negative for nausea, vomiting, abdominal pain and acid reflux.   Neurological: Negative for dizziness, weakness, light-headedness and headaches.   Hematological: Negative for adenopathy. No excessive bruising.   Psychiatric/Behavioral: The patient is nervous/anxious.    All other systems reviewed and are negative.       Objective:   /84   Pulse (!) 59   Resp 18   Ht 6' (1.829 m)   Wt 90.5 kg (199 lb 8.3 oz)   SpO2 97%   BMI 27.06 kg/m²   Physical Exam  Vitals and nursing note reviewed.   Constitutional:       General: He is not in acute distress.     Appearance: He is well-developed. He is not ill-appearing or toxic-appearing.   HENT:       Head: Normocephalic and atraumatic.      Right Ear: External ear normal.      Left Ear: External ear normal.      Nose: Nose normal.      Mouth/Throat:      Pharynx: No oropharyngeal exudate.   Eyes:      Conjunctiva/sclera: Conjunctivae normal.   Cardiovascular:      Rate and Rhythm: Normal rate and regular rhythm.      Heart sounds: Normal heart sounds.   Pulmonary:      Effort: Pulmonary effort is normal.      Breath sounds: Normal breath sounds.   Abdominal:      Palpations: Abdomen is soft.   Musculoskeletal:      Cervical back: Normal range of motion and neck supple.   Skin:     General: Skin is warm and dry.   Neurological:      Mental Status: He is alert and oriented to person, place, and time.   Psychiatric:         Behavior: Behavior normal. Behavior is cooperative.         Thought Content: Thought content normal.         Judgment: Judgment normal.       Personal Diagnostic Review    X-Ray Chest PA And Lateral  Narrative: EXAMINATION:  XR CHEST PA AND LATERAL    CLINICAL HISTORY:  Shortness of breath    TECHNIQUE:  PA and lateral views of the chest were performed.    COMPARISON:  02/25/2022    FINDINGS:  The cardiac and mediastinal silhouettes appear within normal limits.   The lungs are clear bilaterally.  No acute osseous findings demonstrated.  Impression: No acute findings.    Electronically signed by: Jose L Gutierrez MD  Date:    07/15/2022  Time:    08:10    4/8/2022 48 hour holter  · Predominant Rhythm Sinus rhythm with heart rates varying between 39 and 150 BPM with an average of 60BPM.     Monitoring started at 8:25 AM and continued for 47 hr 59 min. The average heart rate was 60 BPM. The minimum heart  rate was 39 BPM, occurring at 12:37:58 AM D1. The maximum heart rate was 150 BPM, occurring at 6:59:27 AM D2.    4/7/2022 Cardiac stress  The EKG portion of this study is negative for ischemia.    The exercise capacity was normal.    Stress ECG: There was hypertensive blood pressure  response with stress.    Assessment:     1. SOB (shortness of breath) on exertion    2. Other form of dyspnea    3. Depression with anxiety      Orders Placed This Encounter   Procedures    Complete PFT with bronchodilator     Standing Status:   Future     Standing Expiration Date:   7/15/2023     Order Specific Question:   Release to patient     Answer:   Immediate    Stress test, pulmonary     Standing Status:   Future     Standing Expiration Date:   7/15/2023     Order Specific Question:   Reason for study     Answer:   Functional status     Order Specific Question:   Release to patient     Answer:   Immediate     Medication List with Changes/Refills   New Medications    ALBUTEROL (PROVENTIL/VENTOLIN HFA) 90 MCG/ACTUATION INHALER    Inhale 2 puffs into the lungs every 4 (four) hours as needed for Wheezing or Shortness of Breath. Rescue   Current Medications    SERTRALINE (ZOLOFT) 50 MG TABLET    Take 1.5 tablets (75 mg total) by mouth once daily.     Plan:   Discussed diagnosis, its evaluation, treatment and usual course. All questions answered.  Problem List Items Addressed This Visit     Depression with anxiety    Current Assessment & Plan     Managed by primary care provider, was referred to psych.  On Zoloft              Other Visit Diagnoses     SOB (shortness of breath) on exertion    -  Primary    Relevant Medications    albuterol (PROVENTIL/VENTOLIN HFA) 90 mcg/actuation inhaler    Other Relevant Orders    Complete PFT with bronchodilator    Stress test, pulmonary    Other form of dyspnea            I spent a total of 58 minutes on the day of the visit.  This includes face to face time and non-face to face time preparing to see the patient (eg, review of tests), obtaining and/or reviewing separately obtained history, documenting clinical information in the electronic or other health record, independently interpreting results and communicating results to the patient/family/caregiver, or care  coordinator.    Follow up for next available Shortness of Breath on exertion review cpft/pul stress.     (3) no apparent problem

## 2022-07-19 ENCOUNTER — APPOINTMENT (OUTPATIENT)
Dept: HEART AND VASCULAR | Facility: CLINIC | Age: 87
End: 2022-07-19

## 2022-07-26 ENCOUNTER — APPOINTMENT (OUTPATIENT)
Dept: PULMONOLOGY | Facility: CLINIC | Age: 87
End: 2022-07-26
Payer: MEDICARE

## 2022-07-26 VITALS
OXYGEN SATURATION: 95 % | HEIGHT: 60 IN | TEMPERATURE: 97.7 F | RESPIRATION RATE: 12 BRPM | SYSTOLIC BLOOD PRESSURE: 134 MMHG | HEART RATE: 89 BPM | BODY MASS INDEX: 28.47 KG/M2 | WEIGHT: 145 LBS | DIASTOLIC BLOOD PRESSURE: 77 MMHG

## 2022-07-26 PROCEDURE — 36415 COLL VENOUS BLD VENIPUNCTURE: CPT

## 2022-07-26 PROCEDURE — 99213 OFFICE O/P EST LOW 20 MIN: CPT | Mod: 25

## 2022-07-26 RX ORDER — NITROFURANTOIN (MONOHYDRATE/MACROCRYSTALS) 25; 75 MG/1; MG/1
100 CAPSULE ORAL
Qty: 14 | Refills: 0 | Status: DISCONTINUED | COMMUNITY
Start: 2021-10-21 | End: 2022-07-26

## 2022-07-26 RX ORDER — RIVAROXABAN 20 MG/1
20 TABLET, FILM COATED ORAL
Qty: 90 | Refills: 3 | Status: DISCONTINUED | COMMUNITY
Start: 2022-01-19 | End: 2022-07-26

## 2022-07-26 NOTE — HISTORY OF PRESENT ILLNESS
[FreeTextEntry1] : she is doing well  [de-identified] : she is doing well and the cardiologist increased her Metoprolol and she is now on Pradaxa.  her balance is unstable and is using a cane except in the house.  She is rarely using the albuterol.  NO wheezing.  No sob.  The bm are regular but sometimes loose.  No snoring

## 2022-07-26 NOTE — ASSESSMENT
[FreeTextEntry1] : Acquired hypothyroidism\par \par Continue Synthroid.  Follow-up on labs\par \par Atrial fibrillation\par \par Heart rate is controlled after adjusting the BB \par \par Coronary artery disease\par \par She is followed with Dr. Zaragoza and her cardiac status is stable with no clinical evidence neither of angina nor CHF.  Patient is on beta-blocker on anticoagulation\par \par Hypertension\par \par The blood pressure in the high normal and the beta-blocker will be increased by electrophysiologist and will monitor\par \par CVA\par \par Neuro status is stable and she is followed by neurologist at [facility].  I informed her to follow with her neurologist regarding the unsteady gait .  CT scan of head from 2019 reviewed with her\par \par Sarcoidosis\par \par The pulmonary status is stable.  Continue on the bronchodilator.  Patient is due for chest x-ray next year.\par \par \par \par Follow-up with a hemoglobin A1c she lost 14 pounds by diet control\par \par Bronchial asthma\par \par No evidence of bronchospasm and she is to continue on the bronchodilator.\par \par she lost weight and stable

## 2022-07-27 LAB
25(OH)D3 SERPL-MCNC: 66.2 NG/ML
ALBUMIN SERPL ELPH-MCNC: 4.7 G/DL
ALP BLD-CCNC: 81 U/L
ALT SERPL-CCNC: 10 U/L
ANION GAP SERPL CALC-SCNC: 15 MMOL/L
AST SERPL-CCNC: 18 U/L
BASOPHILS # BLD AUTO: 0.06 K/UL
BASOPHILS NFR BLD AUTO: 1 %
BILIRUB SERPL-MCNC: 0.5 MG/DL
BUN SERPL-MCNC: 17 MG/DL
CALCIUM SERPL-MCNC: 9.2 MG/DL
CHLORIDE SERPL-SCNC: 107 MMOL/L
CHOLEST SERPL-MCNC: 162 MG/DL
CO2 SERPL-SCNC: 20 MMOL/L
CREAT SERPL-MCNC: 1.12 MG/DL
EGFR: 48 ML/MIN/1.73M2
EOSINOPHIL # BLD AUTO: 0.17 K/UL
EOSINOPHIL NFR BLD AUTO: 2.8 %
ESTIMATED AVERAGE GLUCOSE: 126 MG/DL
GLUCOSE SERPL-MCNC: 85 MG/DL
HBA1C MFR BLD HPLC: 6 %
HCT VFR BLD CALC: 45.9 %
HDLC SERPL-MCNC: 43 MG/DL
HGB BLD-MCNC: 14.5 G/DL
IMM GRANULOCYTES NFR BLD AUTO: 0.3 %
LDLC SERPL CALC-MCNC: 91 MG/DL
LYMPHOCYTES # BLD AUTO: 1.71 K/UL
LYMPHOCYTES NFR BLD AUTO: 28.1 %
MAN DIFF?: NORMAL
MCHC RBC-ENTMCNC: 29.9 PG
MCHC RBC-ENTMCNC: 31.6 GM/DL
MCV RBC AUTO: 94.6 FL
MONOCYTES # BLD AUTO: 0.58 K/UL
MONOCYTES NFR BLD AUTO: 9.5 %
NEUTROPHILS # BLD AUTO: 3.54 K/UL
NEUTROPHILS NFR BLD AUTO: 58.3 %
NONHDLC SERPL-MCNC: 118 MG/DL
PLATELET # BLD AUTO: 259 K/UL
POTASSIUM SERPL-SCNC: 4.2 MMOL/L
PROT SERPL-MCNC: 7.2 G/DL
RBC # BLD: 4.85 M/UL
RBC # FLD: 13.4 %
SODIUM SERPL-SCNC: 142 MMOL/L
T3 SERPL-MCNC: 86 NG/DL
T4 SERPL-MCNC: 8.8 UG/DL
TRIGL SERPL-MCNC: 135 MG/DL
TSH SERPL-ACNC: 3.81 UIU/ML
WBC # FLD AUTO: 6.08 K/UL

## 2022-08-23 ENCOUNTER — APPOINTMENT (OUTPATIENT)
Dept: HEART AND VASCULAR | Facility: CLINIC | Age: 87
End: 2022-08-23

## 2022-08-23 VITALS
SYSTOLIC BLOOD PRESSURE: 173 MMHG | HEIGHT: 60 IN | TEMPERATURE: 96.2 F | HEART RATE: 86 BPM | DIASTOLIC BLOOD PRESSURE: 83 MMHG | BODY MASS INDEX: 28.47 KG/M2 | OXYGEN SATURATION: 99 % | WEIGHT: 145 LBS

## 2022-08-23 VITALS — SYSTOLIC BLOOD PRESSURE: 153 MMHG | DIASTOLIC BLOOD PRESSURE: 82 MMHG

## 2022-08-23 VITALS — SYSTOLIC BLOOD PRESSURE: 150 MMHG | DIASTOLIC BLOOD PRESSURE: 88 MMHG

## 2022-08-23 VITALS — DIASTOLIC BLOOD PRESSURE: 78 MMHG | SYSTOLIC BLOOD PRESSURE: 122 MMHG

## 2022-08-23 PROCEDURE — 99214 OFFICE O/P EST MOD 30 MIN: CPT

## 2022-08-24 RX ORDER — AMLODIPINE BESYLATE 5 MG/1
5 TABLET ORAL
Qty: 90 | Refills: 3 | Status: DISCONTINUED | COMMUNITY
Start: 2022-08-23 | End: 2022-08-24

## 2022-08-24 RX ORDER — FUROSEMIDE 20 MG/1
20 TABLET ORAL DAILY
Qty: 90 | Refills: 3 | Status: DISCONTINUED | COMMUNITY
Start: 2017-10-17 | End: 2022-08-24

## 2022-08-24 NOTE — CARDIOLOGY SUMMARY
[de-identified] : 5/10/2022: NSR, non-specific diffuse TW changes [de-identified] : 6/10/2022:Normal biventricular systolic function. Mildly enlarged LA. Grade II diastolic dysfunction. Mild to mod MR. Mild AI. No pericardial effusion.  [de-identified] : Hx of cath with stents

## 2022-08-24 NOTE — PHYSICAL EXAM

## 2022-08-24 NOTE — HISTORY OF PRESENT ILLNESS
[FreeTextEntry1] : 86 year old female with SSS s/p PPM, HTN, Afib on Pradaxa, CAD (hx of stents), pulmonary sarcoidosis, asthma, and TIA/CVA is here to establish cardiology care. \par \par Pt sees neurology (Dr. Rick) and Pulm (Dr. Riggs). \par \par BP is still elevated today. She took Toprol XL today. She is on Toprol XL 50 mg bid. Pt takes only 40 mg of Pravastatin.  \par \par No chest pain. No shortness of breath. No dyspnea on exertion. No orthopnea. No PND. \par \par Latest labs:\par LDL 91\par \par HDL 43\par K 4.5 \par Cr 1.1\par TSH 2.62\par A1c 5.8

## 2022-08-24 NOTE — DISCUSSION/SUMMARY
[FreeTextEntry1] : 86 year old female with SSS s/p PPM, HTN, Afib on Pradaxa, CAD (hx of stents), pulmonary sarcoidosis, asthma, and TIA/CVA is here to establish cardiology care. \par \par 1. Afib c.b CVA - paroxysmal. Regular on exam and rate controlled. Cont Pradaxa. \par 2. CAD - hx of PCI. > 5 yrs ago. Off antiplatelet agents, on Pradaxa bid\par 3. Hyperlipidemia - LDL 91 (down from 123)  (goal <70). Patient has only been taking 40 mg of Pravastatin instead of 80 mg qd.  Agreeable to go up to  Pravachol 80 mg qhs. Will re-check lipid profile in 3 mos. \par 4. HTN - BP elev. Cont Toprol XL 50 mg bid. She is tolerating it well but will not go up on bb given hx of asthma. ?hx of allergy to Lisinopril. She cannot recall but says the toungue may have swollen [place] she took it. I gave an Rx for Norvasc but patient remembered that she had a bad reaction to it as well. Will start HCTZ 25 mg qd and d/c lasix every other day for better BP control and some diuretic effect. \par 5. Pulm Sarcoidosis and Asthma - stable, followed by Pulm - Dr. Riggs. \par   \par f/u in 1 month

## 2022-10-25 ENCOUNTER — APPOINTMENT (OUTPATIENT)
Dept: HEART AND VASCULAR | Facility: CLINIC | Age: 87
End: 2022-10-25
Payer: MEDICARE

## 2022-10-25 ENCOUNTER — APPOINTMENT (OUTPATIENT)
Dept: PULMONOLOGY | Facility: CLINIC | Age: 87
End: 2022-10-25
Payer: MEDICARE

## 2022-10-25 ENCOUNTER — NON-APPOINTMENT (OUTPATIENT)
Age: 87
End: 2022-10-25

## 2022-10-25 VITALS
HEIGHT: 60 IN | TEMPERATURE: 97.7 F | OXYGEN SATURATION: 98 % | SYSTOLIC BLOOD PRESSURE: 137 MMHG | BODY MASS INDEX: 28.66 KG/M2 | HEART RATE: 90 BPM | DIASTOLIC BLOOD PRESSURE: 83 MMHG | WEIGHT: 146 LBS | RESPIRATION RATE: 12 BRPM

## 2022-10-25 VITALS
DIASTOLIC BLOOD PRESSURE: 83 MMHG | HEIGHT: 60 IN | TEMPERATURE: 95 F | BODY MASS INDEX: 28.66 KG/M2 | OXYGEN SATURATION: 97 % | WEIGHT: 146 LBS | HEART RATE: 92 BPM | SYSTOLIC BLOOD PRESSURE: 136 MMHG

## 2022-10-25 VITALS — SYSTOLIC BLOOD PRESSURE: 127 MMHG | HEART RATE: 92 BPM | DIASTOLIC BLOOD PRESSURE: 90 MMHG

## 2022-10-25 VITALS
TEMPERATURE: 97.2 F | OXYGEN SATURATION: 98 % | SYSTOLIC BLOOD PRESSURE: 170 MMHG | HEART RATE: 83 BPM | DIASTOLIC BLOOD PRESSURE: 79 MMHG

## 2022-10-25 DIAGNOSIS — Z23 ENCOUNTER FOR IMMUNIZATION: ICD-10-CM

## 2022-10-25 PROCEDURE — 36415 COLL VENOUS BLD VENIPUNCTURE: CPT

## 2022-10-25 PROCEDURE — G0008: CPT

## 2022-10-25 PROCEDURE — 93280 PM DEVICE PROGR EVAL DUAL: CPT

## 2022-10-25 PROCEDURE — 99213 OFFICE O/P EST LOW 20 MIN: CPT | Mod: 25

## 2022-10-25 PROCEDURE — 93000 ELECTROCARDIOGRAM COMPLETE: CPT | Mod: 59

## 2022-10-25 PROCEDURE — 99214 OFFICE O/P EST MOD 30 MIN: CPT | Mod: 25

## 2022-10-25 PROCEDURE — 99214 OFFICE O/P EST MOD 30 MIN: CPT

## 2022-10-25 PROCEDURE — 90662 IIV NO PRSV INCREASED AG IM: CPT

## 2022-10-25 RX ORDER — ALBUTEROL SULFATE 90 UG/1
108 (90 BASE) INHALANT RESPIRATORY (INHALATION)
Qty: 3 | Refills: 3 | Status: ACTIVE | COMMUNITY
Start: 2018-08-24 | End: 1900-01-01

## 2022-10-25 RX ORDER — ALBUTEROL SULFATE 2.5 MG/3ML
(2.5 MG/3ML) SOLUTION RESPIRATORY (INHALATION)
Qty: 320 | Refills: 3 | Status: ACTIVE | COMMUNITY
Start: 2021-05-10 | End: 1900-01-01

## 2022-10-25 NOTE — CARDIOLOGY SUMMARY
[de-identified] : 10/25//2022: NSR, non-specific diffuse TW changes\par 5/10/2022: NSR, non-specific diffuse TW changes [de-identified] : 6/10/2022:Normal biventricular systolic function. Mildly enlarged LA. Grade II diastolic dysfunction. Mild to mod MR. Mild AI. No pericardial effusion.  [de-identified] : Hx of cath with stents

## 2022-10-25 NOTE — HISTORY OF PRESENT ILLNESS
[Palpitations] : no palpitations [SOB] : no dyspnea [Syncope] : no syncope [Dizziness] : no dizziness [Chest Pain] : no chest pain or discomfort [Pain at Site] : no pain at device site [de-identified] : This is an 87 year old female with SSS S/P PPM, HTN, Afib, CAD and TIA/CVA. She was previously on Xarelto.  Records reflect an episode of gum bleeding (requiring packing) while on Xarelto, as well as possible GI ulcer and significant bruising.  Later started on Eliquis and reported significant bruising.  Eliquis was switched to Pradaxa and she continues to feel the bruising has been less on this agent.  Sees neurology and GI (Dr. Gutierrez).  Sees Dr. Fatima for CAD hx stent.  H/o gait instability- evaluated by Dr. Rick,  no acute infarct/hemorrhages.  OK to continue Pradaxa.  She denies any ongoing gait disturbances.  Felt unwell over the weekend with "cold chills"- wondering if she had an arrhythmia episode.

## 2022-10-25 NOTE — PHYSICAL EXAM

## 2022-10-25 NOTE — PHYSICAL EXAM

## 2022-10-25 NOTE — PROCEDURE
[No] : not [Sinus Bradycardia] : sinus bradycardia [Pacemaker] : pacemaker [Voltage: ___ volts] : Voltage was [unfilled] volts [Lead Imp:  ___ohms] : lead impedance was [unfilled] ohms [Sensing Amplitude ___mv] : sensing amplitude was [unfilled] mv [___V @] : [unfilled] V [___ ms] : [unfilled] ms [None] : none [de-identified] : ~ 50 bpm  [de-identified] : [employer] [de-identified] : Ana [de-identified] : 12/17/12 [de-identified] : AAIR-DDDR [de-identified] : 39-34 [de-identified] : longevity 20 months  [de-identified] : AP VS 95.8\par AT/AF burden 1% - episodes with -130s

## 2022-10-25 NOTE — HISTORY OF PRESENT ILLNESS
[FreeTextEntry1] : she is doing well [de-identified] : she is doing well.  She had the carotid doppler at [place] and she does not know the results.  She is wants the bone density.  She is doing well otherwise.

## 2022-10-25 NOTE — HISTORY OF PRESENT ILLNESS
[FreeTextEntry1] : 86 year old female with SSS s/p PPM, HTN, Afib on Pradaxa, CAD (hx of stents), pulmonary sarcoidosis, asthma, and TIA/CVA is here for f/u. \par \par Pt sees neurology (Dr. Rick) and Pulm (Dr. Riggs). \par \par 10/25/22: Pt complained of one episode of substernal chest pain in the last two weeks.It lasted about 5 mi, was pressure-like and uncomfortable. BP OK today and Metoprolol was increased today too 75 mg bid by EP to improve Afib rate control based on interrogation of device) as patient does not feel well during the episodes when the rate increases.  She feels a headache and chills when she has afib with RVR. \par \par No shortness of breath. No dyspnea on exertion. No orthopnea. No PND. \par \par _______________________\par Latest labs:\par LDL 91\par \par HDL 43\par K 4.5 \par Cr 1.1\par TSH 2.62\par A1c 5.8

## 2022-10-25 NOTE — ASSESSMENT
[FreeTextEntry1] : Acquired hypothyroidism\par \par Continue on the thyroid supplementation.  Follow-up on thyroid function test\par \par \par \par Coronary artery disease and atrial fibrillation\par \par Of the cardiac status is stable.  Heart rate is controlled.  The patient is on anticoagulation.  No clinical evidence neither of angina nor CHF.  Continue on the current regimen\par \par Hypertension\par \par The blood pressure is controlled and to continue on current meds\par \par CVA\par \par No evidence of new neurological deficit\par \par Sarcoidosis\par \par The pulmonary status is stable.  The patient is not on systemic steroids.  The patient had a recent chest images and PFT\par \par Flu vaccine was given in the left deltoid on tolerated well.  I will renew her medication.  Ordered bone density.

## 2022-10-25 NOTE — CARDIOLOGY SUMMARY
[de-identified] : 10/25//2022: NSR, non-specific diffuse TW changes\par 5/10/2022: NSR, non-specific diffuse TW changes [de-identified] : 6/10/2022:Normal biventricular systolic function. Mildly enlarged LA. Grade II diastolic dysfunction. Mild to mod MR. Mild AI. No pericardial effusion.  [de-identified] : Hx of cath with stents

## 2022-10-25 NOTE — DISCUSSION/SUMMARY
[FreeTextEntry1] : 86 year old female with SSS s/p PPM, HTN, Afib on Pradaxa, CAD (hx of stents), pulmonary sarcoidosis, asthma, and TIA/CVA is here to establish cardiology care. \par \par # Chest pain - 1 episode of substernal CP. ECG with baseline TWI but unchanged from prior. WIll get nuclear stress test. Pt walks with a cane and cannot walk on the treadmill. \par #. Afib c.b CVA - paroxysmal. Cont Pradaxa. Metoprolol increased to 75 mg bid by EP today for better rate control during paroxysms. \par #. CAD - hx of PCI. > 5 yrs ago. Off antiplatelet agents, on Pradaxa bid\par #. Hyperlipidemia - LDL 91 (down from 123)  (goal <70). Patient has only been taking 40 mg of Pravastatin instead of 80 mg qd.  Agreeable to go up to  Pravachol 80 mg qhs. Will re-check lipid profile next visit. \par #. HTN -OK today. Cont Toprol XL 75 mg bid. ?hx of allergy to Lisinopril. She cannot recall but says the toungue may have swollen [place] she took it. I gave an Rx for Norvasc but patient remembered that she had a bad reaction to it as well. Cont t HCTZ 25 mg qd (lasix stopped last visit). HCTZ seems to control ABP and more or less help with LE swelling. \par #. Pulm Sarcoidosis and Asthma - stable, followed by Pulm - Dr. Riggs. \par   \par Will call pt with stress test results.  [EKG obtained to assist in diagnosis and management of assessed problem(s)] : EKG obtained to assist in diagnosis and management of assessed problem(s)

## 2022-10-26 LAB
ALBUMIN SERPL ELPH-MCNC: 4.6 G/DL
ALP BLD-CCNC: 89 U/L
ALT SERPL-CCNC: 13 U/L
ANION GAP SERPL CALC-SCNC: 13 MMOL/L
AST SERPL-CCNC: 15 U/L
BASOPHILS # BLD AUTO: 0.05 K/UL
BASOPHILS NFR BLD AUTO: 0.7 %
BILIRUB SERPL-MCNC: 0.6 MG/DL
BUN SERPL-MCNC: 13 MG/DL
CALCIUM SERPL-MCNC: 10 MG/DL
CHLORIDE SERPL-SCNC: 100 MMOL/L
CHOLEST SERPL-MCNC: 178 MG/DL
CO2 SERPL-SCNC: 28 MMOL/L
CREAT SERPL-MCNC: 1.25 MG/DL
EGFR: 42 ML/MIN/1.73M2
EOSINOPHIL # BLD AUTO: 0.21 K/UL
EOSINOPHIL NFR BLD AUTO: 2.8 %
ESTIMATED AVERAGE GLUCOSE: 123 MG/DL
GLUCOSE SERPL-MCNC: 87 MG/DL
HBA1C MFR BLD HPLC: 5.9 %
HCT VFR BLD CALC: 47 %
HDLC SERPL-MCNC: 49 MG/DL
HGB BLD-MCNC: 14.8 G/DL
IMM GRANULOCYTES NFR BLD AUTO: 0.3 %
LDLC SERPL CALC-MCNC: 98 MG/DL
LYMPHOCYTES # BLD AUTO: 1.58 K/UL
LYMPHOCYTES NFR BLD AUTO: 21.4 %
MAN DIFF?: NORMAL
MCHC RBC-ENTMCNC: 30.9 PG
MCHC RBC-ENTMCNC: 31.5 GM/DL
MCV RBC AUTO: 98.1 FL
MONOCYTES # BLD AUTO: 0.59 K/UL
MONOCYTES NFR BLD AUTO: 8 %
NEUTROPHILS # BLD AUTO: 4.93 K/UL
NEUTROPHILS NFR BLD AUTO: 66.8 %
NONHDLC SERPL-MCNC: 129 MG/DL
PLATELET # BLD AUTO: 254 K/UL
POTASSIUM SERPL-SCNC: 4.5 MMOL/L
PROT SERPL-MCNC: 7.5 G/DL
RBC # BLD: 4.79 M/UL
RBC # FLD: 13 %
SODIUM SERPL-SCNC: 141 MMOL/L
T3 SERPL-MCNC: 108 NG/DL
T4 SERPL-MCNC: 11.4 UG/DL
TRIGL SERPL-MCNC: 153 MG/DL
TSH SERPL-ACNC: 5.64 UIU/ML
WBC # FLD AUTO: 7.38 K/UL

## 2022-11-01 ENCOUNTER — FORM ENCOUNTER (OUTPATIENT)
Age: 87
End: 2022-11-01

## 2022-11-02 ENCOUNTER — RESULT REVIEW (OUTPATIENT)
Age: 87
End: 2022-11-02

## 2022-11-02 ENCOUNTER — OUTPATIENT (OUTPATIENT)
Dept: OUTPATIENT SERVICES | Facility: HOSPITAL | Age: 87
LOS: 1 days | End: 2022-11-02
Payer: MEDICARE

## 2022-11-02 DIAGNOSIS — I10 ESSENTIAL (PRIMARY) HYPERTENSION: ICD-10-CM

## 2022-11-02 DIAGNOSIS — R07.9 CHEST PAIN, UNSPECIFIED: ICD-10-CM

## 2022-11-02 PROCEDURE — 78452 HT MUSCLE IMAGE SPECT MULT: CPT | Mod: 26

## 2022-11-02 PROCEDURE — 93016 CV STRESS TEST SUPVJ ONLY: CPT

## 2022-11-02 PROCEDURE — 93017 CV STRESS TEST TRACING ONLY: CPT

## 2022-11-02 PROCEDURE — A9500: CPT

## 2022-11-02 PROCEDURE — 93018 CV STRESS TEST I&R ONLY: CPT

## 2022-11-02 PROCEDURE — 78452 HT MUSCLE IMAGE SPECT MULT: CPT

## 2022-11-03 ENCOUNTER — OUTPATIENT (OUTPATIENT)
Dept: OUTPATIENT SERVICES | Facility: HOSPITAL | Age: 87
LOS: 1 days | End: 2022-11-03
Payer: MEDICARE

## 2022-11-03 ENCOUNTER — APPOINTMENT (OUTPATIENT)
Dept: RADIOLOGY | Facility: HOSPITAL | Age: 87
End: 2022-11-03

## 2022-11-03 ENCOUNTER — NON-APPOINTMENT (OUTPATIENT)
Age: 87
End: 2022-11-03

## 2022-11-03 PROCEDURE — 77080 DXA BONE DENSITY AXIAL: CPT | Mod: 26

## 2022-11-03 PROCEDURE — 77080 DXA BONE DENSITY AXIAL: CPT

## 2022-11-14 ENCOUNTER — RX RENEWAL (OUTPATIENT)
Age: 87
End: 2022-11-14

## 2022-12-24 NOTE — DISCUSSION/SUMMARY
22.4 [FreeTextEntry1] : EXAM: XR CHEST PORTABLE IMMED 1V\par \par PROCEDURE DATE: 05/04/2021\par \par \par \par INTERPRETATION: Chest, single portable view\par \par HISTORY: Wheezing dyspnea\par \par Comparison: None\par \par IMPRESSION:\par \par Support Devices: Left-sided double lead pacemaker with right atrial and right ventricular leads.\par Heart: Unremarkable\par Mediastinum: Unremarkable\par Lungs/Airways: Unremarkable\par Bones/Soft tissues: Unremarkable

## 2023-01-31 ENCOUNTER — APPOINTMENT (OUTPATIENT)
Dept: HEART AND VASCULAR | Facility: CLINIC | Age: 88
End: 2023-01-31
Payer: MEDICARE

## 2023-01-31 ENCOUNTER — NON-APPOINTMENT (OUTPATIENT)
Age: 88
End: 2023-01-31

## 2023-01-31 VITALS
WEIGHT: 145 LBS | TEMPERATURE: 95.4 F | HEIGHT: 60 IN | OXYGEN SATURATION: 95 % | DIASTOLIC BLOOD PRESSURE: 84 MMHG | BODY MASS INDEX: 28.47 KG/M2 | SYSTOLIC BLOOD PRESSURE: 152 MMHG | HEART RATE: 89 BPM

## 2023-01-31 VITALS — DIASTOLIC BLOOD PRESSURE: 83 MMHG | SYSTOLIC BLOOD PRESSURE: 147 MMHG | HEART RATE: 87 BPM

## 2023-01-31 PROCEDURE — 99213 OFFICE O/P EST LOW 20 MIN: CPT

## 2023-01-31 NOTE — CARDIOLOGY SUMMARY
[de-identified] : 10/25//2022: NSR, non-specific diffuse TW changes\par 5/10/2022: NSR, non-specific diffuse TW changes [de-identified] : 6/10/2022:Normal biventricular systolic function. Mildly enlarged LA. Grade II diastolic dysfunction. Mild to mod MR. Mild AI. No pericardial effusion.  [de-identified] : Hx of cath with stents  [de-identified] : Pharmacological stress test performed on 11/2/22 Hx of 2 stents in 2011. \par Normal ECG portion. Normal perfusion portion. TID ischemia vs elev EDP. No recurrent chest pains. BP was wnll during stress.

## 2023-01-31 NOTE — DISCUSSION/SUMMARY
[FreeTextEntry1] : 87 year old female with SSS s/p PPM, HTN, Afib on Pradaxa, CAD (hx of stents), pulmonary sarcoidosis, asthma, and TIA/CVA is here to establish cardiology care. \par \par # Chest pain - resolved. Normal perfusion on pharm stress testing. No symptoms or change in ET. TID may have been in setting of elev EDP given grade II DD.  \par #. Afib c.b CVA - paroxysmal. Cont Pradaxa. Cont Metoprolol 75 mg bid \par #. CAD - hx of PCI. > 5 yrs ago. Off antiplatelet agents, on Pradaxa bid\par #. Hyperlipidemia - LDL 98 (goal <70). Cont Pravastatin 80 mg qd.  Re-check lipid profile today. \par #. HTN - Elev today but per patient says it is controlled at home. Cont Toprol XL 75 mg bid. ?hx of allergy to Lisinopril. She cannot recall but says the toungue may have swollen [place] she took it. Hx of a bad reaction to Norvasc as well  Cont t HCTZ 25 mg qd (lasix stopped last visit). HCTZ seems to control ABP and more or less help with LE swelling. \par #. Pulm Sarcoidosis and Asthma - stable, followed by Pulm - Dr. Riggs. \par \par f/u in 3 mos

## 2023-01-31 NOTE — HISTORY OF PRESENT ILLNESS
[FreeTextEntry1] : 87 year old female with SSS s/p PPM, HTN, Afib on Pradaxa, CAD (hx of stents), pulmonary sarcoidosis, asthma, and TIA/CVA is here for f/u. \par \par Pt sees neurology (Dr. Rick) and Pulm (Dr. Riggs). \par \par 1/31/23: No cardiac complaints. BP has been much lower and within normal limits at home. \par \par 10/25/22: Pt complained of one episode of substernal chest pain in the last two weeks.It lasted about 5 mi, was pressure-like and uncomfortable. BP OK today and Metoprolol was increased today too 75 mg bid by EP to improve Afib rate control based on interrogation of device) as patient does not feel well during the episodes when the rate increases.  She feels a headache and chills when she has afib with RVR. \par \par No shortness of breath. No dyspnea on exertion. No orthopnea. No PND. \par \par _______________________\par Latest labs from 10/25/22:\par LDL 98\par K 4.5 \par Cr 1.25\par LFTs wnl\par A1c 5.9

## 2023-02-02 LAB
CHOLEST SERPL-MCNC: 154 MG/DL
HDLC SERPL-MCNC: 51 MG/DL
LDLC SERPL CALC-MCNC: 82 MG/DL
NONHDLC SERPL-MCNC: 103 MG/DL
TRIGL SERPL-MCNC: 105 MG/DL

## 2023-02-27 ENCOUNTER — APPOINTMENT (OUTPATIENT)
Dept: PULMONOLOGY | Facility: CLINIC | Age: 88
End: 2023-02-27
Payer: MEDICARE

## 2023-02-27 VITALS
BODY MASS INDEX: 28.07 KG/M2 | WEIGHT: 143 LBS | RESPIRATION RATE: 12 BRPM | DIASTOLIC BLOOD PRESSURE: 90 MMHG | OXYGEN SATURATION: 97 % | TEMPERATURE: 98.6 F | SYSTOLIC BLOOD PRESSURE: 159 MMHG | HEIGHT: 60 IN | HEART RATE: 88 BPM

## 2023-02-27 PROCEDURE — 99213 OFFICE O/P EST LOW 20 MIN: CPT

## 2023-02-27 NOTE — HISTORY OF PRESENT ILLNESS
[FreeTextEntry1] : she is doing OK [de-identified] : she is wondering if she can have vitamin D infusion.  she had the flu and she is doing well. She is questioning if she should get the Singels vaccine.  She is concerned about her calf and she feels a pain at night only when she is supune and on the left side.  No sob.  No wheezing.  She is unsteady while walking

## 2023-02-27 NOTE — ASSESSMENT
[FreeTextEntry1] : Acquired hypothyroidism  The patient to continue on thyroid replacement.  We will follow-up with lab    Atrial fibrillation  Rate is controlled and needs to continue on anticoagulation  Hypertension  ~Blood pressure 130/75.  The blood pressure is controlled and to continue current regimen  Bronchial asthma  No evidence of bronchospasm continue bronchodilators  CVA  No evidence new neurological but she was referred to neurology to evaluate for the heart complaint of an unsteady gait  Osteopenia  The patient daughter and IV injection of vitamin D and I told to have no objection but then she again is supplemented  She is scheduled for bone density in 2 years  Sarcoidosis  Stable no evidence of a flareup  She is scheduled for mammogram.

## 2023-03-23 ENCOUNTER — RX RENEWAL (OUTPATIENT)
Age: 88
End: 2023-03-23

## 2023-03-23 RX ORDER — BUDESONIDE AND FORMOTEROL FUMARATE DIHYDRATE 160; 4.5 UG/1; UG/1
160-4.5 AEROSOL RESPIRATORY (INHALATION)
Qty: 10.2 | Refills: 11 | Status: ACTIVE | COMMUNITY
Start: 2023-03-23 | End: 1900-01-01

## 2023-04-04 ENCOUNTER — APPOINTMENT (OUTPATIENT)
Dept: HEART AND VASCULAR | Facility: CLINIC | Age: 88
End: 2023-04-04
Payer: MEDICARE

## 2023-04-04 ENCOUNTER — NON-APPOINTMENT (OUTPATIENT)
Age: 88
End: 2023-04-04

## 2023-04-04 VITALS
OXYGEN SATURATION: 98 % | SYSTOLIC BLOOD PRESSURE: 133 MMHG | HEART RATE: 91 BPM | DIASTOLIC BLOOD PRESSURE: 79 MMHG | TEMPERATURE: 95 F | HEIGHT: 60 IN | WEIGHT: 143 LBS | BODY MASS INDEX: 28.07 KG/M2

## 2023-04-04 PROCEDURE — 93000 ELECTROCARDIOGRAM COMPLETE: CPT

## 2023-04-04 PROCEDURE — 93280 PM DEVICE PROGR EVAL DUAL: CPT

## 2023-04-04 PROCEDURE — 99214 OFFICE O/P EST MOD 30 MIN: CPT | Mod: 25

## 2023-04-04 PROCEDURE — 99213 OFFICE O/P EST LOW 20 MIN: CPT | Mod: 25

## 2023-04-04 NOTE — HISTORY OF PRESENT ILLNESS
[Palpitations] : no palpitations [SOB] : no dyspnea [Syncope] : no syncope [Dizziness] : no dizziness [Chest Pain] : no chest pain or discomfort [Pain at Site] : no pain at device site [de-identified] : This is an 87 year old female with SSS S/P PPM, HTN, Afib, CAD and TIA/CVA. She was previously on Xarelto.  Records reflect an episode of gum bleeding (requiring packing) while on Xarelto, as well as possible GI ulcer and significant bruising.  Later started on Eliquis and reported significant bruising.  Eliquis was switched to Pradaxa and she continues to feel the bruising has been less on this agent.  Sees neurology and GI (Dr. Gutierrez).  Sees Dr. Fatima for CAD hx stent.  H/o gait instability- evaluated by Dr. Rick,  no acute infarct/hemorrhages.  OK to continue Pradaxa.  She denies any ongoing gait disturbances.  She offers no acute complaints today.  \par \par SEE PACEART

## 2023-04-04 NOTE — HISTORY OF PRESENT ILLNESS
[FreeTextEntry1] : 87 year old female with SSS s/p PPM, HTN, Afib on Pradaxa, CAD (hx of stents), pulmonary sarcoidosis, asthma, and TIA/CVA is here for f/u. \par \par Pt sees neurology (Dr. Rick) and Pulm (Dr. Riggs). \par \par 4/4/23: No complaints. BP has been well controlled at home. \par \par 1/31/23: No cardiac complaints. BP has been much lower and within normal limits at home. \par \par 10/25/22: Pt complained of one episode of substernal chest pain in the last two weeks.It lasted about 5 mi, was pressure-like and uncomfortable. BP OK today and Metoprolol was increased today too 75 mg bid by EP to improve Afib rate control based on interrogation of device) as patient does not feel well during the episodes when the rate increases.  She feels a headache and chills when she has afib with RVR. \par \par No shortness of breath. No dyspnea on exertion. No orthopnea. No PND. \par \par _______________________\par Latest labs from 10/25/22:\par LDL 98\par K 4.5 \par Cr 1.25\par LFTs wnl\par A1c 5.9

## 2023-04-04 NOTE — PHYSICAL EXAM

## 2023-04-04 NOTE — PHYSICAL EXAM
[General Appearance - Well Developed] : well developed [Normal Appearance] : normal appearance [Well Groomed] : well groomed [General Appearance - Well Nourished] : well nourished [No Deformities] : no deformities [General Appearance - In No Acute Distress] : no acute distress [Left Infraclavicular] : left infraclavicular area [Clean] : clean [Dry] : dry [Well-Healed] : well-healed [Palpable Crepitus] : no palpable crepitus [Bleeding] : no active bleeding [Foul Odor] : no foul smell [Purulent Drainage] : no purulent drainage [Serosanguineous Drainage] : no serosanquineous drainage [Serous Drainage] : no serous drainage [Erythema] : not erythematous [Warm] : not warm [Tender] : not tender [Indurated] : not indurated [Fluctuant] : not fluctuant [Well Developed] : well developed [Well Nourished] : well nourished [No Acute Distress] : no acute distress [Normal Conjunctiva] : normal conjunctiva [Normal Venous Pressure] : normal venous pressure [No Carotid Bruit] : no carotid bruit [Normal S1, S2] : normal S1, S2 [No Murmur] : no murmur [No Rub] : no rub [No Gallop] : no gallop [Clear Lung Fields] : clear lung fields [Good Air Entry] : good air entry [No Respiratory Distress] : no respiratory distress  [Soft] : abdomen soft [Non Tender] : non-tender [No Masses/organomegaly] : no masses/organomegaly [Normal Bowel Sounds] : normal bowel sounds [Normal Gait] : normal gait [No Cyanosis] : no cyanosis [No Clubbing] : no clubbing [No Varicosities] : no varicosities [No Rash] : no rash [No Skin Lesions] : no skin lesions [Moves all extremities] : moves all extremities [No Focal Deficits] : no focal deficits [Normal Speech] : normal speech [Alert and Oriented] : alert and oriented [Normal memory] : normal memory [de-identified] : 1+ edema b/l

## 2023-04-04 NOTE — DISCUSSION/SUMMARY
[FreeTextEntry1] : 87 year old female with SSS s/p PPM, HTN, Afib on Pradaxa, CAD (hx of stents), pulmonary sarcoidosis, asthma, and TIA/CVA is here to establish cardiology care. \par \par # Chest pain - resolved. Normal perfusion on pharm stress testing. No symptoms or change in ET. TID may have been in setting of elev EDP given grade II DD.  \par #. Afib c/b CVA - paroxysmal. Cont Pradaxa. Rate controlled. Cont Metoprolol 75 mg bid. f/u with EP re: battery change for PPM, for SSS \par #. CAD - hx of PCI. > 5 yrs ago. Off antiplatelet agents, on Pradaxa bid\par #. Hyperlipidemia - LDL 98 (goal <70). Cont Pravastatin 80 mg qd.  Re-check lipid profile in May. Pt has become a vegetarian in the past 3 mos and would like to tighten the lipid control with dietary modifications. Does not want to switch to a high intensity statim at this time.  \par #. HTN - Acceptable today and per patient says it is controlled at home. Cont Toprol XL 75 mg bid. ?hx of allergy to Lisinopril. She cannot recall but says the tongue may have swollen [place] she took it. Hx of a bad reaction to Norvasc as well  Cont t HCTZ 25 mg qd (lasix stopped last visit). HCTZ seems to control BP and more or less help with LE swelling. \par #. Pulm Sarcoidosis and Asthma - stable, followed by Pulm - Dr. Riggs. \par \par f/u in 3 mos [EKG obtained to assist in diagnosis and management of assessed problem(s)] : EKG obtained to assist in diagnosis and management of assessed problem(s)

## 2023-04-04 NOTE — CARDIOLOGY SUMMARY
[de-identified] : 4/4/23: NSR, non-specific diffuse TW changes\par 10/25//2022: NSR, non-specific diffuse TW changes\par 5/10/2022: NSR, non-specific diffuse TW changes [de-identified] : 6/10/2022:Normal biventricular systolic function. Mildly enlarged LA. Grade II diastolic dysfunction. Mild to mod MR. Mild AI. No pericardial effusion.  [de-identified] : Pharmacological stress test performed on 11/2/22 Hx of 2 stents in 2011. \par Normal ECG portion. Normal perfusion portion. TID ischemia vs elev EDP. No recurrent chest pains. BP was wnll during stress.  [de-identified] : Hx of cath with stents

## 2023-04-04 NOTE — CARDIOLOGY SUMMARY
[de-identified] : 5/10/2022: NSR, non-specific diffuse TW changes [de-identified] : 6/10/2022:Normal biventricular systolic function. Mildly enlarged LA. Grade II diastolic dysfunction. Mild to mod MR. Mild AI. No pericardial effusion.  [de-identified] : Hx of cath with stents

## 2023-04-04 NOTE — ADDENDUM
[FreeTextEntry1] : I, María Go, am scribing for and the presence of Dr. Garcia the following sections: HPI, PMH,Family/social history, ROS, Physical Exam, Assessment / Plan.\par \par I, Rigo Garcia, personally performed the services described in the documentation, reviewed the documentation recorded by the scribe in my presence and it accurately and completely records my words and actions.\par

## 2023-04-12 ENCOUNTER — APPOINTMENT (OUTPATIENT)
Dept: NEUROLOGY | Facility: CLINIC | Age: 88
End: 2023-04-12
Payer: MEDICARE

## 2023-04-12 VITALS
HEART RATE: 87 BPM | SYSTOLIC BLOOD PRESSURE: 150 MMHG | HEIGHT: 60 IN | WEIGHT: 145 LBS | OXYGEN SATURATION: 95 % | DIASTOLIC BLOOD PRESSURE: 80 MMHG | TEMPERATURE: 95.4 F | BODY MASS INDEX: 28.47 KG/M2

## 2023-04-12 PROCEDURE — 99204 OFFICE O/P NEW MOD 45 MIN: CPT

## 2023-04-12 RX ORDER — MECLIZINE HYDROCHLORIDE 25 MG/1
25 TABLET ORAL 3 TIMES DAILY
Qty: 30 | Refills: 3 | Status: DISCONTINUED | COMMUNITY
Start: 2020-03-06 | End: 2023-04-12

## 2023-04-12 RX ORDER — PREDNISONE 20 MG/1
20 TABLET ORAL DAILY
Qty: 30 | Refills: 1 | Status: DISCONTINUED | COMMUNITY
Start: 2022-10-25 | End: 2023-04-12

## 2023-04-12 RX ORDER — AZITHROMYCIN 250 MG/1
250 TABLET, FILM COATED ORAL
Qty: 1 | Refills: 0 | Status: DISCONTINUED | COMMUNITY
Start: 2023-02-08 | End: 2023-04-12

## 2023-04-12 NOTE — HISTORY OF PRESENT ILLNESS
[FreeTextEntry1] : The patient is a very pleasant 87-year-old female with a history of hypertension, atrial fibrillation (on Pradaxa, previously intolerant of other DOACs), CAD status post PCI, pulmonary sarcoidosis, asthma, TIA/stroke, and SSS status post PPM.  She presents for evaluation of chronic imbalance.\par \par The patient states she has had difficulties with her balance for many years.  Symptoms seem to be progressively worsening.  There nonpositional or orthostatic.  She is unclear of definite triggers but reduced water intake seems to be a potential trigger.  She denies any vertigo or other associated neurologic symptoms.  She has good days and bad days.  She denies recent falls.\par \par Of note, the patient did have a head CT in 2019 which showed multiple chronic strokes (right frontal, left temporal, left cerebellum).

## 2023-04-12 NOTE — PHYSICAL EXAM
[FreeTextEntry1] : Alert.  Fully oriented.  Speech and language are intact.  Cranial nerves II-XII are intact.  Motor exam reveals intact strength with individual muscle testing in bilateral upper and lower extremities.  Tone is normal.  Reflexes are normal.  Toes are downgoing.  Sensation is intact to light touch, vibration, proprioception, temperature in distal extremities, L slightly reduced to R.  Finger-to-nose and heel-to-shin are intact.  Rapid alternating movements are normal in the upper and lower extremities.  Gait is imbalanced, listing to R.

## 2023-04-28 ENCOUNTER — FORM ENCOUNTER (OUTPATIENT)
Age: 88
End: 2023-04-28

## 2023-04-29 ENCOUNTER — TRANSCRIPTION ENCOUNTER (OUTPATIENT)
Age: 88
End: 2023-04-29

## 2023-04-29 ENCOUNTER — INPATIENT (INPATIENT)
Facility: HOSPITAL | Age: 88
LOS: 0 days | Discharge: LEFT BEFORE DISCHARGE | DRG: 69 | End: 2023-04-30
Attending: PSYCHIATRY & NEUROLOGY | Admitting: PSYCHIATRY & NEUROLOGY
Payer: MEDICARE

## 2023-04-29 VITALS
RESPIRATION RATE: 17 BRPM | HEIGHT: 60 IN | DIASTOLIC BLOOD PRESSURE: 82 MMHG | HEART RATE: 84 BPM | SYSTOLIC BLOOD PRESSURE: 159 MMHG | WEIGHT: 143.96 LBS | OXYGEN SATURATION: 95 % | TEMPERATURE: 98 F

## 2023-04-29 LAB
ANION GAP SERPL CALC-SCNC: 10 MMOL/L — SIGNIFICANT CHANGE UP (ref 5–17)
APPEARANCE UR: CLEAR — SIGNIFICANT CHANGE UP
BACTERIA # UR AUTO: ABNORMAL /HPF
BILIRUB UR-MCNC: NEGATIVE — SIGNIFICANT CHANGE UP
BUN SERPL-MCNC: 16 MG/DL — SIGNIFICANT CHANGE UP (ref 7–23)
CALCIUM SERPL-MCNC: 9.2 MG/DL — SIGNIFICANT CHANGE UP (ref 8.4–10.5)
CHLORIDE SERPL-SCNC: 104 MMOL/L — SIGNIFICANT CHANGE UP (ref 96–108)
CO2 SERPL-SCNC: 27 MMOL/L — SIGNIFICANT CHANGE UP (ref 22–31)
COLOR SPEC: YELLOW — SIGNIFICANT CHANGE UP
CREAT SERPL-MCNC: 1.09 MG/DL — SIGNIFICANT CHANGE UP (ref 0.5–1.3)
DIFF PNL FLD: ABNORMAL
EGFR: 49 ML/MIN/1.73M2 — LOW
EPI CELLS # UR: SIGNIFICANT CHANGE UP /HPF (ref 0–5)
GLUCOSE SERPL-MCNC: 102 MG/DL — HIGH (ref 70–99)
GLUCOSE UR QL: NEGATIVE — SIGNIFICANT CHANGE UP
HCT VFR BLD CALC: 41.6 % — SIGNIFICANT CHANGE UP (ref 34.5–45)
HGB BLD-MCNC: 13.4 G/DL — SIGNIFICANT CHANGE UP (ref 11.5–15.5)
KETONES UR-MCNC: NEGATIVE — SIGNIFICANT CHANGE UP
LEUKOCYTE ESTERASE UR-ACNC: NEGATIVE — SIGNIFICANT CHANGE UP
MCHC RBC-ENTMCNC: 30 PG — SIGNIFICANT CHANGE UP (ref 27–34)
MCHC RBC-ENTMCNC: 32.2 GM/DL — SIGNIFICANT CHANGE UP (ref 32–36)
MCV RBC AUTO: 93.3 FL — SIGNIFICANT CHANGE UP (ref 80–100)
NITRITE UR-MCNC: NEGATIVE — SIGNIFICANT CHANGE UP
NRBC # BLD: 0 /100 WBCS — SIGNIFICANT CHANGE UP (ref 0–0)
PH UR: 6.5 — SIGNIFICANT CHANGE UP (ref 5–8)
PLATELET # BLD AUTO: 270 K/UL — SIGNIFICANT CHANGE UP (ref 150–400)
POTASSIUM SERPL-MCNC: 4.8 MMOL/L — SIGNIFICANT CHANGE UP (ref 3.5–5.3)
POTASSIUM SERPL-SCNC: 4.8 MMOL/L — SIGNIFICANT CHANGE UP (ref 3.5–5.3)
PROT UR-MCNC: 30 MG/DL
RBC # BLD: 4.46 M/UL — SIGNIFICANT CHANGE UP (ref 3.8–5.2)
RBC # FLD: 13.8 % — SIGNIFICANT CHANGE UP (ref 10.3–14.5)
RBC CASTS # UR COMP ASSIST: < 5 /HPF — SIGNIFICANT CHANGE UP
SARS-COV-2 RNA SPEC QL NAA+PROBE: NEGATIVE — SIGNIFICANT CHANGE UP
SODIUM SERPL-SCNC: 141 MMOL/L — SIGNIFICANT CHANGE UP (ref 135–145)
SP GR SPEC: 1.01 — SIGNIFICANT CHANGE UP (ref 1–1.03)
TROPONIN T SERPL-MCNC: <0.01 NG/ML — SIGNIFICANT CHANGE UP (ref 0–0.01)
UROBILINOGEN FLD QL: 0.2 E.U./DL — SIGNIFICANT CHANGE UP
WBC # BLD: 10.1 K/UL — SIGNIFICANT CHANGE UP (ref 3.8–10.5)
WBC # FLD AUTO: 10.1 K/UL — SIGNIFICANT CHANGE UP (ref 3.8–10.5)
WBC UR QL: < 5 /HPF — SIGNIFICANT CHANGE UP

## 2023-04-29 PROCEDURE — 70450 CT HEAD/BRAIN W/O DYE: CPT | Mod: 26,MA,59

## 2023-04-29 PROCEDURE — 99285 EMERGENCY DEPT VISIT HI MDM: CPT

## 2023-04-29 PROCEDURE — 70498 CT ANGIOGRAPHY NECK: CPT | Mod: 26,MA

## 2023-04-29 PROCEDURE — 71046 X-RAY EXAM CHEST 2 VIEWS: CPT | Mod: 26

## 2023-04-29 PROCEDURE — 70496 CT ANGIOGRAPHY HEAD: CPT | Mod: 26,MA

## 2023-04-29 RX ORDER — LEVOTHYROXINE SODIUM 125 MCG
1 TABLET ORAL
Qty: 0 | Refills: 0 | DISCHARGE

## 2023-04-29 RX ORDER — ALBUTEROL 90 UG/1
2 AEROSOL, METERED ORAL
Qty: 0 | Refills: 0 | DISCHARGE

## 2023-04-29 RX ORDER — DABIGATRAN ETEXILATE MESYLATE 150 MG/1
150 CAPSULE ORAL EVERY 12 HOURS
Refills: 0 | Status: DISCONTINUED | OUTPATIENT
Start: 2023-04-29 | End: 2023-04-30

## 2023-04-29 RX ORDER — FAMOTIDINE 10 MG/ML
20 INJECTION INTRAVENOUS ONCE
Refills: 0 | Status: COMPLETED | OUTPATIENT
Start: 2023-04-29 | End: 2023-04-29

## 2023-04-29 RX ORDER — FUROSEMIDE 40 MG
1 TABLET ORAL
Qty: 0 | Refills: 0 | DISCHARGE

## 2023-04-29 RX ORDER — METOPROLOL TARTRATE 50 MG
1.5 TABLET ORAL
Refills: 0 | DISCHARGE

## 2023-04-29 RX ORDER — METOPROLOL TARTRATE 50 MG
1 TABLET ORAL
Qty: 0 | Refills: 0 | DISCHARGE

## 2023-04-29 RX ORDER — METOPROLOL TARTRATE 50 MG
75 TABLET ORAL EVERY 12 HOURS
Refills: 0 | Status: DISCONTINUED | OUTPATIENT
Start: 2023-04-29 | End: 2023-04-30

## 2023-04-29 RX ORDER — ACETAMINOPHEN 500 MG
975 TABLET ORAL ONCE
Refills: 0 | Status: COMPLETED | OUTPATIENT
Start: 2023-04-29 | End: 2023-04-29

## 2023-04-29 RX ORDER — SODIUM CHLORIDE 9 MG/ML
500 INJECTION INTRAMUSCULAR; INTRAVENOUS; SUBCUTANEOUS ONCE
Refills: 0 | Status: COMPLETED | OUTPATIENT
Start: 2023-04-29 | End: 2023-04-29

## 2023-04-29 RX ORDER — LEVOTHYROXINE SODIUM 125 MCG
75 TABLET ORAL DAILY
Refills: 0 | Status: DISCONTINUED | OUTPATIENT
Start: 2023-04-29 | End: 2023-04-30

## 2023-04-29 RX ORDER — ATORVASTATIN CALCIUM 80 MG/1
80 TABLET, FILM COATED ORAL AT BEDTIME
Refills: 0 | Status: DISCONTINUED | OUTPATIENT
Start: 2023-04-29 | End: 2023-04-30

## 2023-04-29 RX ORDER — AMLODIPINE BESYLATE 2.5 MG/1
1 TABLET ORAL
Qty: 0 | Refills: 0 | DISCHARGE

## 2023-04-29 RX ADMIN — Medication 75 MILLIGRAM(S): at 17:40

## 2023-04-29 RX ADMIN — Medication 20 MILLILITER(S): at 09:33

## 2023-04-29 RX ADMIN — Medication 975 MILLIGRAM(S): at 09:34

## 2023-04-29 RX ADMIN — DABIGATRAN ETEXILATE MESYLATE 150 MILLIGRAM(S): 150 CAPSULE ORAL at 17:40

## 2023-04-29 RX ADMIN — FAMOTIDINE 20 MILLIGRAM(S): 10 INJECTION INTRAVENOUS at 09:34

## 2023-04-29 RX ADMIN — SODIUM CHLORIDE 500 MILLILITER(S): 9 INJECTION INTRAMUSCULAR; INTRAVENOUS; SUBCUTANEOUS at 10:06

## 2023-04-29 NOTE — PATIENT PROFILE ADULT - FALL HARM RISK - RISK INTERVENTIONS

## 2023-04-29 NOTE — ED PROVIDER NOTE - OBJECTIVE STATEMENT
88 yo F h/o sarcoid, cad, s/p ppm, htn, tia c/o noting high bp 170's at home despite compliance w metoprolol 75 bid; pt contacted her cardiologist, Dr Olivo, who told her to take extra metoprolol; she's been taking 1 extra 50 mg tab a day w/o improved bp. Pt states she thinks she had a tia 3 d ago but can't recall what sx make her say this.  Pt notes 3 d L sided ha, clogged ear sensation R ear and also word finding difficulty.  Pt also notes burning sensation in her throat and feeling a strange sensation starting in lower chest that goes upwards.  Pt denies h/o gerd.  No other cp, palpitations, sob.  No new numbness/weakness in ext - notes LUE numbness and pain since fx LUE many yrs ago.  No abd pain, n/v/d.  + occ cough.  No fever.  No other uri sx.

## 2023-04-29 NOTE — H&P ADULT - NSHPLABSRESULTS_GEN_ALL_CORE
Vital Signs Last 24 Hrs  T(C): 36.5 (2023 11:53), Max: 36.7 (2023 08:10)  T(F): 97.7 (2023 11:53), Max: 98 (2023 08:10)  HR: 86 (2023 15:42) (56 - 86)  BP: 160/74 (2023 15:42) (147/71 - 177/72)  BP(mean): 107 (2023 15:42) (107 - 107)  RR: 19 (2023 15:42) (16 - 19)  SpO2: 100% (2023 15:42) (95% - 100%)    Parameters below as of 2023 15:42  Patient On (Oxygen Delivery Method): room air    Fingerstick Blood Glucose: CAPILLARY BLOOD GLUCOSE    POCT Blood Glucose.: 85 mg/dL (2023 08:17)    LABS:                        13.4   10.10 )-----------( 270      ( 2023 09:31 )             41.6         141  |  104  |  16  ----------------------------<  102<H>  4.8   |  27  |  1.09    Ca    9.2      2023 09:31        CARDIAC MARKERS ( 2023 09:31 )  x     / <0.01 ng/mL / x     / x     / x          Urinalysis Basic - ( 2023 09:22 )    Color: Yellow / Appearance: Clear / S.015 / pH: x  Gluc: x / Ketone: NEGATIVE  / Bili: Negative / Urobili: 0.2 E.U./dL   Blood: x / Protein: 30 mg/dL / Nitrite: NEGATIVE   Leuk Esterase: NEGATIVE / RBC: < 5 /HPF / WBC < 5 /HPF   Sq Epi: x / Non Sq Epi: x / Bacteria: Many /HPF        RADIOLOGY & ADDITIONAL STUDIES:    < from: CT Head No Cont (23 @ 11:40) >      IMPRESSION:  No acute intracranial findings.    No change from 2019; chronic right frontal, left parietal, and   left cerebellar infarcts.    < end of copied text >      < from: CT Angio Head w/ IV Cont (23 @ 11:41) >      IMPRESSION:    No arterial occlusion or high grade stenosis on CTA head or neck. No   significant change since 2014.    < end of copied text >

## 2023-04-29 NOTE — H&P ADULT - NSHPPHYSICALEXAM_GEN_ALL_CORE
Physical exam:  General: No acute distress, awake and alert  Cardiovascular: Regular rate and rhythm, no murmurs, rubs, or gallops. No bruits  Pulmonary: Anterior breath sounds clear bilaterally, no crackles or wheezing. No use of accessory muscles  GI: Abdomen soft, non-tender, non-distended    Neurologic:  -Mental status: Awake, alert, oriented to person, place, and time. Speech is fluent with intact naming, repetition, and comprehension, no dysarthria. No expressive aphasia noted on interview. Recent and remote memory intact. Follows commands. Attention/concentration intact. Fund of knowledge appropriate.  -Cranial nerves:   II: Visual fields are full to confrontation.  III, IV, VI: Extraocular movements are intact without nystagmus.   V:  Facial sensation V1-V3 equal and intact   VII: Face is symmetric with normal eye closure and smile  VIII: Hearing is bilaterally intact to finger rub  IX, X: Uvula is midline and soft palate rises symmetrically  XI: Head turning and shoulder shrug are intact.  XII: Tongue protrudes midline  Motor: Normal bulk and tone. No pronator drift. Strength bilateral upper extremity 5/5, bilateral lower extremities 5/5.  Sensation: Intact to light touch bilaterally. No neglect or extinction on double simultaneous testing.  Coordination: No dysmetria on finger-to-nose and heel-to-shin bilaterally  Reflexes: Downgoing toes bilaterally   Gait: Narrow gait and steady    NIHSS: 0

## 2023-04-29 NOTE — H&P ADULT - ASSESSMENT
87y Female with PMHx of hypertension, atrial fibrillation (on Pradaxa, previously intolerant of other DOACs), CAD status post PCI, pulmonary sarcoidosis, asthma, TIA/stroke, and SSS status post PPM presenting to ED for episode of hypertension up to sBP 179 yesterday. While speaking with her friend on the phone last night, pt with a couple second episode of difficulty recalling a word "it was on the tip of my tongue." which quickly resolved. Pt states she had another episode of this today when speaking with ED provider, also quickly resolved. Stroke consulted for these episodes. Pt otherwise denies headache, visual disturbances, weakness/numbness of extremities, slurred speech, gait instability, dizziness.     Neuro  #CVA workup  - continue home pradaxa 150mg BID  - start atorvastatin 40mg daily, on home pravastatin 80mg at home   - q4hr stroke neuro checks and vitals  - obtain MRI Brain without contrast  - Stroke Code HCT Results:   - Stroke Code CTA Results:  - Stroke education    Cards  #HTN  - permissive hypertension, Goal -180  - hold home blood pressure medication for now  - obtain TTE with bubble  - Stroke Code EKG Results:    #HLD  - high dose statin as above in CVA  - LDL results:     Pulm  - call provider if SPO2 < 94%    GI  #Nutrition/Fluids/Electrolytes   - replete K<4 and Mg <2  - Diet:  - IVF:     Renal  - monitor and trend Cr    Infectious Disease  - Stroke Code CXR results:   - afebrile on admission, no leukocytosis    Endocrine  #DM  - A1C results:   - ISS    - TSH results:    DVT Prophylaxis  - lovenox sq for DVT prophylaxis   - SCDs for DVT prophylaxis       IDR Goals: Goals reviewed at interdisciplinary rounds with case management, social work, physical therapy, occupational therapy, and speech language pathology.   Please see specific therapy  notes for in depth goals.  Dispo: **********(Acute rehab - can tolerate 3 hours of therapy)     Discussed daily hospital plans and goals with patient and family at bedside. (Called and updated family.)    Discussed with Neurology Attending  87y Female with PMHx of hypertension, atrial fibrillation (on Pradaxa, previously intolerant of other DOACs), hypothyroid, CAD status post PCI, pulmonary sarcoidosis, asthma, TIA/stroke, and SSS status post PPM presenting to ED for episode of hypertension up to sBP 179 yesterday. While speaking with her friend on the phone last night, pt with a couple second episode of difficulty recalling a word "it was on the tip of my tongue." which quickly resolved. Pt states she had another episode of this today when speaking with ED provider, also quickly resolved. Stroke consulted for these episodes. Pt otherwise denies headache, visual disturbances, weakness/numbness of extremities, slurred speech, gait instability, dizziness.     Neuro  #CVA workup  - continue home pradaxa 150mg BID  - start atorvastatin 40mg daily, on home pravastatin 80mg at home   - q4hr stroke neuro checks and vitals  - obtain repeat CTH 4/30 AM - pt with MRI non-compatible PPM  - obtain EEG to r/o seizure due to stereotactic episodes  - Stroke Code HCT Results: neg, chronic infarcts, right frontal, left parietal, and left cerebellar infarcts  - Stroke Code CTA Results: neg  - Stroke education    Cards  #afib - intolerant to other DOACs other than Pradaxa  - continue home metoprolol 75mg ER  - continue home pradaxa 150mg BID as AC     #HTN  - goal sBP <180  - EKG Results: pacemaker: good capture, regular rhythm and appropriate intervals    #HLD  - high dose statin as above in CVA  - LDL results: pending    Pulm  - call provider if SPO2 < 94%    GI  #Nutrition/Fluids/Electrolytes   - replete K<4 and Mg <2  - Diet: DASH    Renal  - monitor and trend Cr    Infectious Disease  - afebrile on admission, no leukocytosis    Endocrine  #DM  - A1C results: pending    - TSH results: pending  - continue home synthroid 75mcg    DVT Prophylaxis  - on Pradaxa for AC   - SCDs for DVT prophylaxis       IDR Goals: Goals reviewed at interdisciplinary rounds with case management, social work, physical therapy, occupational therapy, and speech language pathology.   Please see specific therapy  notes for in depth goals.  Dispo: home      Discussed daily hospital plans and goals with patient    Discussed with Stroke Fellow Dr. Reyes

## 2023-04-29 NOTE — H&P ADULT - HISTORY OF PRESENT ILLNESS
**STROKE HPI***    HPI: 87y Female with PMHx of hypertension, atrial fibrillation (on Pradaxa, previously intolerant of other DOACs), CAD status post PCI, pulmonary sarcoidosis, asthma, TIA/stroke, and SSS status post PPM presenting to ED for episode of hypertension up to sBP 179 yesterday. While speaking with her friend on the phone last night, pt with a couple second episode of difficulty recalling a word "it was on the tip of my tongue." which quickly resolved. Pt states she had another episode of this today when speaking with ED provider, also quickly resolved. Pt otherwise denies headache, visual disturbances, weakness/numbness of extremities,       FAMILY HISTORY:  Family history of diabetes mellitus  Family history of diabetes mellitus (DM)        SOCIAL HISTORY:   Patient lives with *** at ***.   Smoking status:  Drinking:  Drug Use:     ROS: ***  Constitutional: No fever, weight loss or fatigue  Eyes: No eye pain, visual disturbances, or discharge  ENMT:  No difficulty hearing, tinnitus, vertigo; No sinus or throat pain  Neck: No pain or stiffness  Respiratory: No cough, wheezing, chills or hemoptysis  Cardiovascular: No chest pain, palpitations, shortness of breath, dizziness or leg swelling  Gastrointestinal: No abdominal pain. No nausea, vomiting or hematemesis; No diarrhea or constipation. Nohematochezia.  Genitourinary: No dysuria, frequency, hematuria or incontinence  Neurological: As per HPI  Skin: No itching, burning, rashes or lesions   Endocrine: No heat or cold intolerance; No hair loss  Musculoskeletal: No joint pain or swelling; No muscle, back or extremity pain  Psychiatric: No depression, anxiety, mood swings or difficulty sleeping  Heme/Lymph: No easy bruising or bleeding gums    T(C): 36.5 (23 @ 11:53), Max: 36.7 (23 @ 08:10)  HR: 86 (23 @ 15:42) (56 - 86)  BP: 160/74 (23 @ 15:42) (147/71 - 177/72)  RR: 19 (23 @ 15:42) (16 - 19)  SpO2: 100% (23 @ 15:42) (95% - 100%)    MEDICATION RECONCILIATION   MEDICATIONS  (STANDING):    MEDICATIONS  (PRN):    Allergies    latex (Other)  codeine (Unknown)    Intolerances      Vital Signs Last 24 Hrs  T(C): 36.5 (2023 11:53), Max: 36.7 (2023 08:10)  T(F): 97.7 (2023 11:53), Max: 98 (2023 08:10)  HR: 86 (2023 15:42) (56 - 86)  BP: 160/74 (2023 15:42) (147/71 - 177/72)  BP(mean): 107 (2023 15:42) (107 - 107)  RR: 19 (2023 15:42) (16 - 19)  SpO2: 100% (2023 15:42) (95% - 100%)    Parameters below as of 2023 15:42  Patient On (Oxygen Delivery Method): room air        Physical exam:  General: No acute distress, awake and alert  Cardiovascular: Regular rate and rhythm, no murmurs, rubs, or gallops. No bruits  Pulmonary: Anterior breath sounds clear bilaterally, no crackles or wheezing. No use of accessory muscles  GI: Abdomen soft, non-tender, non-distended    Neurologic:  -Mental status: Awake, alert, oriented to person, place, and time. Speech is fluent with intact naming, repetition, and comprehension, no dysarthria. Recent and remote memory intact. Follows commands. Attention/concentration intact. Fund of knowledge appropriate.  -Cranial nerves:   II: Visual fields are full to confrontation.  III, IV, VI: Extraocular movements are intact without nystagmus. Pupils equally round and reactive to light  V:  Facial sensation V1-V3 equal and intact   VII: Face is symmetric with normal eye closure and smile  VIII: Hearing is bilaterally intact to finger rub  IX, X: Uvula is midline and soft palate rises symmetrically  XI: Head turning and shoulder shrug are intact.  XII: Tongue protrudes midline  Motor: Normal bulk and tone. No pronator drift. Strength bilateral upper extremity 5/5, bilateral lower extremities 5/5.  Rapid alternating movements intact and symmetric  Sensation: Intact to light touch bilaterally. No neglect or extinction on double simultaneous testing.  Coordination: No dysmetria on finger-to-nose and heel-to-shin bilaterally  Reflexes: Downgoing toes bilaterally   Gait: Narrow gait and steady    NIHSS: **** ASPECT Score: *** ICH Score: *** (GCS)    Fingerstick Blood Glucose: CAPILLARY BLOOD GLUCOSE      POCT Blood Glucose.: 85 mg/dL (2023 08:17)    LABS:                        13.4   10.10 )-----------( 270      ( 2023 09:31 )             41.6         141  |  104  |  16  ----------------------------<  102<H>  4.8   |  27  |  1.09    Ca    9.2      2023 09:31        CARDIAC MARKERS ( 2023 09:31 )  x     / <0.01 ng/mL / x     / x     / x          Urinalysis Basic - ( 2023 09:22 )    Color: Yellow / Appearance: Clear / S.015 / pH: x  Gluc: x / Ketone: NEGATIVE  / Bili: Negative / Urobili: 0.2 E.U./dL   Blood: x / Protein: 30 mg/dL / Nitrite: NEGATIVE   Leuk Esterase: NEGATIVE / RBC: < 5 /HPF / WBC < 5 /HPF   Sq Epi: x / Non Sq Epi: x / Bacteria: Many /HPF        RADIOLOGY & ADDITIONAL STUDIES:    HCT:     CTA:    -----------------------------------------------------------------------------------------    ASSESSMENT/PLAN:    87y Female w/ PMH ***. HCT showed ***. CTA showed ***. Given *** tPA was ***. Patient was admitted to **** for further workup    **STROKE HPI***    HPI: 87y Female with PMHx of hypertension, atrial fibrillation (on Pradaxa, previously intolerant of other DOACs), CAD status post PCI, pulmonary sarcoidosis, asthma, TIA/stroke, and SSS status post PPM presenting to ED for episode of hypertension up to sBP 179 yesterday. While speaking with her friend on the phone last night, pt with a couple second episode of difficulty recalling a word "it was on the tip of my tongue." which quickly resolved. Pt states she had another episode of this today when speaking with ED provider, also quickly resolved. Stroke consulted for these episodes. Pt otherwise denies headache, visual disturbances, weakness/numbness of extremities, slurred speech, gait instability, dizziness.       FAMILY HISTORY:  Family history of diabetes mellitus  Family history of diabetes mellitus (DM)        SOCIAL HISTORY:   Patient lives with *** at ***.   Smoking status:  Drinking:  Drug Use:     ROS: ***  Constitutional: No fever, weight loss or fatigue  Eyes: No eye pain, visual disturbances, or discharge  ENMT:  No difficulty hearing, tinnitus, vertigo; No sinus or throat pain  Neck: No pain or stiffness  Respiratory: No cough, wheezing, chills or hemoptysis  Cardiovascular: No chest pain, palpitations, shortness of breath, dizziness or leg swelling  Gastrointestinal: No abdominal pain. No nausea, vomiting or hematemesis; No diarrhea or constipation. Nohematochezia.  Genitourinary: No dysuria, frequency, hematuria or incontinence  Neurological: As per HPI  Skin: No itching, burning, rashes or lesions   Endocrine: No heat or cold intolerance; No hair loss  Musculoskeletal: No joint pain or swelling; No muscle, back or extremity pain  Psychiatric: No depression, anxiety, mood swings or difficulty sleeping  Heme/Lymph: No easy bruising or bleeding gums    T(C): 36.5 (23 @ 11:53), Max: 36.7 (23 @ 08:10)  HR: 86 (23 @ 15:42) (56 - 86)  BP: 160/74 (23 @ 15:42) (147/71 - 177/72)  RR: 19 (23 @ 15:42) (16 - 19)  SpO2: 100% (23 @ 15:42) (95% - 100%)    MEDICATION RECONCILIATION   MEDICATIONS  (STANDING):    MEDICATIONS  (PRN):    Allergies    latex (Other)  codeine (Unknown)    Intolerances      Vital Signs Last 24 Hrs  T(C): 36.5 (2023 11:53), Max: 36.7 (2023 08:10)  T(F): 97.7 (2023 11:53), Max: 98 (2023 08:10)  HR: 86 (2023 15:42) (56 - 86)  BP: 160/74 (2023 15:42) (147/71 - 177/72)  BP(mean): 107 (2023 15:42) (107 - 107)  RR: 19 (2023 15:42) (16 - 19)  SpO2: 100% (2023 15:42) (95% - 100%)    Parameters below as of 2023 15:42  Patient On (Oxygen Delivery Method): room air        Physical exam:  General: No acute distress, awake and alert  Cardiovascular: Regular rate and rhythm, no murmurs, rubs, or gallops. No bruits  Pulmonary: Anterior breath sounds clear bilaterally, no crackles or wheezing. No use of accessory muscles  GI: Abdomen soft, non-tender, non-distended    Neurologic:  -Mental status: Awake, alert, oriented to person, place, and time. Speech is fluent with intact naming, repetition, and comprehension, no dysarthria. Recent and remote memory intact. Follows commands. Attention/concentration intact. Fund of knowledge appropriate.  -Cranial nerves:   II: Visual fields are full to confrontation.  III, IV, VI: Extraocular movements are intact without nystagmus. Pupils equally round and reactive to light  V:  Facial sensation V1-V3 equal and intact   VII: Face is symmetric with normal eye closure and smile  VIII: Hearing is bilaterally intact to finger rub  IX, X: Uvula is midline and soft palate rises symmetrically  XI: Head turning and shoulder shrug are intact.  XII: Tongue protrudes midline  Motor: Normal bulk and tone. No pronator drift. Strength bilateral upper extremity 5/5, bilateral lower extremities 5/5.  Rapid alternating movements intact and symmetric  Sensation: Intact to light touch bilaterally. No neglect or extinction on double simultaneous testing.  Coordination: No dysmetria on finger-to-nose and heel-to-shin bilaterally  Reflexes: Downgoing toes bilaterally   Gait: Narrow gait and steady    NIHSS: **** ASPECT Score: *** ICH Score: *** (GCS)    Fingerstick Blood Glucose: CAPILLARY BLOOD GLUCOSE      POCT Blood Glucose.: 85 mg/dL (2023 08:17)    LABS:                        13.4   10.10 )-----------( 270      ( 2023 09:31 )             41.6         141  |  104  |  16  ----------------------------<  102<H>  4.8   |  27  |  1.09    Ca    9.2      2023 09:31        CARDIAC MARKERS ( 2023 09:31 )  x     / <0.01 ng/mL / x     / x     / x          Urinalysis Basic - ( 2023 09:22 )    Color: Yellow / Appearance: Clear / S.015 / pH: x  Gluc: x / Ketone: NEGATIVE  / Bili: Negative / Urobili: 0.2 E.U./dL   Blood: x / Protein: 30 mg/dL / Nitrite: NEGATIVE   Leuk Esterase: NEGATIVE / RBC: < 5 /HPF / WBC < 5 /HPF   Sq Epi: x / Non Sq Epi: x / Bacteria: Many /HPF        RADIOLOGY & ADDITIONAL STUDIES:    HCT:     CTA:    -----------------------------------------------------------------------------------------    ASSESSMENT/PLAN:    87y Female w/ PMH ***. HCT showed ***. CTA showed ***. Given *** tPA was ***. Patient was admitted to **** for further workup    **STROKE HPI***    HPI: 87y Female with PMHx of hypertension, atrial fibrillation (on Pradaxa, previously intolerant of other DOACs), CAD status post PCI, pulmonary sarcoidosis, asthma, TIA/stroke, and SSS status post PPM presenting to ED for episode of hypertension up to sBP 179 yesterday. While speaking with her friend on the phone last night, pt with a couple second episode of difficulty recalling a word "it was on the tip of my tongue." which quickly resolved. Pt states she had another episode of this today when speaking with ED provider, also quickly resolved. Stroke consulted for these episodes. Pt otherwise denies headache, visual disturbances, weakness/numbness of extremities, slurred speech, gait instability, dizziness.   **STROKE HPI***    HPI: 87y Female with PMHx of hypertension, atrial fibrillation (on Pradaxa, previously intolerant of other DOACs), CAD status post PCI, pulmonary sarcoidosis, asthma, TIA/stroke, and SSS status post PPM presenting to ED for episode of hypertension up to sBP 179 yesterday. While speaking with her friend on the phone last night, pt with a couple second episode of difficulty recalling a word "it was on the tip of my tongue." which quickly resolved. At this time, patient took 2 baby aspirins. Pt states she had another episode of this today when speaking with ED provider, also quickly resolved. Stroke consulted for these episodes. Pt otherwise denies headache, visual disturbances, weakness/numbness of extremities, slurred speech, gait instability, dizziness.

## 2023-04-29 NOTE — ED ADULT NURSE NOTE - OBJECTIVE STATEMENT
.  87years female alert mental state (AOX3) received on foot.  pt is ambulatory herself.    -complain of HTN.  Hx of TIA, HTN, CHF. pt has HTN, headache, abdomen pain, and weakness fore several days.   -denied chest pain, SOB, fever, n/v/d.  Pt is in the bed comfortably at this time. Will continue to monitor and document any changes.

## 2023-04-29 NOTE — ED PROVIDER NOTE - CLINICAL SUMMARY MEDICAL DECISION MAKING FREE TEXT BOX
Pt c/o high bp despite meds, L ha, R clogged ear, burning in throat and chest discomfort, word finding difficulty x 3 d w/o neuro deficits on exam.  Suspect gerd rather than cardiac etiology for pt's throat/chest pain.  EKG w nonspecific changes w/o st elevation or depression; trop neg.  GI meds ordered.  Pt w ? bp related sx, ? cva/tia but sx x 3d - no stroke code; ct/cta's ordered.  Will monitor bp, consider meds.  Reassess. See progress notes for further mdm related documentation.

## 2023-04-29 NOTE — H&P ADULT - NSHPREVIEWOFSYSTEMS_GEN_ALL_CORE
ROS:   Constitutional: No fever, weight loss or fatigue  Eyes: No eye pain, visual disturbances, or discharge  ENMT:  No difficulty hearing, tinnitus, vertigo; No sinus or throat pain  Neck: No pain or stiffness  Respiratory: No cough, wheezing, chills or hemoptysis  Cardiovascular: No chest pain, palpitations, shortness of breath, dizziness  Gastrointestinal: No abdominal pain. No nausea, vomiting   Genitourinary: No dysuria, frequency, hematuria or incontinence  Neurological: As per HPI

## 2023-04-29 NOTE — H&P ADULT - NSHPSOCIALHISTORY_GEN_ALL_CORE
SOCIAL HISTORY:   Patient lives alone with assistant   Smoking status: denies  Drinking: denies  Drug Use: denies

## 2023-04-29 NOTE — ED PROVIDER NOTE - PHYSICAL EXAMINATION
VITAL SIGNS: I have reviewed nursing notes and confirm.  CONSTITUTIONAL:  in no acute distress.   SKIN:  warm and dry, no acute rash.   HEAD:  normocephalic, atraumatic.  EYES: PERRL, EOM intact; conjunctiva and sclera clear.  ENT: No nasal discharge; airway clear. TM, canal nl bilat  NECK: Supple; non tender. OP benign  CARD: S1, S2 normal; no murmurs, gallops, or rubs. Regular rate and rhythm.   RESP:  Clear to auscultation b/l, no wheezes, rales or rhonchi.  ABD: Normal bowel sounds; soft; non-distended; non-tender; no guarding/ rebound.  MSK: Normal ROM. No clubbing, cyanosis or edema. no ttp bilat le  NEURO: awake, alert, oriented x 3, CN II-XII grossly intact, motor 5/5, no gross sens deficits, speech clear, occ poor recall of events/timing, reports word finding difficulty  PSYCH: Cooperative, mood and affect appropriate.

## 2023-04-29 NOTE — PATIENT PROFILE ADULT - FALL HARM RISK - FACTORS
Release for me to speak with human resources for her Return to work program with occupational support (She will sign release on Friday AM, she will have the occ health information -RS).     Get fasting labs completed (Fasting labs Friday am -RS)  Follow-up visit in one month may be virtual (scheduled -RS)   
Other

## 2023-04-29 NOTE — ED PROVIDER NOTE - CARE PLAN
Principal Discharge DX:	Word finding problem  Secondary Diagnosis:	HTN (hypertension)  Secondary Diagnosis:	GERD (gastroesophageal reflux disease)   1

## 2023-04-30 VITALS
DIASTOLIC BLOOD PRESSURE: 82 MMHG | HEART RATE: 79 BPM | RESPIRATION RATE: 20 BRPM | SYSTOLIC BLOOD PRESSURE: 145 MMHG | OXYGEN SATURATION: 91 %

## 2023-04-30 DIAGNOSIS — D86.9 SARCOIDOSIS, UNSPECIFIED: ICD-10-CM

## 2023-04-30 DIAGNOSIS — I48.91 UNSPECIFIED ATRIAL FIBRILLATION: ICD-10-CM

## 2023-04-30 DIAGNOSIS — I10 ESSENTIAL (PRIMARY) HYPERTENSION: ICD-10-CM

## 2023-04-30 DIAGNOSIS — E78.5 HYPERLIPIDEMIA, UNSPECIFIED: ICD-10-CM

## 2023-04-30 LAB
A1C WITH ESTIMATED AVERAGE GLUCOSE RESULT: 5.6 % — SIGNIFICANT CHANGE UP (ref 4–5.6)
ANION GAP SERPL CALC-SCNC: 11 MMOL/L — SIGNIFICANT CHANGE UP (ref 5–17)
BUN SERPL-MCNC: 12 MG/DL — SIGNIFICANT CHANGE UP (ref 7–23)
CALCIUM SERPL-MCNC: 9.3 MG/DL — SIGNIFICANT CHANGE UP (ref 8.4–10.5)
CHLORIDE SERPL-SCNC: 104 MMOL/L — SIGNIFICANT CHANGE UP (ref 96–108)
CHOLEST SERPL-MCNC: 152 MG/DL — SIGNIFICANT CHANGE UP
CO2 SERPL-SCNC: 27 MMOL/L — SIGNIFICANT CHANGE UP (ref 22–31)
CREAT SERPL-MCNC: 1.15 MG/DL — SIGNIFICANT CHANGE UP (ref 0.5–1.3)
EGFR: 46 ML/MIN/1.73M2 — LOW
ESTIMATED AVERAGE GLUCOSE: 114 MG/DL — SIGNIFICANT CHANGE UP (ref 68–114)
GLUCOSE SERPL-MCNC: 82 MG/DL — SIGNIFICANT CHANGE UP (ref 70–99)
HCT VFR BLD CALC: 39.2 % — SIGNIFICANT CHANGE UP (ref 34.5–45)
HDLC SERPL-MCNC: 45 MG/DL — LOW
HGB BLD-MCNC: 12.6 G/DL — SIGNIFICANT CHANGE UP (ref 11.5–15.5)
LIPID PNL WITH DIRECT LDL SERPL: 89 MG/DL — SIGNIFICANT CHANGE UP
MAGNESIUM SERPL-MCNC: 2.2 MG/DL — SIGNIFICANT CHANGE UP (ref 1.6–2.6)
MCHC RBC-ENTMCNC: 30.2 PG — SIGNIFICANT CHANGE UP (ref 27–34)
MCHC RBC-ENTMCNC: 32.1 GM/DL — SIGNIFICANT CHANGE UP (ref 32–36)
MCV RBC AUTO: 94 FL — SIGNIFICANT CHANGE UP (ref 80–100)
NON HDL CHOLESTEROL: 107 MG/DL — SIGNIFICANT CHANGE UP
NRBC # BLD: 0 /100 WBCS — SIGNIFICANT CHANGE UP (ref 0–0)
PHOSPHATE SERPL-MCNC: 3.7 MG/DL — SIGNIFICANT CHANGE UP (ref 2.5–4.5)
PLATELET # BLD AUTO: 243 K/UL — SIGNIFICANT CHANGE UP (ref 150–400)
POTASSIUM SERPL-MCNC: 4.3 MMOL/L — SIGNIFICANT CHANGE UP (ref 3.5–5.3)
POTASSIUM SERPL-SCNC: 4.3 MMOL/L — SIGNIFICANT CHANGE UP (ref 3.5–5.3)
RBC # BLD: 4.17 M/UL — SIGNIFICANT CHANGE UP (ref 3.8–5.2)
RBC # FLD: 14 % — SIGNIFICANT CHANGE UP (ref 10.3–14.5)
SODIUM SERPL-SCNC: 142 MMOL/L — SIGNIFICANT CHANGE UP (ref 135–145)
T4 FREE SERPL-MCNC: 1.32 NG/DL — SIGNIFICANT CHANGE UP (ref 0.93–1.7)
TRIGL SERPL-MCNC: 91 MG/DL — SIGNIFICANT CHANGE UP
TSH SERPL-MCNC: 5.97 UIU/ML — HIGH (ref 0.27–4.2)
WBC # BLD: 5.74 K/UL — SIGNIFICANT CHANGE UP (ref 3.8–10.5)
WBC # FLD AUTO: 5.74 K/UL — SIGNIFICANT CHANGE UP (ref 3.8–10.5)

## 2023-04-30 PROCEDURE — 84100 ASSAY OF PHOSPHORUS: CPT

## 2023-04-30 PROCEDURE — 93005 ELECTROCARDIOGRAM TRACING: CPT

## 2023-04-30 PROCEDURE — 96374 THER/PROPH/DIAG INJ IV PUSH: CPT

## 2023-04-30 PROCEDURE — 97161 PT EVAL LOW COMPLEX 20 MIN: CPT

## 2023-04-30 PROCEDURE — 95716 VEEG EA 12-26HR CONT MNTR: CPT

## 2023-04-30 PROCEDURE — 84443 ASSAY THYROID STIM HORMONE: CPT

## 2023-04-30 PROCEDURE — 84439 ASSAY OF FREE THYROXINE: CPT

## 2023-04-30 PROCEDURE — 87635 SARS-COV-2 COVID-19 AMP PRB: CPT

## 2023-04-30 PROCEDURE — 80048 BASIC METABOLIC PNL TOTAL CA: CPT

## 2023-04-30 PROCEDURE — 95720 EEG PHY/QHP EA INCR W/VEEG: CPT

## 2023-04-30 PROCEDURE — 81001 URINALYSIS AUTO W/SCOPE: CPT

## 2023-04-30 PROCEDURE — 80061 LIPID PANEL: CPT

## 2023-04-30 PROCEDURE — 70450 CT HEAD/BRAIN W/O DYE: CPT | Mod: MA

## 2023-04-30 PROCEDURE — 99232 SBSQ HOSP IP/OBS MODERATE 35: CPT

## 2023-04-30 PROCEDURE — 70496 CT ANGIOGRAPHY HEAD: CPT | Mod: MA

## 2023-04-30 PROCEDURE — 71046 X-RAY EXAM CHEST 2 VIEWS: CPT

## 2023-04-30 PROCEDURE — 70498 CT ANGIOGRAPHY NECK: CPT | Mod: MA

## 2023-04-30 PROCEDURE — 82962 GLUCOSE BLOOD TEST: CPT

## 2023-04-30 PROCEDURE — 36415 COLL VENOUS BLD VENIPUNCTURE: CPT

## 2023-04-30 PROCEDURE — 84484 ASSAY OF TROPONIN QUANT: CPT

## 2023-04-30 PROCEDURE — 95700 EEG CONT REC W/VID EEG TECH: CPT

## 2023-04-30 PROCEDURE — 83036 HEMOGLOBIN GLYCOSYLATED A1C: CPT

## 2023-04-30 PROCEDURE — 99285 EMERGENCY DEPT VISIT HI MDM: CPT | Mod: 25

## 2023-04-30 PROCEDURE — 83735 ASSAY OF MAGNESIUM: CPT

## 2023-04-30 PROCEDURE — 85027 COMPLETE CBC AUTOMATED: CPT

## 2023-04-30 RX ORDER — LISINOPRIL 2.5 MG/1
5 TABLET ORAL DAILY
Refills: 0 | Status: DISCONTINUED | OUTPATIENT
Start: 2023-04-30 | End: 2023-04-30

## 2023-04-30 RX ORDER — HYDRALAZINE HCL 50 MG
5 TABLET ORAL ONCE
Refills: 0 | Status: COMPLETED | OUTPATIENT
Start: 2023-04-30 | End: 2023-04-30

## 2023-04-30 RX ADMIN — DABIGATRAN ETEXILATE MESYLATE 150 MILLIGRAM(S): 150 CAPSULE ORAL at 05:55

## 2023-04-30 RX ADMIN — LISINOPRIL 5 MILLIGRAM(S): 2.5 TABLET ORAL at 12:03

## 2023-04-30 RX ADMIN — Medication 75 MICROGRAM(S): at 05:56

## 2023-04-30 RX ADMIN — Medication 5 MILLIGRAM(S): at 09:46

## 2023-04-30 RX ADMIN — Medication 75 MILLIGRAM(S): at 05:56

## 2023-04-30 NOTE — DISCHARGE NOTE PROVIDER - CARE PROVIDER_API CALL
Jolene Rick)  Neurology; Vascular Neurology  [address], [address] [place] [address]  Phone: [phone]  Fax: [phone]  Follow Up Time:

## 2023-04-30 NOTE — OCCUPATIONAL THERAPY INITIAL EVALUATION ADULT - ADDITIONAL COMMENTS
per pt. she lives alone on the ground floor of an apartment building, she reports having a spiral staircase  in her apartment. She uses a SC for community mobility, and is independent in ADLs/IADLS prior. She is R handed and reports decreased vision L>R eye

## 2023-04-30 NOTE — DISCHARGE NOTE PROVIDER - NSDCFUSCHEDAPPT_GEN_ALL_CORE_FT
[facility]ician [facility], [facility]  [facility] [address]  Scheduled Appointment: 05/05/2023    Leah Riggs  [facility]  P[facility] [address]  Scheduled Appointment: 05/30/2023

## 2023-04-30 NOTE — EEG REPORT - NS EEG TEXT BOX
[facility] Department of Neurology  Inpatient Continuous video-Electroencephalogram    Patient Name:	DAVIN VALLEJO    :	-  MRN:	-    Study Start Date/Time:  2023, 4:41:49 PM  Study End Date/Time:    Referred by: Jolene Rikc MD    Brief Clinical History:  DAVIN VALLEJO is a - old -; study performed to investigate for seizures or markers of epilepsy.    Diagnosis Code:   R56.9 convulsions/seizure  The live video was: unmonitored.    Pertinent Medications:  n/a    Acquisition Details:  Electroencephalography was acquired using a minimum of 21 channels on an [employer] system v 9.3.1 with electrode placement according to the standard International 10-20 system following ACNS (American Clinical Neurophysiology Society) guidelines for Long-Term Video EEG monitoring.  Anterior temporal T1 and T2 electrodes were utilized whenever possible.   The XLTEK automated spike & seizure detections were all reviewed in detail, in addition to extensive portions of raw EEG.      Day 1: 2023 @ 4:41:49 PM to next morning @ 07:00 am  Background:  continuous, with predominantly alpha and beta frequencies.  Symmetry:  No persistent asymmetries of voltage or frequency.  Posterior Dominant Rhythm:  9 Hz symmetric, well-organized, and well-modulated.  Organization: Appropriate anterior-posterior gradient.  Voltage:  Normal (20+ uV)  Variability: Yes. 		Reactivity: Yes.  N2 sleep: Symmetric, synchronous spindles and K complexes.  Spontaneous Activity:  No epileptiform discharges.  Periodic/rhythmic activity:  None  Events:  No electrographic seizures or significant clinical events.  Provocations:  Hyperventilation and Photic stimulation: was not performed.    Daily Summary:    No epileptiform activity and no significant clinical events occurred.    Rupali Osorio MD  Attending Neurologist, [facility]     [facility] Department of Neurology  Inpatient Continuous video-Electroencephalogram    Patient Name:	DAVIN VALLEJO    :	1935  MRN:	[id number]    Study Start Date/Time:  2023, 4:41:49 PM  Study End Date/Time: 2023, 11:43 AM    Referred by: Jolene Rick MD    Brief Clinical History:  DAVIN VALLEJO is a - old -; study performed to investigate for seizures or markers of epilepsy.    Diagnosis Code:   R56.9 convulsions/seizure  The live video was: unmonitored.    Pertinent Medications:  n/a    Acquisition Details:  Electroencephalography was acquired using a minimum of 21 channels on an [employer] system v 9.3.1 with electrode placement according to the standard International 10-20 system following ACNS (American Clinical Neurophysiology Society) guidelines for Long-Term Video EEG monitoring.  Anterior temporal T1 and T2 electrodes were utilized whenever possible.   The XLTEK automated spike & seizure detections were all reviewed in detail, in addition to extensive portions of raw EEG.      Day 1: 2023 @ 4:41:49 PM to next morning @ 11:43 AM  Background:  continuous, with predominantly alpha and beta frequencies.  Symmetry:  No persistent asymmetries of voltage or frequency.  Posterior Dominant Rhythm:  9 Hz symmetric, well-organized, and well-modulated.  Organization: Appropriate anterior-posterior gradient.  Voltage:  Normal (20+ uV)  Variability: Yes. 		Reactivity: Yes.  N2 sleep: Symmetric, synchronous spindles and K complexes.  Spontaneous Activity:  No epileptiform discharges.  Periodic/rhythmic activity:  None  Events:  No electrographic seizures or significant clinical events.  Provocations:  Hyperventilation and Photic stimulation: was not performed.        FINAL Summary:  Normal continuous av-EEG study.  1)	The EEG remained normal throughout the study.        Final Clinical Correlation:  There were no findings of active epilepsy, however this alone does not rule out the diagnosis.       Rupali Osorio MD  Attending Neurologist, [facility]

## 2023-04-30 NOTE — PHYSICAL THERAPY INITIAL EVALUATION ADULT - GENERAL OBSERVATIONS, REHAB EVAL
Pt received/left semi-supine in bed +EEG +tele, cleared for PT by RN, agreeable to PT Eval. +OT Fidel present for OT IE.

## 2023-04-30 NOTE — DISCHARGE NOTE PROVIDER - HOSPITAL COURSE
Hospital course:  87y Female with PMHx of hypertension, atrial fibrillation (on Pradaxa, previously intolerant of other DOACs 2/2 to bleeding/easy bruisability), hypothyroid, CAD status post PCI, pulmonary sarcoidosis, asthma, TIA/stroke, and SSS status post PPM presenting to ED for episode of hypertension, max  yesterday. While speaking with her friend on the phone 2 nights ago, pt with a couple second episode of difficulty recalling words, states "it was on the tip of my tongue." Notes symptoms then spontaneously resolved. Pt states she had another episode word finding difficulty when speaking with ED provider which also quickly resolved. Stroke consulted for these episodes. CTH with old infarcts in R frontal, L parietal, and L cerebellum however no acute infarct noted. CTA H/N with no LVO or HGS. Admitted to stroke for further w/u. EEG placed to r/o seizure. PPM noted to not be MR compatible, repeat CT _.     During this hospital course, patient had a (ischemic/hemorrhagic) stroke located in (left/right.....) as seen on (MRI/CT).   The stroke etiology is likely secondary to:  []atrial fibrillation  []small vessel disease from atherosclerotic risk factors  []other:  []etiology workup still in progress    Patient had the following workup done in house:  CT Head No Cont (04.29.23 @ 11:40)   IMPRESSION:  No acute intracranial findings.  No change from August 2019; chronic right frontal, left parietal, and   left cerebellar infarcts.    CT Angio Head and Neck w/ IV Cont (04.29.23 @ 11:41)   IMPRESSION:  No arterial occlusion or high grade stenosis on CTA head or neck. No   significant change since October 2014.    EEG:     Repeat CTH:    [x]labs  A1C  TSH   LDL    Physical exam at discharge:  -Mental status: Awake, alert, oriented to person, place, and time. Speech is fluent with intact naming, repetition, and comprehension, no dysarthria. Recent and remote memory intact. Follows commands. Attention/concentration intact. Fund of knowledge appropriate.  -Cranial nerves:   II: Visual fields are full to confrontation.  III, IV, VI: Extraocular movements are intact without nystagmus.   V:  Facial sensation V1-V3 equal and intact   VII: Face is symmetric with normal eye closure and smile  VIII: Hearing is bilaterally intact to finger rub  IX, X: Uvula is midline and soft palate rises symmetrically  XI: Head turning and shoulder shrug are intact.  XII: Tongue protrudes midline  Motor: Normal bulk and tone. No pronator drift. Strength bilateral upper extremity 5/5, bilateral lower extremities 5/5.  Sensation: Intact to light touch bilaterally. No neglect or extinction on double simultaneous testing.  Coordination: No dysmetria on finger-to-nose and heel-to-shin bilaterally  Reflexes: Downgoing toes bilaterally   Gait: Narrow gait and steady     Discharge NIHSS:    New medications on discharge: N/A  Labs to be followed up: N/A  Imaging to be done as outpatient: N/A  Further outpatient workup: ECHO   Hospital course:  87y Female with PMHx of hypertension, atrial fibrillation (on Pradaxa, previously intolerant of other DOACs 2/2 to bleeding/easy bruisability), hypothyroid, CAD status post PCI, pulmonary sarcoidosis, asthma, TIA/stroke, and SSS status post PPM presenting to ED for episode of hypertension, max  yesterday. While speaking with her friend on the phone 2 nights ago, pt with a couple second episode of difficulty recalling words, states "it was on the tip of my tongue." Notes symptoms then spontaneously resolved. Pt states she had another episode word finding difficulty when speaking with ED provider which also quickly resolved. Stroke consulted for these episodes. CTH with old infarcts in R frontal, L parietal, and L cerebellum however no acute infarct noted. CTA H/N with no LVO or HGS. Admitted to stroke for further w/u. EEG placed to r/o seizure, negative for epileptiform activity. PPM noted to not be MR compatible, repeat CT _.       Patient had the following workup done in house:  CT Head No Cont (04.29.23 @ 11:40)   IMPRESSION:  No acute intracranial findings.  No change from August 2019; chronic right frontal, left parietal, and   left cerebellar infarcts.    CT Angio Head and Neck w/ IV Cont (04.29.23 @ 11:41)   IMPRESSION:  No arterial occlusion or high grade stenosis on CTA head or neck. No   significant change since October 2014.    EEG: Negative for epileptiform activity    Repeat CTH:    [x]labs  A1C - 5.6%  TSH - 5.970  LDL - 89    Physical exam at discharge:  -Mental status: Awake, alert, oriented to person, place, and time. Speech is fluent with intact naming, repetition, and comprehension, no dysarthria. Recent and remote memory intact. Follows commands. Attention/concentration intact. Fund of knowledge appropriate.  -Cranial nerves:   II: Visual fields are full to confrontation.  III, IV, VI: Extraocular movements are intact without nystagmus.   V:  Facial sensation V1-V3 equal and intact   VII: Face is symmetric with normal eye closure and smile  VIII: Hearing is bilaterally intact to finger rub  IX, X: Uvula is midline and soft palate rises symmetrically  XI: Head turning and shoulder shrug are intact.  XII: Tongue protrudes midline  Motor: Normal bulk and tone. No pronator drift. Strength bilateral upper extremity 5/5, bilateral lower extremities 5/5.  Sensation: Intact to light touch bilaterally. No neglect or extinction on double simultaneous testing.  Coordination: No dysmetria on finger-to-nose and heel-to-shin bilaterally  Reflexes: Downgoing toes bilaterally   Gait: Narrow gait and steady     Discharge NIHSS: 0    New medications on discharge: N/A  Labs to be followed up: N/A  Imaging to be done as outpatient: N/A  Further outpatient workup: ECHO   Hospital course:  87y Female with PMHx of hypertension, atrial fibrillation (on Pradaxa, previously intolerant of other DOACs 2/2 to bleeding/easy bruisability), hypothyroid, CAD status post PCI, pulmonary sarcoidosis, asthma, TIA/stroke, and SSS status post PPM presenting to ED for episode of hypertension, max  yesterday. While speaking with her friend on the phone 2 nights ago, pt with a couple second episode of difficulty recalling words, states "it was on the tip of my tongue." Notes symptoms then spontaneously resolved. Pt states she had another episode word finding difficulty when speaking with ED provider which also quickly resolved. Stroke consulted for these episodes. CTH with old infarcts in R frontal, L parietal, and L cerebellum however no acute infarct noted. CTA H/N with no LVO or HGS. Admitted to stroke for further w/u. EEG placed to r/o seizure, negative for epileptiform activity. PPM noted to not be MR compatible.    Patient adament to leave hospital AMA on 4/20, regardless of SBP blood pressures elevated to 190's. Risks of bleeding while on AC with elevated blood pressure explained to patient, including death. Patient symtpopmms may have been 2/2 TIA and further work up is recommended including cardiac imaging and repeat HCT (patient refused repeat HCT AM on 4/20). Risk of recurrent stroke explained to patient. Patient signed AMA paperwork and left hospital.       Patient had the following workup done in house:  CT Head No Cont (04.29.23 @ 11:40)   IMPRESSION:  No acute intracranial findings.  No change from August 2019; chronic right frontal, left parietal, and   left cerebellar infarcts.    CT Angio Head and Neck w/ IV Cont (04.29.23 @ 11:41)   IMPRESSION:  No arterial occlusion or high grade stenosis on CTA head or neck. No   significant change since October 2014.    EEG: Negative for epileptiform activity    Repeat CTH: refused    [x]labs  A1C - 5.6%  TSH - 5.970  LDL - 89    Physical exam at discharge:  -Mental status: Awake, alert, oriented to person, place, and time. Speech is fluent with intact naming, repetition, and comprehension, no dysarthria. Recent and remote memory intact. Follows commands. Attention/concentration intact. Fund of knowledge appropriate.  -Cranial nerves:   II: Visual fields are full to confrontation.  III, IV, VI: Extraocular movements are intact without nystagmus.   V:  Facial sensation V1-V3 equal and intact   VII: Face is symmetric with normal eye closure and smile  VIII: Hearing is bilaterally intact to finger rub  IX, X: Uvula is midline and soft palate rises symmetrically  XI: Head turning and shoulder shrug are intact.  XII: Tongue protrudes midline  Motor: Normal bulk and tone. No pronator drift. Strength bilateral upper extremity 5/5, bilateral lower extremities 5/5.  Sensation: Intact to light touch bilaterally. No neglect or extinction on double simultaneous testing.  Coordination: No dysmetria on finger-to-nose and heel-to-shin bilaterally  Reflexes: Downgoing toes bilaterally   Gait: Narrow gait and steady     Discharge NIHSS: 0    New medications on discharge: N/A  Labs to be followed up: N/A  Imaging to be done as outpatient: N/A  Further outpatient workup: ECHO

## 2023-04-30 NOTE — OCCUPATIONAL THERAPY INITIAL EVALUATION ADULT - DIAGNOSIS, OT EVAL
Pt. admitted to [facility] for further workup after presenting w. transient episodes of aphasia. Upon assessment, pt. demo slight impairments in balance, strength and activity tolerance.

## 2023-04-30 NOTE — PROGRESS NOTE ADULT - PROBLEM/PLAN-3
Addended by: BRET DYKES on: 9/22/2022 04:05 PM     Modules accepted: Orders    
DISPLAY PLAN FREE TEXT

## 2023-04-30 NOTE — PROGRESS NOTE ADULT - ASSESSMENT
87y Female with PMHx of hypertension, atrial fibrillation (on Pradaxa, previously intolerant of other DOACs), hypothyroid, CAD status post PCI, pulmonary sarcoidosis, asthma, TIA/stroke, and SSS status post PPM presenting to ED for episode of hypertension up to sBP 179 yesterday. While speaking with her friend on the phone last night, pt with a couple second episode of difficulty recalling a word "it was on the tip of my tongue." which quickly resolved. Pt states she had another episode of this today when speaking with ED provider, also quickly resolved. Stroke consulted for these episodes. Pt otherwise denies headache, visual disturbances, weakness/numbness of extremities, slurred speech, gait instability, dizziness.

## 2023-04-30 NOTE — OCCUPATIONAL THERAPY INITIAL EVALUATION ADULT - MODALITIES TREATMENT COMMENTS
Cranial Nerves II - XII: II: PERRLA; visual fields are full to confrontation III, IV, VI: EOMI, no nystagmus appreciated V: facial sensation intact to light touch V1-V3 b/l VII: no ptosis, no facial droop, symmetric eyebrow raise and closure VIII: hearing intact to finger rub b/l  XI: head turning and shoulder shrug intact b/l XII: tongue protrusion midline, Visual Quadrant test-WFL, Vision H test- smooth b/l pursuits noted

## 2023-04-30 NOTE — PHYSICAL THERAPY INITIAL EVALUATION ADULT - DIAGNOSIS, PT EVAL
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

## 2023-04-30 NOTE — PROGRESS NOTE ADULT - SUBJECTIVE AND OBJECTIVE BOX
Interval Events: Reviewed  Patient seen and examined at bedside.    Patient is a 87y old  Female who presents with a chief complaint of difficulty with recall (2023 00:32)  no acute event overnight, RA 91%, deferred CT scan head, had it done already per pt      PAST MEDICAL & SURGICAL HISTORY:  Diverticulosis of large intestine  Diverticulosis      Sarcoidosis  Sarcoidosis      Essential hypertension  HTN (hypertension)      Transient cerebral ischemia  TIA (transient ischemic attack)      Atherosclerosis of coronary artery  CAD (coronary artery disease)      Cataract  Cataract      Status post percutaneous transluminal coronary angioplasty  s/p       Presence of cardiac pacemaker  s/p           MEDICATIONS:  Pulmonary:    Antimicrobials:    Anticoagulants:  dabigatran 150 milliGRAM(s) Oral every 12 hours    Cardiac:  lisinopril 5 milliGRAM(s) Oral daily  metoprolol succinate ER 75 milliGRAM(s) Oral every 12 hours      Allergies    latex (Other)  codeine (Unknown)    Intolerances        Vital Signs Last 24 Hrs  T(C): 36.9 (2023 10:21), Max: 36.9 (2023 04:30)  T(F): 98.5 (2023 10:21), Max: 98.5 (2023 04:30)  HR: 79 (2023 12:10) (54 - 89)  BP: 145/82 (2023 12:10) (133/61 - 192/80)  BP(mean): 103 (2023 12:10) (87 - 115)  RR: 20 (2023 12:10) (17 - 24)  SpO2: 91% (2023 12:10) (91% - 100%)    Parameters below as of 2023 10:54  Patient On (Oxygen Delivery Method): room air         @ :  -   @ 07:00  --------------------------------------------------------  IN: 0 mL / OUT: 400 mL / NET: -400 mL     @ 07: @ 12:12  --------------------------------------------------------  IN: 236 mL / OUT: 0 mL / NET: 236 mL          Review of Systems:   •	General: negative  •	Skin/Breast: negative  •	Ophthalmologic: negative  •	ENMT: negative  •	Respiratory and Thorax: negative  •	Cardiovascular: negative  •	Gastrointestinal: negative  •	Genitourinary: negative  •	Musculoskeletal: negative  •	Neurological: negative  •	Psychiatric: negative  •	Hematology/Lymphatics: negative  •	Endocrine: negative  •	Allergic/Immunologic: negative    Physical Exam:   • Constitutional:	elderly female, NAD   • Eyes:	EOMI; PERRL; no drainage or redness  • ENMT:	No oral lesions; no gross abnormalities  • Neck	 no thyromegaly or nodules  • Breasts:	not examined  • Back:	No deformity or limitation of movement  • Respiratory:	Breath Sounds equal & clear to auscultation, no accessory muscle use  • Cardiovascular:	Regular rate & rhythm, no murmurs, gallops or rubs; no S3, S4  • Gastrointestinal:	Soft, non-tender, no hepatosplenomegaly, normal bowel sounds  • Genitourinary:	not examined  • Rectal: not examined  • Extremities:	No cyanosis, clubbing or edema  • Vascular:	Equal and normal pulses (dorsalis pedis)  • Neurologica:l	not examined  • Skin:	No lesions; no rash  • Lymph Nodes:	No lymphadedenopathy  • Musculoskeletal:	No joint pain, swelling or deformity; no limitation of movement        LABS:      CBC Full  -  ( 2023 06:18 )  WBC Count : 5.74 K/uL  RBC Count : 4.17 M/uL  Hemoglobin : 12.6 g/dL  Hematocrit : 39.2 %  Platelet Count - Automated : 243 K/uL  Mean Cell Volume : 94.0 fl  Mean Cell Hemoglobin : 30.2 pg  Mean Cell Hemoglobin Concentration : 32.1 gm/dL  Auto Neutrophil # : x  Auto Lymphocyte # : x  Auto Monocyte # : x  Auto Eosinophil # : x  Auto Basophil # : x  Auto Neutrophil % : x  Auto Lymphocyte % : x  Auto Monocyte % : x  Auto Eosinophil % : x  Auto Basophil % : x        142  |  104  |  12  ----------------------------<  82  4.3   |  27  |  1.15    Ca    9.3      2023 06:18  Phos  3.7     04-30  Mg     2.2     04-30            Urinalysis Basic - ( 2023 09:22 )    Color: Yellow / Appearance: Clear / S.015 / pH: x  Gluc: x / Ketone: NEGATIVE  / Bili: Negative / Urobili: 0.2 E.U./dL   Blood: x / Protein: 30 mg/dL / Nitrite: NEGATIVE   Leuk Esterase: NEGATIVE / RBC: < 5 /HPF / WBC < 5 /HPF   Sq Epi: x / Non Sq Epi: x / Bacteria: Many /HPF                  RADIOLOGY & ADDITIONAL STUDIES (The following images were personally reviewed):  Marin:                                     No  Urine output:                       adequate  DVT prophylaxis:                 Yes  Flattus:                                  Yes  Bowel movement:              No

## 2023-04-30 NOTE — OCCUPATIONAL THERAPY INITIAL EVALUATION ADULT - PERTINENT HX OF CURRENT PROBLEM, REHAB EVAL
87y Female with PMHx of hypertension, atrial fibrillation (on Pradaxa, previously intolerant of other DOACs), CAD status post PCI, pulmonary sarcoidosis, asthma, TIA/stroke, and SSS status post PPM presenting to ED for episode of hypertension up to sBP 179 yesterday. While speaking with her friend on the phone last night, pt with a couple second episode of difficulty recalling a word "it was on the tip of my tongue." which quickly resolved. At this time, patient took 2 baby aspirins. Pt states she had another episode of this today when speaking with ED provider, also quickly resolved. Stroke consulted for these episodes.

## 2023-04-30 NOTE — OCCUPATIONAL THERAPY INITIAL EVALUATION ADULT - GENERAL OBSERVATIONS, REHAB EVAL
VIVIANA Brooks clearing pt. for OOB. Pt. received semi-supine in bed, +vEEG, +tele, +heplock in NAD agreeable to session

## 2023-04-30 NOTE — DISCHARGE NOTE PROVIDER - NSDCFUADDAPPT_GEN_ALL_CORE_FT
You will receive a call from the neurology office to set up an outpatient follow up appointment. If you do not receive a call within one week of discharge, please call [phone] and ask to make an appointment with a provider from the stroke team.    Follow up with your PCP within 2 weeks of discharge.

## 2023-04-30 NOTE — PHYSICAL THERAPY INITIAL EVALUATION ADULT - ADDITIONAL COMMENTS
ambulates with cane outdoors only, no Assistive Device inside apt. independent with all mobility and ADLs at baseline.

## 2023-04-30 NOTE — DISCHARGE NOTE PROVIDER - NSDCMRMEDTOKEN_GEN_ALL_CORE_FT
atorvastatin 80 mg oral tablet: 1 orally  Flonase 50 mcg/inh nasal spray: 1 spray(s) nasal once a day  fluticasone-salmeterol 500 mcg-50 mcg inhalation powder: 1 puff(s) inhaled 2 times a day  levothyroxine 75 mcg (0.075 mg) oral capsule: 1 orally  Metoprolol Succinate ER 50 mg oral tablet, extended release: 1.5 tab(s) orally 2 times a day  Pradaxa 150 mg oral capsule: 1 cap(s) orally 2 times a day

## 2023-04-30 NOTE — PHYSICAL THERAPY INITIAL EVALUATION ADULT - WEIGHT-BEARING RESTRICTIONS: GAIT, REHAB EVAL
Assumed care of pt at 2215 in . Pt A&Ox4 c/o blowing out his knee in 2021 and had orthoscopic surgery, after awhile his knee felt as if it was bone-on-bone, the pt states he went for a knee replacement and has had multiple surgeries on his right knee, pt states he did fall at one point and tore his patella tenden, pt states that the leg has gotten weaker and weaker, pt resting comfortably in bed with no signs of respiratory distress or pain
weight-bearing as tolerated

## 2023-05-03 DIAGNOSIS — I48.91 UNSPECIFIED ATRIAL FIBRILLATION: ICD-10-CM

## 2023-05-03 DIAGNOSIS — Z98.61 CORONARY ANGIOPLASTY STATUS: ICD-10-CM

## 2023-05-03 DIAGNOSIS — R53.1 WEAKNESS: ICD-10-CM

## 2023-05-03 DIAGNOSIS — H93.8X1 OTHER SPECIFIED DISORDERS OF RIGHT EAR: ICD-10-CM

## 2023-05-03 DIAGNOSIS — Z86.73 PERSONAL HISTORY OF TRANSIENT ISCHEMIC ATTACK (TIA), AND CEREBRAL INFARCTION WITHOUT RESIDUAL DEFICITS: ICD-10-CM

## 2023-05-03 DIAGNOSIS — K21.9 GASTRO-ESOPHAGEAL REFLUX DISEASE WITHOUT ESOPHAGITIS: ICD-10-CM

## 2023-05-03 DIAGNOSIS — G45.9 TRANSIENT CEREBRAL ISCHEMIC ATTACK, UNSPECIFIED: ICD-10-CM

## 2023-05-03 DIAGNOSIS — R29.700 NIHSS SCORE 0: ICD-10-CM

## 2023-05-03 DIAGNOSIS — I25.10 ATHEROSCLEROTIC HEART DISEASE OF NATIVE CORONARY ARTERY WITHOUT ANGINA PECTORIS: ICD-10-CM

## 2023-05-03 DIAGNOSIS — J45.909 UNSPECIFIED ASTHMA, UNCOMPLICATED: ICD-10-CM

## 2023-05-03 DIAGNOSIS — Z79.01 LONG TERM (CURRENT) USE OF ANTICOAGULANTS: ICD-10-CM

## 2023-05-03 DIAGNOSIS — R47.9 UNSPECIFIED SPEECH DISTURBANCES: ICD-10-CM

## 2023-05-03 DIAGNOSIS — Z91.040 LATEX ALLERGY STATUS: ICD-10-CM

## 2023-05-03 DIAGNOSIS — I10 ESSENTIAL (PRIMARY) HYPERTENSION: ICD-10-CM

## 2023-05-03 DIAGNOSIS — D86.0 SARCOIDOSIS OF LUNG: ICD-10-CM

## 2023-05-03 DIAGNOSIS — R20.0 ANESTHESIA OF SKIN: ICD-10-CM

## 2023-05-03 DIAGNOSIS — Z95.0 PRESENCE OF CARDIAC PACEMAKER: ICD-10-CM

## 2023-05-03 DIAGNOSIS — I49.5 SICK SINUS SYNDROME: ICD-10-CM

## 2023-05-03 DIAGNOSIS — Z20.822 CONTACT WITH AND (SUSPECTED) EXPOSURE TO COVID-19: ICD-10-CM

## 2023-05-03 DIAGNOSIS — Z88.5 ALLERGY STATUS TO NARCOTIC AGENT: ICD-10-CM

## 2023-05-05 ENCOUNTER — RESULT REVIEW (OUTPATIENT)
Age: 88
End: 2023-05-05

## 2023-05-05 ENCOUNTER — APPOINTMENT (OUTPATIENT)
Dept: MAMMOGRAPHY | Facility: CLINIC | Age: 88
End: 2023-05-05
Payer: MEDICARE

## 2023-05-05 ENCOUNTER — OUTPATIENT (OUTPATIENT)
Dept: OUTPATIENT SERVICES | Facility: HOSPITAL | Age: 88
LOS: 1 days | End: 2023-05-05

## 2023-05-05 ENCOUNTER — TRANSCRIPTION ENCOUNTER (OUTPATIENT)
Age: 88
End: 2023-05-05

## 2023-05-05 PROCEDURE — 77067 SCR MAMMO BI INCL CAD: CPT | Mod: 26

## 2023-05-05 PROCEDURE — 77063 BREAST TOMOSYNTHESIS BI: CPT | Mod: 26

## 2023-05-07 ENCOUNTER — FORM ENCOUNTER (OUTPATIENT)
Age: 88
End: 2023-05-07

## 2023-05-17 ENCOUNTER — RX RENEWAL (OUTPATIENT)
Age: 88
End: 2023-05-17

## 2023-05-17 RX ORDER — METOPROLOL SUCCINATE 25 MG/1
25 TABLET, EXTENDED RELEASE ORAL
Qty: 180 | Refills: 3 | Status: ACTIVE | COMMUNITY
Start: 2022-03-23 | End: 1900-01-01

## 2023-05-30 ENCOUNTER — APPOINTMENT (OUTPATIENT)
Dept: PULMONOLOGY | Facility: CLINIC | Age: 88
End: 2023-05-30
Payer: MEDICARE

## 2023-05-30 ENCOUNTER — LABORATORY RESULT (OUTPATIENT)
Age: 88
End: 2023-05-30

## 2023-05-30 VITALS
TEMPERATURE: 97.9 F | OXYGEN SATURATION: 97 % | WEIGHT: 142 LBS | RESPIRATION RATE: 12 BRPM | BODY MASS INDEX: 27.88 KG/M2 | SYSTOLIC BLOOD PRESSURE: 141 MMHG | HEART RATE: 89 BPM | HEIGHT: 60 IN | DIASTOLIC BLOOD PRESSURE: 83 MMHG

## 2023-05-30 DIAGNOSIS — J45.909 UNSPECIFIED ASTHMA, UNCOMPLICATED: ICD-10-CM

## 2023-05-30 PROCEDURE — 99214 OFFICE O/P EST MOD 30 MIN: CPT

## 2023-05-30 NOTE — HEALTH RISK ASSESSMENT
[No] : No [1 or 2 (0 pts)] : 1 or 2 (0 points) [Never (0 pts)] : Never (0 points) [No falls in past year] : Patient reported no falls in the past year [0] : 2) Feeling down, depressed, or hopeless: Not at all (0) [Never] : Never [Audit-CScore] : 0

## 2023-05-30 NOTE — ASSESSMENT
[FreeTextEntry1] : Pain on right back\par \par Most likely muscular.  Pt is concerned about her kidneys will follow with urine culture and urinalysis.  Denies pain on urination or blood in the urine. \par \par Acquired hypothyroidism\par \par   The patient to continue on thyroid replacement\par \par     Atrial fibrillation  \par \par Rate is controlled and needs to continue on anticoagulation\par \par   Hypertension \par \par Blood pressure stable. The blood pressure is controlled and to continue current regimen\par \par   Bronchial asthma\par  \par  No evidence of bronchospasm continue bronchodilators  \par \par CVA  \par \par No evidence new neurological but she followed by neurology \par \par   Sarcoidosis  \par \par Stable no evidence of a flareup.\par \par Health Maintenance:\par \par Given script for CXR\par Reviewed her labs\par Mammogram normal no need for further\par Colonoscopy no need for further. \par \par

## 2023-05-30 NOTE — END OF VISIT
[>50% of the face to face encounter time was spent on counseling and/or coordination of care for ___] : Greater than 50% of the face to face encounter time was spent on counseling and/or coordination of care for [unfilled] [Time Spent: ___ minutes] : I have spent [unfilled] minutes of time on the encounter. [FreeTextEntry3] : Pt seen with NP Kyle and agree on the above plan of care.  She is stable and assured her thatthere are no lumbs on the lower chest wall on palpitation.  UA

## 2023-05-30 NOTE — HISTORY OF PRESENT ILLNESS
[FreeTextEntry1] : She is here for follow up.   [de-identified] : She has pain right lower back in the morning but does away.  Denies pain on urination or blood in urine.  Having regular BM that are soft. Denies diarrhea or constipation.  she denies abdominal, fever, wheezing, chest pain or palpating.  Appiette is good and sleeping well.   Denies SOB.  She has some lumps that she felt today under her left breast, tender to touch.

## 2023-05-31 ENCOUNTER — NON-APPOINTMENT (OUTPATIENT)
Age: 88
End: 2023-05-31

## 2023-05-31 LAB
APPEARANCE: CLEAR
BILIRUBIN URINE: NEGATIVE
BLOOD URINE: ABNORMAL
COLOR: YELLOW
GLUCOSE QUALITATIVE U: NEGATIVE MG/DL
KETONES URINE: NEGATIVE MG/DL
LEUKOCYTE ESTERASE URINE: ABNORMAL
NITRITE URINE: NEGATIVE
PH URINE: 7.5
PROTEIN URINE: NEGATIVE MG/DL
SPECIFIC GRAVITY URINE: 1.01
UROBILINOGEN URINE: 0.2 MG/DL

## 2023-06-01 ENCOUNTER — NON-APPOINTMENT (OUTPATIENT)
Age: 88
End: 2023-06-01

## 2023-06-01 LAB — BACTERIA UR CULT: NORMAL

## 2023-06-05 NOTE — ED PROVIDER NOTE - NS ED MD DISPO ADMIT [facility]
"Chief Complaint  Follow-up (6 month follow up with labs), B12 Injection, and Immunizations    Subjective        Radha Tee presents to [facility] [place]  History of Present Illness  Patient here today for recheck of hypertension diabetes hyperlipidemia B12 deficiency and complaining of fatigue.  She also is complaining of some forgetfulness as well but states that she is overwhelmed and does have a lot on her plate right now.  She is babysitting regularly and frequently, her mother is in skilled nursing facility and her  has been ill.  She is needing refills on medications, had labs recently and is here for those results, is receiving B12 injections monthly currently.  He is concerned because her dad had Alzheimer's disease.  Is open to receiving immunizations that are due today, PCV 20.  No complaints of chest pain headache shortness of breath.  No complaints of side effects of medication.  States that she is only walking about 3 times a week now whereas she was walking daily a few months ago.  Also reports that she is really not been following a diabetic diet closely.  Understands, that she needs to do better with diet and exercise regarding diabetes.    Objective   Vital Signs:  /70   Pulse 81   Ht 167.6 cm (66\")   Wt 78 kg (172 lb)   SpO2 99%   BMI 27.76 kg/m²   Estimated body mass index is 27.76 kg/m² as calculated from the following:    Height as of this encounter: 167.6 cm (66\").    Weight as of this encounter: 78 kg (172 lb).     BMI is >= 25 and <30. (Overweight) The following options were offered after discussion;: exercise counseling/recommendations and nutrition counseling/recommendations    Physical Exam  Vitals and nursing note reviewed.   Constitutional:       General: She is not in acute distress.     Appearance: Normal appearance. She is not ill-appearing, toxic-appearing or diaphoretic.   HENT:      Head: Normocephalic and " atraumatic.      Mouth/Throat:      Mouth: Mucous membranes are moist.   Neck:      Vascular: No carotid bruit.   Cardiovascular:      Rate and Rhythm: Normal rate and regular rhythm.      Heart sounds: Normal heart sounds. No murmur heard.    No friction rub. No gallop.   Pulmonary:      Effort: Pulmonary effort is normal. No respiratory distress.      Breath sounds: Normal breath sounds. No stridor. No wheezing, rhonchi or rales.   Musculoskeletal:      Right lower leg: No edema.      Left lower leg: No edema.   Skin:     General: Skin is warm and dry.      Coloration: Skin is not jaundiced or pale.      Findings: No bruising, erythema, lesion or rash.   Neurological:      Mental Status: She is alert and oriented to person, place, and time.      Cranial Nerves: No cranial nerve deficit.      Motor: No weakness.      Coordination: Coordination normal.      Gait: Gait normal.   Psychiatric:         Mood and Affect: Mood normal.         Behavior: Behavior normal.         Thought Content: Thought content normal.         Judgment: Judgment normal.      Result Review :  The following data was reviewed by: RADHA Faria on 06/05/2023:  CMP          11/4/2022    09:04 6/1/2023    08:37   CMP   Glucose 116  111    BUN 13  17    Creatinine 0.51  0.59    EGFR 105.0  101.4    Sodium 141  140    Potassium 4.1  4.2    Chloride 104  102    Calcium 9.1  9.4    Total Protein 6.7  7.0    Albumin 4.20  4.3    Globulin 2.5  2.7    Total Bilirubin 0.5  0.6    Alkaline Phosphatase 64  70    AST (SGOT) 19  20    ALT (SGPT) 13  18    Albumin/Globulin Ratio 1.7  1.6    BUN/Creatinine Ratio 25.5  28.8    Anion Gap 8.0  8.0      CBC          11/4/2022    09:04 6/1/2023    08:37   CBC   WBC 6.27  6.39    RBC 4.11  4.12    Hemoglobin 11.9  12.4    Hematocrit 37.9  38.6    MCV 92.2  93.7    MCH 29.0  30.1    MCHC 31.4  32.1    RDW 13.1  13.2    Platelets 314  350      CBC w/diff          11/4/2022    09:04 6/1/2023    08:37   CBC w/Diff    WBC 6.27  6.39    RBC 4.11  4.12    Hemoglobin 11.9  12.4    Hematocrit 37.9  38.6    MCV 92.2  93.7    MCH 29.0  30.1    MCHC 31.4  32.1    RDW 13.1  13.2    Platelets 314  350    Neutrophil Rel % 55.4  57.7    Lymphocyte Rel % 29.2  29.4    Monocyte Rel % 11.3  9.4    Eosinophil Rel % 3.5  2.7    Basophil Rel % 0.6  0.8      Lipid Panel          11/4/2022    09:04   Lipid Panel   Total Cholesterol 199    Triglycerides 131    HDL Cholesterol 49    VLDL Cholesterol 23    LDL Cholesterol  127    LDL/HDL Ratio 2.53      TSH          11/4/2022    09:04 6/1/2023    08:37   TSH   TSH 2.700  2.120      A1C Last 3 Results          11/4/2022    09:04 6/1/2023    08:37   HGBA1C Last 3 Results   Hemoglobin A1C 5.90  6.00               Assessment and Plan   Diagnoses and all orders for this visit:    1. Essential hypertension (Primary)    2. B12 deficiency    3. Mixed hyperlipidemia  -     Lipid panel; Future    4. Type 2 diabetes mellitus without complication, without long-term current use of insulin    5. Need for pneumococcal 20-valent conjugate vaccination  -     Pneumococcal Conjugate Vaccine 20-Valent (PCV20)    6. Overweight (BMI 25.0-29.9)    Labs from 6/1/2023 are reviewed with her, lipids are due and were not ordered at that time so they are ordered today for her to return fasting at her convenience to have these drawn.  She is advised that her A1c is slightly elevated although still at diabetic goal.  She is encouraged to follow a better diabetic diet, increase exercise and return in 6 months for recheck sooner if needed.  When she sees what refills she needs that she will, back and let me know so that these can be given to her.  PCV 20 is administered in office today and patient tolerated well.  Care gaps are reviewed.  Mini-Mental exam and clock drawing test obtained today.  She passes both.  She scored 28/30 on Mini-Mental exam today.  She is advised to make sure she is taking vitamin D daily and increase B12  to every other week.  B12 level is still low normal although she is taking B12 shots monthly.  She states that she would prefer not to take B12 p.o., would prefer to take injections instead.  Is given B12 injection in office today and tolerates well.  She will again follow-up in 6 months for recheck or sooner if needed.  All questions and concerns addressed with understanding verbalized.  Patient aware and in agreement to this plan.     I spent 30 minutes caring for Radha on this date of service. This time includes time spent by me in the following activities:preparing for the visit, reviewing tests, performing a medically appropriate examination and/or evaluation , counseling and educating the patient/family/caregiver, ordering medications, tests, or procedures, and documenting information in the medical record  Follow Up   Return in about 6 months (around 12/5/2023), or if symptoms worsen or fail to improve, for Recheck, or sooner as needed.  Patient was given instructions and counseling regarding her condition or for health maintenance advice. Please see specific information pulled into the AVS if appropriate.          NONE

## 2023-06-08 NOTE — ASSESSMENT
Anesthesia Post Evaluation    Patient: Fozia Field    Procedure(s) Performed: Procedure(s) (LRB):   SECTION (N/A)    Final Anesthesia Type: spinal      Patient location during evaluation: floor  Patient participation: Yes- Able to Participate  Level of consciousness: awake and alert  Post-procedure vital signs: reviewed and stable  Pain management: adequate  Airway patency: patent    PONV status at discharge: vomiting (controlled) and nausea (controlled)  Anesthetic complications: no      Cardiovascular status: hemodynamically stable  Respiratory status: spontaneous ventilation  Hydration status: euvolemic  Follow-up not needed.  Comments:   Post op visit on Mother [facility] following SOLEDAD or SAB for Obstetrical Anesthesia/Analgesia.  Block resolved.Pt ambulating. No complaints of headache.          Vitals Value Taken Time   /74 23 0716   Temp 36.8 °C (98.2 °F) 23 0716   Pulse 77 23 0716   Resp 17 23 1454   SpO2 98 % 23 1411         Event Time   Out of Recovery 14:17:00         Pain/Melania Score: Pain Rating Prior to Med Admin: 4 (2023  2:32 AM)  Melania Score: 9 (2023  2:18 PM)         [FreeTextEntry1] : The patient is a very pleasant 87-year-old female with a history of hypertension, atrial fibrillation (on Pradaxa, previously intolerant of other DOACs), CAD status post PCI, pulmonary sarcoidosis, asthma, TIA/stroke, and SSS status post PPM.  She presents for evaluation of chronic imbalance. Suspect imbalance is multifactorial related to page, prior chronic strokes, possible vestibular dysfunction.\par \par \par PLAN\par --B12/folate\par --PT \par --RTC 3-4 months

## 2023-07-07 ENCOUNTER — NON-APPOINTMENT (OUTPATIENT)
Age: 88
End: 2023-07-07

## 2023-07-07 ENCOUNTER — APPOINTMENT (OUTPATIENT)
Dept: HEART AND VASCULAR | Facility: CLINIC | Age: 88
End: 2023-07-07
Payer: MEDICARE

## 2023-07-07 PROCEDURE — 93294 REM INTERROG EVL PM/LDLS PM: CPT

## 2023-07-07 PROCEDURE — 93296 REM INTERROG EVL PM/IDS: CPT

## 2023-07-21 NOTE — PATIENT PROFILE ADULT - NSPROPTRIGHTNOTIFY_GEN_A_NUR
----- Message from Evette Freeman sent at 7/21/2023  4:17 PM CDT -----  Regarding: Requesting call back  Pts daughter asked to speak to office about pt going to ENT appt today and being told he has 2 cyst in the back of his eyes.    Call back-[phone] Vidya     declines

## 2023-08-15 ENCOUNTER — APPOINTMENT (OUTPATIENT)
Dept: PULMONOLOGY | Facility: CLINIC | Age: 88
End: 2023-08-15
Payer: MEDICARE

## 2023-08-15 VITALS
SYSTOLIC BLOOD PRESSURE: 123 MMHG | BODY MASS INDEX: 27.68 KG/M2 | DIASTOLIC BLOOD PRESSURE: 66 MMHG | WEIGHT: 141 LBS | HEIGHT: 60 IN | HEART RATE: 67 BPM | OXYGEN SATURATION: 98 %

## 2023-08-15 PROCEDURE — 36415 COLL VENOUS BLD VENIPUNCTURE: CPT

## 2023-08-15 PROCEDURE — 99213 OFFICE O/P EST LOW 20 MIN: CPT | Mod: 25

## 2023-08-15 NOTE — HEALTH RISK ASSESSMENT
[No] : No [1 or 2 (0 pts)] : 1 or 2 (0 points) [Never (0 pts)] : Never (0 points) [No falls in past year] : Patient reported no falls in the past year [0] : 2) Feeling down, depressed, or hopeless: Not at all (0) [Audit-CScore] : 0 [Never] : Never

## 2023-08-15 NOTE — HISTORY OF PRESENT ILLNESS
[Never] : never [TextBox_4] : The blood pressure is low and she did not take the blood pressure medication.  She had some palpitation and her cardiologist increased her metoprolol.  Otherwise she is doing well [ESS] : 0 [FreeTextEntry1] : She is here for follow up.   [de-identified] : She has pain right lower back in the morning but does away.  Denies pain on urination or blood in urine.  Having regular BM that are soft. Denies diarrhea or constipation.  she denies abdominal, fever, wheezing, chest pain or palpating.  Appiette is good and sleeping well.   Denies SOB.  She has some lumps that she felt today under her left breast, tender to touch.

## 2023-08-15 NOTE — END OF VISIT
[Time Spent: ___ minutes] : I have spent [unfilled] minutes of time on the encounter. [>50% of the face to face encounter time was spent on counseling and/or coordination of care for ___] : Greater than 50% of the face to face encounter time was spent on counseling and/or coordination of care for [unfilled] [FreeTextEntry3] : Pt seen with NP Kyle and agree on the above plan of care.  She is stable and assured her thatthere are no lumbs on the lower chest wall on palpitation.  UA

## 2023-08-15 NOTE — PHYSICAL EXAM
[Normal Appearance] : normal appearance [No Neck Mass] : no neck mass [Normal Rate/Rhythm] : normal rate/rhythm [No Murmurs] : no murmurs [No Resp Distress] : no resp distress [Clear to Auscultation Bilaterally] : clear to auscultation bilaterally [No Abnormalities] : no abnormalities [Benign] : benign [No Clubbing] : no clubbing [No Cyanosis] : no cyanosis [FROM] : FROM [Normal Color/ Pigmentation] : normal color/ pigmentation [Oriented x3] : oriented x3 [No Acute Distress] : no acute distress [Well Nourished] : well nourished [Well Developed] : well developed [Well-Appearing] : well-appearing [Normal Sclera/Conjunctiva] : normal sclera/conjunctiva [PERRL] : pupils equal round and reactive to light [EOMI] : extraocular movements intact [Normal Outer Ear/Nose] : the outer ears and nose were normal in appearance [Normal Oropharynx] : the oropharynx was normal [No JVD] : no jugular venous distention [No Lymphadenopathy] : no lymphadenopathy [Supple] : supple [Thyroid Normal, No Nodules] : the thyroid was normal and there were no nodules present [No Respiratory Distress] : no respiratory distress  [No Accessory Muscle Use] : no accessory muscle use [Clear to Auscultation] : lungs were clear to auscultation bilaterally [Normal Rate] : normal rate  [Regular Rhythm] : with a regular rhythm [Normal S1, S2] : normal S1 and S2 [No Murmur] : no murmur heard [No Carotid Bruits] : no carotid bruits [No Abdominal Bruit] : a ~M bruit was not heard ~T in the abdomen [No Varicosities] : no varicosities [Pedal Pulses Present] : the pedal pulses are present [No Edema] : there was no peripheral edema [No Palpable Aorta] : no palpable aorta [No Extremity Clubbing/Cyanosis] : no extremity clubbing/cyanosis [Soft] : abdomen soft [Non Tender] : non-tender [Non-distended] : non-distended [No Masses] : no abdominal mass palpated [No HSM] : no HSM [Normal Bowel Sounds] : normal bowel sounds [Normal Posterior Cervical Nodes] : no posterior cervical lymphadenopathy [Normal Anterior Cervical Nodes] : no anterior cervical lymphadenopathy [No CVA Tenderness] : no CVA  tenderness [No Spinal Tenderness] : no spinal tenderness [No Joint Swelling] : no joint swelling [Grossly Normal Strength/Tone] : grossly normal strength/tone [No Rash] : no rash [Coordination Grossly Intact] : coordination grossly intact [No Focal Deficits] : no focal deficits [Normal Gait] : normal gait [Deep Tendon Reflexes (DTR)] : deep tendon reflexes were 2+ and symmetric [Normal Affect] : the affect was normal [Normal Insight/Judgement] : insight and judgment were intact

## 2023-08-15 NOTE — ASSESSMENT
[FreeTextEntry1] : Pain on right back resolved  Acquired hypothyroidism. The patient to continue on thyroid replacement and follow on labs.  Atrial fibrillation Rate is controlled and needs to continue on anticoagulation.  She did not take her BB last 2 days due to low BP and will discuss with EP and cardio.  Hypertension Blood pressure stable. The blood pressure is controlled and off HCTZ and BB  Bronchial asthma No evidence of bronchospasm continue bronchodilators  CVA No evidence new neurological but she followed by neurology   Sarcoidosis Stable no evidence of a flareup.  Health Maintenance:   Mammogram normal no need for further Colonoscopy no need for further.

## 2023-08-16 ENCOUNTER — RX RENEWAL (OUTPATIENT)
Age: 88
End: 2023-08-16

## 2023-08-16 LAB
ESTIMATED AVERAGE GLUCOSE: 114 MG/DL
HBA1C MFR BLD HPLC: 5.6 %
TSH SERPL-ACNC: 2.36 UIU/ML

## 2023-08-17 LAB
ALBUMIN SERPL ELPH-MCNC: 4.1 G/DL
ALP BLD-CCNC: 89 U/L
ALT SERPL-CCNC: 20 U/L
ANION GAP SERPL CALC-SCNC: 18 MMOL/L
AST SERPL-CCNC: 22 U/L
BILIRUB SERPL-MCNC: 0.3 MG/DL
BUN SERPL-MCNC: 17 MG/DL
CALCIUM SERPL-MCNC: 9.2 MG/DL
CHLORIDE SERPL-SCNC: 105 MMOL/L
CHOLEST SERPL-MCNC: 185 MG/DL
CO2 SERPL-SCNC: 19 MMOL/L
CREAT SERPL-MCNC: 1.22 MG/DL
EGFR: 43 ML/MIN/1.73M2
GLUCOSE SERPL-MCNC: 97 MG/DL
HDLC SERPL-MCNC: 43 MG/DL
LDLC SERPL CALC-MCNC: 114 MG/DL
NONHDLC SERPL-MCNC: 142 MG/DL
POTASSIUM SERPL-SCNC: 4.6 MMOL/L
PROT SERPL-MCNC: 6.7 G/DL
SODIUM SERPL-SCNC: 142 MMOL/L
TRIGL SERPL-MCNC: 156 MG/DL

## 2023-08-20 ENCOUNTER — EMERGENCY (EMERGENCY)
Facility: HOSPITAL | Age: 88
LOS: 1 days | Discharge: HOME | End: 2023-08-20
Attending: EMERGENCY MEDICINE | Admitting: EMERGENCY MEDICINE
Payer: MEDICARE

## 2023-08-20 VITALS
RESPIRATION RATE: 17 BRPM | HEART RATE: 65 BPM | SYSTOLIC BLOOD PRESSURE: 164 MMHG | OXYGEN SATURATION: 98 % | DIASTOLIC BLOOD PRESSURE: 78 MMHG

## 2023-08-20 VITALS
OXYGEN SATURATION: 99 % | WEIGHT: 139.99 LBS | RESPIRATION RATE: 20 BRPM | DIASTOLIC BLOOD PRESSURE: 76 MMHG | SYSTOLIC BLOOD PRESSURE: 140 MMHG | TEMPERATURE: 97 F | HEART RATE: 66 BPM

## 2023-08-20 DIAGNOSIS — R53.1 WEAKNESS: ICD-10-CM

## 2023-08-20 DIAGNOSIS — Z86.73 PERSONAL HISTORY OF TRANSIENT ISCHEMIC ATTACK (TIA), AND CEREBRAL INFARCTION WITHOUT RESIDUAL DEFICITS: ICD-10-CM

## 2023-08-20 DIAGNOSIS — Z95.0 PRESENCE OF CARDIAC PACEMAKER: ICD-10-CM

## 2023-08-20 DIAGNOSIS — R42 DIZZINESS AND GIDDINESS: ICD-10-CM

## 2023-08-20 DIAGNOSIS — Z87.19 PERSONAL HISTORY OF OTHER DISEASES OF THE DIGESTIVE SYSTEM: ICD-10-CM

## 2023-08-20 DIAGNOSIS — Z91.040 LATEX ALLERGY STATUS: ICD-10-CM

## 2023-08-20 DIAGNOSIS — Z87.09 PERSONAL HISTORY OF OTHER DISEASES OF THE RESPIRATORY SYSTEM: ICD-10-CM

## 2023-08-20 DIAGNOSIS — Z20.822 CONTACT WITH AND (SUSPECTED) EXPOSURE TO COVID-19: ICD-10-CM

## 2023-08-20 DIAGNOSIS — R06.02 SHORTNESS OF BREATH: ICD-10-CM

## 2023-08-20 DIAGNOSIS — I48.91 UNSPECIFIED ATRIAL FIBRILLATION: ICD-10-CM

## 2023-08-20 DIAGNOSIS — E03.9 HYPOTHYROIDISM, UNSPECIFIED: ICD-10-CM

## 2023-08-20 DIAGNOSIS — Z88.5 ALLERGY STATUS TO NARCOTIC AGENT: ICD-10-CM

## 2023-08-20 DIAGNOSIS — I10 ESSENTIAL (PRIMARY) HYPERTENSION: ICD-10-CM

## 2023-08-20 DIAGNOSIS — I25.10 ATHEROSCLEROTIC HEART DISEASE OF NATIVE CORONARY ARTERY WITHOUT ANGINA PECTORIS: ICD-10-CM

## 2023-08-20 DIAGNOSIS — Z79.02 LONG TERM (CURRENT) USE OF ANTITHROMBOTICS/ANTIPLATELETS: ICD-10-CM

## 2023-08-20 DIAGNOSIS — J45.909 UNSPECIFIED ASTHMA, UNCOMPLICATED: ICD-10-CM

## 2023-08-20 LAB
ALBUMIN SERPL ELPH-MCNC: 4.1 G/DL — SIGNIFICANT CHANGE UP (ref 3.3–5)
ALP SERPL-CCNC: 97 U/L — SIGNIFICANT CHANGE UP (ref 40–120)
ALT FLD-CCNC: 33 U/L — SIGNIFICANT CHANGE UP (ref 10–45)
ANION GAP SERPL CALC-SCNC: 11 MMOL/L — SIGNIFICANT CHANGE UP (ref 5–17)
APPEARANCE UR: CLEAR — SIGNIFICANT CHANGE UP
AST SERPL-CCNC: 31 U/L — SIGNIFICANT CHANGE UP (ref 10–40)
BACTERIA # UR AUTO: SIGNIFICANT CHANGE UP /HPF
BASOPHILS # BLD AUTO: 0.1 K/UL — SIGNIFICANT CHANGE UP (ref 0–0.2)
BASOPHILS NFR BLD AUTO: 0.9 % — SIGNIFICANT CHANGE UP (ref 0–2)
BILIRUB SERPL-MCNC: 0.5 MG/DL — SIGNIFICANT CHANGE UP (ref 0.2–1.2)
BILIRUB UR-MCNC: NEGATIVE — SIGNIFICANT CHANGE UP
BUN SERPL-MCNC: 16 MG/DL — SIGNIFICANT CHANGE UP (ref 7–23)
CALCIUM SERPL-MCNC: 9.3 MG/DL — SIGNIFICANT CHANGE UP (ref 8.4–10.5)
CHLORIDE SERPL-SCNC: 107 MMOL/L — SIGNIFICANT CHANGE UP (ref 96–108)
CO2 SERPL-SCNC: 23 MMOL/L — SIGNIFICANT CHANGE UP (ref 22–31)
COLOR SPEC: YELLOW — SIGNIFICANT CHANGE UP
CREAT SERPL-MCNC: 1.2 MG/DL — SIGNIFICANT CHANGE UP (ref 0.5–1.3)
D DIMER BLD IA.RAPID-MCNC: 167 NG/ML DDU — SIGNIFICANT CHANGE UP
DIFF PNL FLD: NEGATIVE — SIGNIFICANT CHANGE UP
EGFR: 44 ML/MIN/1.73M2 — LOW
EOSINOPHIL # BLD AUTO: 0.18 K/UL — SIGNIFICANT CHANGE UP (ref 0–0.5)
EOSINOPHIL NFR BLD AUTO: 1.7 % — SIGNIFICANT CHANGE UP (ref 0–6)
EPI CELLS # UR: SIGNIFICANT CHANGE UP /HPF (ref 0–5)
GLUCOSE SERPL-MCNC: 106 MG/DL — HIGH (ref 70–99)
GLUCOSE UR QL: NEGATIVE — SIGNIFICANT CHANGE UP
HCT VFR BLD CALC: 42.4 % — SIGNIFICANT CHANGE UP (ref 34.5–45)
HGB BLD-MCNC: 14 G/DL — SIGNIFICANT CHANGE UP (ref 11.5–15.5)
IMM GRANULOCYTES NFR BLD AUTO: 0.5 % — SIGNIFICANT CHANGE UP (ref 0–0.9)
KETONES UR-MCNC: NEGATIVE — SIGNIFICANT CHANGE UP
LEUKOCYTE ESTERASE UR-ACNC: ABNORMAL
LYMPHOCYTES # BLD AUTO: 2.34 K/UL — SIGNIFICANT CHANGE UP (ref 1–3.3)
LYMPHOCYTES # BLD AUTO: 21.9 % — SIGNIFICANT CHANGE UP (ref 13–44)
MCHC RBC-ENTMCNC: 30.9 PG — SIGNIFICANT CHANGE UP (ref 27–34)
MCHC RBC-ENTMCNC: 33 GM/DL — SIGNIFICANT CHANGE UP (ref 32–36)
MCV RBC AUTO: 93.6 FL — SIGNIFICANT CHANGE UP (ref 80–100)
MONOCYTES # BLD AUTO: 0.84 K/UL — SIGNIFICANT CHANGE UP (ref 0–0.9)
MONOCYTES NFR BLD AUTO: 7.9 % — SIGNIFICANT CHANGE UP (ref 2–14)
NEUTROPHILS # BLD AUTO: 7.18 K/UL — SIGNIFICANT CHANGE UP (ref 1.8–7.4)
NEUTROPHILS NFR BLD AUTO: 67.1 % — SIGNIFICANT CHANGE UP (ref 43–77)
NITRITE UR-MCNC: NEGATIVE — SIGNIFICANT CHANGE UP
NRBC # BLD: 0 /100 WBCS — SIGNIFICANT CHANGE UP (ref 0–0)
NT-PROBNP SERPL-SCNC: 5252 PG/ML — HIGH (ref 0–300)
PH UR: 6 — SIGNIFICANT CHANGE UP (ref 5–8)
PLATELET # BLD AUTO: 295 K/UL — SIGNIFICANT CHANGE UP (ref 150–400)
POTASSIUM SERPL-MCNC: 4.1 MMOL/L — SIGNIFICANT CHANGE UP (ref 3.5–5.3)
POTASSIUM SERPL-SCNC: 4.1 MMOL/L — SIGNIFICANT CHANGE UP (ref 3.5–5.3)
PROT SERPL-MCNC: 7.2 G/DL — SIGNIFICANT CHANGE UP (ref 6–8.3)
PROT UR-MCNC: NEGATIVE MG/DL — SIGNIFICANT CHANGE UP
RAPID RVP RESULT: SIGNIFICANT CHANGE UP
RBC # BLD: 4.53 M/UL — SIGNIFICANT CHANGE UP (ref 3.8–5.2)
RBC # FLD: 13.8 % — SIGNIFICANT CHANGE UP (ref 10.3–14.5)
RBC CASTS # UR COMP ASSIST: < 5 /HPF — SIGNIFICANT CHANGE UP
SARS-COV-2 RNA SPEC QL NAA+PROBE: SIGNIFICANT CHANGE UP
SODIUM SERPL-SCNC: 141 MMOL/L — SIGNIFICANT CHANGE UP (ref 135–145)
SP GR SPEC: 1.01 — SIGNIFICANT CHANGE UP (ref 1–1.03)
TROPONIN T, HIGH SENSITIVITY RESULT: 11 NG/L — SIGNIFICANT CHANGE UP (ref 0–51)
UROBILINOGEN FLD QL: 0.2 E.U./DL — SIGNIFICANT CHANGE UP
WBC # BLD: 10.69 K/UL — HIGH (ref 3.8–10.5)
WBC # FLD AUTO: 10.69 K/UL — HIGH (ref 3.8–10.5)
WBC UR QL: < 5 /HPF — SIGNIFICANT CHANGE UP

## 2023-08-20 PROCEDURE — 71250 CT THORAX DX C-: CPT | Mod: MA

## 2023-08-20 PROCEDURE — 83880 ASSAY OF NATRIURETIC PEPTIDE: CPT

## 2023-08-20 PROCEDURE — 70450 CT HEAD/BRAIN W/O DYE: CPT | Mod: 26,MA

## 2023-08-20 PROCEDURE — 81001 URINALYSIS AUTO W/SCOPE: CPT

## 2023-08-20 PROCEDURE — 0225U NFCT DS DNA&RNA 21 SARSCOV2: CPT

## 2023-08-20 PROCEDURE — 36415 COLL VENOUS BLD VENIPUNCTURE: CPT

## 2023-08-20 PROCEDURE — 70450 CT HEAD/BRAIN W/O DYE: CPT | Mod: MA

## 2023-08-20 PROCEDURE — 93005 ELECTROCARDIOGRAM TRACING: CPT

## 2023-08-20 PROCEDURE — 99284 EMERGENCY DEPT VISIT MOD MDM: CPT

## 2023-08-20 PROCEDURE — 71045 X-RAY EXAM CHEST 1 VIEW: CPT

## 2023-08-20 PROCEDURE — 71250 CT THORAX DX C-: CPT | Mod: 26,MA

## 2023-08-20 PROCEDURE — 99285 EMERGENCY DEPT VISIT HI MDM: CPT | Mod: 25

## 2023-08-20 PROCEDURE — 80053 COMPREHEN METABOLIC PANEL: CPT

## 2023-08-20 PROCEDURE — 84484 ASSAY OF TROPONIN QUANT: CPT

## 2023-08-20 PROCEDURE — 71045 X-RAY EXAM CHEST 1 VIEW: CPT | Mod: 26

## 2023-08-20 PROCEDURE — 85379 FIBRIN DEGRADATION QUANT: CPT

## 2023-08-20 PROCEDURE — 85025 COMPLETE CBC W/AUTO DIFF WBC: CPT

## 2023-08-20 RX ORDER — SODIUM CHLORIDE 9 MG/ML
500 INJECTION INTRAMUSCULAR; INTRAVENOUS; SUBCUTANEOUS ONCE
Refills: 0 | Status: COMPLETED | OUTPATIENT
Start: 2023-08-20 | End: 2023-08-20

## 2023-08-20 RX ADMIN — SODIUM CHLORIDE 1000 MILLILITER(S): 9 INJECTION INTRAMUSCULAR; INTRAVENOUS; SUBCUTANEOUS at 12:43

## 2023-08-20 NOTE — ED PROVIDER NOTE - CLINICAL SUMMARY MEDICAL DECISION MAKING FREE TEXT BOX
86 yo hx of hypertension, atrial fibrillation (on Pradaxa, previously intolerant of other DOACs 2/2 to bleeding/easy bruisability), hypothyroid, CAD status post PCI, pulmonary sarcoidosis, asthma, TIA/stroke, and SSS status post PPM presenting to ED for episode of hypertension  Ddx sarcoidosis, PE, ACS, URI/pna, other 88 yo PMHx of hypertension, atrial fibrillation, hypothyroid, CAD status post PCI, pulmonary sarcoidosis, asthma, TIA/stroke, w shortness of breath, weakness, dizziness for the last three days and feelings of dehydration. Denies edema, chest pain, cough, f/c, or any other acute complaints. Pt states feels weak, generalized weakness- no focal weakness/numbness. no abd pain, n/v.  Pt states intermittent vague off balance sensation.  Ddx exacerbation of sarcoidosis, PE, ACS, URI/pna, uti, other  labs, UA, CT chest/CT head ?intermittent off balance sensation, ?headache, no cerebellar findings on exam, ivf, reassess.

## 2023-08-20 NOTE — ED ADULT TRIAGE NOTE - CHIEF COMPLAINT QUOTE
" I have dizziness and SOB since Thursday. I have sarcoidosis and I am using my inhaler very often." pt reports SOb is constant. Denies any CP or palpation. + pacemaker, HTN. On blood thinners.

## 2023-08-20 NOTE — ED ADULT NURSE NOTE - NSFALLHARMRISKINTERV_ED_ALL_ED

## 2023-08-20 NOTE — ED PROVIDER NOTE - PATIENT PORTAL LINK FT
You can access the FollowMyHealth Patient Portal offered by [facility] by registering at the following website: [email or web address]. By joining [employer]’s FollowMyHealth portal, you will also be able to view your health information using other applications (apps) compatible with our system.

## 2023-08-20 NOTE — ED PROVIDER NOTE - OBJECTIVE STATEMENT
88 yo PMHx of hypertension, atrial fibrillation (on Pradaxa, previously intolerant of other DOACs 2/2 to bleeding/easy bruisability), hypothyroid, CAD status post PCI, pulmonary sarcoidosis, asthma, TIA/stroke, w shortness of breath, weakness, dizziness for the last three days and feelings of dehydration. Denies edema, chest pain, cough, f/c, or any other acute complaints. 88 yo PMHx of hypertension, atrial fibrillation, hypothyroid, CAD status post PCI, pulmonary sarcoidosis, asthma, TIA/stroke, w shortness of breath, weakness, dizziness for the last three days and feelings of dehydration. Denies edema, chest pain, cough, f/c, or any other acute complaints. Pt states feels weak, generalized weakness- no focal weakness/numbness. no abd pain, n/v. Pt states intermittent vague off balance sensation.

## 2023-08-20 NOTE — ED ADULT NURSE NOTE - OBJECTIVE STATEMENT
87 y.o. Female c/o dizziness, headache, SOB, & congestion beginning x3-4 days ago states "I think I am dehydrated." Pt states she is SOB with worsening HUANG. Pt denies CP, fevers/chills, cough, abd pain nvd, weakness, syncope, numbness/tingling, vision changes. Placed on CCM upon arrival. +pacemaker placed approximately 13 years ago pt states battery is due to be changed in the next 3 months. +anticoagulants. PMHx HTN, Sarcoidosis, hypothyroid.

## 2023-08-20 NOTE — ED PROVIDER NOTE - PROGRESS NOTE DETAILS
work up wnl, ambulating, VSS including no hypoxia, Discussed with pt results of work up, strict return precautions, and need for follow up.  Pt expressed understanding and agrees with plan. work up wnl including no wbc elevation/uti/pna, CTs no acute process, ambulating wo resp distress/tachypnea, VSS including no hypoxia, elev bnp but no signs of fluid overload on exam, discussed with pt results of work up, strict return precautions, and need for follow up.  Pt expressed understanding and agrees with plan. work up grossly wnl including no uti/pna, CTs no acute process, ambulating wo resp distress/tachypnea, VSS including no hypoxia, elev bnp but no signs of fluid overload on exam, discussed with pt results of work up, strict return precautions, and need for follow up.  Pt expressed understanding and agrees with plan.

## 2023-08-20 NOTE — ED PROVIDER NOTE - PHYSICAL EXAMINATION

## 2023-08-20 NOTE — ED PROVIDER NOTE - NSFOLLOWUPINSTRUCTIONS_ED_ALL_ED_FT
[facility]  Family Medicine  [address] [place] [address]  Phone: [phone]    Shortness of breath    Shortness of breath (dyspnea) means you have trouble breathing and could indicate a medical problem. Causes include lung disease, heart disease, low amount of red blood cells (anemia), poor physical fitness, being overweight, smoking, etc. Your health care provider today may not be able to find a cause for your shortness of breath after your exam. In this case, it is important to have a follow-up exam with your primary care physician as instructed. If medicines were prescribed, take them as directed for the full length of time directed. Refrain from tobacco products.    SEEK IMMEDIATE MEDICAL CARE IF YOU HAVE ANY OF THE FOLLOWING SYMPTOMS: worsening shortness of breath, chest pain, back pain, abdominal pain, fever, coughing up blood, lightheadedness/dizziness.

## 2023-08-22 ENCOUNTER — APPOINTMENT (OUTPATIENT)
Dept: PULMONOLOGY | Facility: CLINIC | Age: 88
End: 2023-08-22
Payer: MEDICARE

## 2023-08-22 PROCEDURE — 36415 COLL VENOUS BLD VENIPUNCTURE: CPT

## 2023-08-24 ENCOUNTER — NON-APPOINTMENT (OUTPATIENT)
Age: 88
End: 2023-08-24

## 2023-08-25 ENCOUNTER — APPOINTMENT (OUTPATIENT)
Dept: NEUROLOGY | Facility: CLINIC | Age: 88
End: 2023-08-25

## 2023-08-27 LAB — NT-PROBNP SERPL-MCNC: 6072 PG/ML

## 2023-09-05 ENCOUNTER — APPOINTMENT (OUTPATIENT)
Dept: HEART AND VASCULAR | Facility: CLINIC | Age: 88
End: 2023-09-05
Payer: MEDICARE

## 2023-09-05 ENCOUNTER — NON-APPOINTMENT (OUTPATIENT)
Age: 88
End: 2023-09-05

## 2023-09-05 VITALS
DIASTOLIC BLOOD PRESSURE: 64 MMHG | OXYGEN SATURATION: 100 % | HEIGHT: 60 IN | WEIGHT: 144 LBS | SYSTOLIC BLOOD PRESSURE: 125 MMHG | BODY MASS INDEX: 28.27 KG/M2 | HEART RATE: 68 BPM

## 2023-09-05 PROCEDURE — 93000 ELECTROCARDIOGRAM COMPLETE: CPT | Mod: XU

## 2023-09-05 PROCEDURE — 99214 OFFICE O/P EST MOD 30 MIN: CPT | Mod: 25

## 2023-09-05 PROCEDURE — 93280 PM DEVICE PROGR EVAL DUAL: CPT

## 2023-09-05 PROCEDURE — 99213 OFFICE O/P EST LOW 20 MIN: CPT | Mod: 25

## 2023-09-05 NOTE — PHYSICAL EXAM

## 2023-09-05 NOTE — PHYSICAL EXAM
[General Appearance - Well Developed] : well developed [Normal Appearance] : normal appearance [Well Groomed] : well groomed [General Appearance - Well Nourished] : well nourished [No Deformities] : no deformities [General Appearance - In No Acute Distress] : no acute distress [Left Infraclavicular] : left infraclavicular area [Clean] : clean [Dry] : dry [Well-Healed] : well-healed [Well Developed] : well developed [Well Nourished] : well nourished [No Acute Distress] : no acute distress [Normal Conjunctiva] : normal conjunctiva [Normal Venous Pressure] : normal venous pressure [No Carotid Bruit] : no carotid bruit [Normal S1, S2] : normal S1, S2 [No Murmur] : no murmur [No Rub] : no rub [No Gallop] : no gallop [Clear Lung Fields] : clear lung fields [Good Air Entry] : good air entry [No Respiratory Distress] : no respiratory distress  [Soft] : abdomen soft [Non Tender] : non-tender [No Masses/organomegaly] : no masses/organomegaly [Normal Bowel Sounds] : normal bowel sounds [Normal Gait] : normal gait [No Cyanosis] : no cyanosis [No Clubbing] : no clubbing [No Varicosities] : no varicosities [No Rash] : no rash [No Skin Lesions] : no skin lesions [Moves all extremities] : moves all extremities [No Focal Deficits] : no focal deficits [Normal Speech] : normal speech [Alert and Oriented] : alert and oriented [Normal memory] : normal memory [Palpable Crepitus] : no palpable crepitus [Bleeding] : no active bleeding [Foul Odor] : no foul smell [Purulent Drainage] : no purulent drainage [Serosanguineous Drainage] : no serosanquineous drainage [Serous Drainage] : no serous drainage [Erythema] : not erythematous [Warm] : not warm [Tender] : not tender [Indurated] : not indurated [Fluctuant] : not fluctuant [de-identified] : 1+ edema b/l

## 2023-09-05 NOTE — CARDIOLOGY SUMMARY
[de-identified] : 5/10/2022: NSR, non-specific diffuse TW changes [de-identified] : 6/10/2022:Normal biventricular systolic function. Mildly enlarged LA. Grade II diastolic dysfunction. Mild to mod MR. Mild AI. No pericardial effusion.  [de-identified] : Hx of cath with stents

## 2023-09-05 NOTE — CARDIOLOGY SUMMARY
[de-identified] : 9/5/23: V-paced 4/4/23: NSR, non-specific diffuse TW changes 10/25//2022: NSR, non-specific diffuse TW changes 5/10/2022: NSR, non-specific diffuse TW changes [de-identified] : 6/10/2022:Normal biventricular systolic function. Mildly enlarged LA. Grade II diastolic dysfunction. Mild to mod MR. Mild AI. No pericardial effusion.  [de-identified] : Pharmacological stress test performed on 11/2/22 Hx of 2 stents in 2011. \par  Normal ECG portion. Normal perfusion portion. TID ischemia vs elev EDP. No recurrent chest pains. BP was wnll during stress.  [de-identified] : Hx of cath with stents

## 2023-09-05 NOTE — DISCUSSION/SUMMARY
[FreeTextEntry1] : 88 year old female with SSS s/p PPM, HTN, Afib on Pradaxa, CAD (hx of stents), pulmonary sarcoidosis, asthma, and TIA/CVA is here for f/u.   # Shortness of breath - likely 2/2 to loss of synchrony and VVI pcaing. Battery end of life. Will get gen change on 9/7/23. Will re-assess symptoms post. Continue Lasix 20 mg qd for now. Check BNP and BMP today # Chest pain - resolved. Normal perfusion on pharm stress testing. No symptoms or change in ET. TID may have been in setting of elev EDP given grade II DD.   #. Afib c/b CVA - paroxysmal. Cont Pradaxa. Rate controlled. Cont Metoprolol 75 mg bid. f/u with EP re: battery change for PPM, for SSS  #. CAD - hx of PCI. > 5 yrs ago. Off antiplatelet agents, on Pradaxa bid #. Hyperlipidemia - LDL 98 (goal <70). Cont Pravastatin 80 mg qd.  Re-check lipid profile in May. Pt has become a vegetarian in the past 3 mos and would like to tighten the lipid control with dietary modifications. Does not want to switch to a high intensity statim at this time.   #. HTN - Acceptable today and per patient says it is controlled at home. Cont Toprol XL 75 mg bid. ?hx of allergy to Lisinopril. She cannot recall but says the tongue may have swollen [place] she took it. Hx of a bad reaction to Norvasc as well. Cont lasix 20 mg qd as above #. Pulm Sarcoidosis and Asthma - stable, followed by Pulm - Dr. Riggs.   f/u in 3 mos [EKG obtained to assist in diagnosis and management of assessed problem(s)] : EKG obtained to assist in diagnosis and management of assessed problem(s)

## 2023-09-05 NOTE — HISTORY OF PRESENT ILLNESS
[FreeTextEntry1] : 88-year-old female with SSS s/p PPM, HTN, Afib on Pradaxa, CAD (hx of stents), pulmonary sarcoidosis, asthma, and TIA/CVA is here for f/u.   Pt sees neurology (Dr. Rick) and Pulm (Dr. Riggs).   9/5/23: Pt has been feeling more SOB over the past few mos. No CP. She was started on lasix by Dr. Riggs on 8/24/23 but it hasn't helped as much as she had hoped. No CP. BNP has been going up (last one was 6072) PPM interrogation today by Dr. Garcia revealed battery - end of life; VVI pacing   4/4/23: No complaints. BP has been well controlled at home.   1/31/23: No cardiac complaints. BP has been much lower and within normal limits at home.   10/25/22: Pt complained of one episode of substernal chest pain in the last two weeks.It lasted about 5 mi, was pressure-like and uncomfortable. BP OK today and Metoprolol was increased today too 75 mg bid by EP to improve Afib rate control based on interrogation of device) as patient does not feel well during the episodes when the rate increases.  She feels a headache and chills when she has afib with RVR.   No shortness of breath. No dyspnea on exertion. No orthopnea. No PND.   _______________________ Latest labs from 10/25/22: LDL 98 K 4.5  Cr 1.25 LFTs wnl A1c 5.9

## 2023-09-05 NOTE — HISTORY OF PRESENT ILLNESS
[Palpitations] : no palpitations [SOB] : no dyspnea [Syncope] : no syncope [Dizziness] : no dizziness [Chest Pain] : no chest pain or discomfort [Pain at Site] : no pain at device site [de-identified] : This is an 88 year old female with SSS S/P PPM, HTN, Afib, CAD and TIA/CVA. She was previously on Xarelto.  Records reflect an episode of gum bleeding (requiring packing) while on Xarelto, as well as possible GI ulcer and significant bruising.  Later started on Eliquis and reported significant bruising.  Eliquis was switched to Pradaxa and she continues to feel the bruising has been less on this agent.  Sees neurology and GI (Dr. Gutierrez).  Sees Dr. Fatima for CAD hx stent.  H/o gait instability- evaluated by Dr. Rick,  no acute infarct/hemorrhages.  OK to continue Pradaxa.  She denies any ongoing gait disturbances.  Interval history significant for ED evaluation with c/o fatigue, found to have elevated PBNP.  Started on Lasix with improvement.  She presents for device interrogation as battery is approaching SULY.   SEE RODDY

## 2023-09-06 LAB
ANION GAP SERPL CALC-SCNC: 10 MMOL/L
BUN SERPL-MCNC: 20 MG/DL
CALCIUM SERPL-MCNC: 9.5 MG/DL
CHLORIDE SERPL-SCNC: 105 MMOL/L
CO2 SERPL-SCNC: 28 MMOL/L
CREAT SERPL-MCNC: 1.46 MG/DL
EGFR: 34 ML/MIN/1.73M2
GLUCOSE SERPL-MCNC: 76 MG/DL
NT-PROBNP SERPL-MCNC: 4148 PG/ML
POTASSIUM SERPL-SCNC: 4.2 MMOL/L
SODIUM SERPL-SCNC: 143 MMOL/L

## 2023-09-07 ENCOUNTER — OUTPATIENT (OUTPATIENT)
Dept: OUTPATIENT SERVICES | Facility: HOSPITAL | Age: 88
LOS: 1 days | Discharge: HOME | End: 2023-09-07
Payer: MEDICARE

## 2023-09-07 LAB
INR BLD: 1.49 — HIGH (ref 0.85–1.18)
ISTAT INR: 2.9 — HIGH (ref 0.88–1.16)
ISTAT INR: 3 — HIGH (ref 0.88–1.16)
ISTAT PT: 32.8 SEC — HIGH (ref 10–12.9)
ISTAT PT: 34.6 SEC — HIGH (ref 10–12.9)
ISTAT VENOUS BE: 1 MMOL/L — SIGNIFICANT CHANGE UP (ref -2–3)
ISTAT VENOUS GLUCOSE: 96 MG/DL — SIGNIFICANT CHANGE UP (ref 70–99)
ISTAT VENOUS HCO3: 27 MMOL/L — SIGNIFICANT CHANGE UP (ref 23–28)
ISTAT VENOUS HEMATOCRIT: 41 % — SIGNIFICANT CHANGE UP (ref 34.5–45)
ISTAT VENOUS HEMOGLOBIN: 13.9 GM/DL — SIGNIFICANT CHANGE UP (ref 11.5–15.5)
ISTAT VENOUS IONIZED CALCIUM: 1.18 MMOL/L — SIGNIFICANT CHANGE UP (ref 1.12–1.3)
ISTAT VENOUS PCO2: 48 MMHG — SIGNIFICANT CHANGE UP (ref 41–51)
ISTAT VENOUS PH: 7.35 — SIGNIFICANT CHANGE UP (ref 7.31–7.41)
ISTAT VENOUS PO2: <66 MMHG — LOW (ref 35–40)
ISTAT VENOUS POTASSIUM: 4.3 MMOL/L — SIGNIFICANT CHANGE UP (ref 3.5–5.3)
ISTAT VENOUS SO2: 30 % — SIGNIFICANT CHANGE UP
ISTAT VENOUS SODIUM: 143 MMOL/L — SIGNIFICANT CHANGE UP (ref 135–145)
ISTAT VENOUS TCO2: 28 MMOL/L — SIGNIFICANT CHANGE UP (ref 22–31)
PROTHROM AB SERPL-ACNC: 16.8 SEC — HIGH (ref 9.5–13)

## 2023-09-07 PROCEDURE — 84295 ASSAY OF SERUM SODIUM: CPT

## 2023-09-07 PROCEDURE — C1889: CPT

## 2023-09-07 PROCEDURE — 36415 COLL VENOUS BLD VENIPUNCTURE: CPT

## 2023-09-07 PROCEDURE — 84132 ASSAY OF SERUM POTASSIUM: CPT

## 2023-09-07 PROCEDURE — 93010 ELECTROCARDIOGRAM REPORT: CPT | Mod: 59

## 2023-09-07 PROCEDURE — 82803 BLOOD GASES ANY COMBINATION: CPT

## 2023-09-07 PROCEDURE — 82947 ASSAY GLUCOSE BLOOD QUANT: CPT

## 2023-09-07 PROCEDURE — 33228 REMV&REPLC PM GEN DUAL LEAD: CPT

## 2023-09-07 PROCEDURE — C1785: CPT

## 2023-09-07 PROCEDURE — 93005 ELECTROCARDIOGRAM TRACING: CPT

## 2023-09-07 PROCEDURE — 85014 HEMATOCRIT: CPT

## 2023-09-07 PROCEDURE — 85610 PROTHROMBIN TIME: CPT

## 2023-09-07 PROCEDURE — 82330 ASSAY OF CALCIUM: CPT

## 2023-09-15 DIAGNOSIS — T82.111A BREAKDOWN (MECHANICAL) OF CARDIAC PULSE GENERATOR (BATTERY), INITIAL ENCOUNTER: ICD-10-CM

## 2023-09-15 DIAGNOSIS — I49.5 SICK SINUS SYNDROME: ICD-10-CM
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
## 2023-09-26 ENCOUNTER — APPOINTMENT (OUTPATIENT)
Dept: HEART AND VASCULAR | Facility: CLINIC | Age: 88
End: 2023-09-26
Payer: MEDICARE

## 2023-09-26 VITALS
TEMPERATURE: 97.6 F | SYSTOLIC BLOOD PRESSURE: 170 MMHG | HEIGHT: 60 IN | WEIGHT: 139 LBS | HEART RATE: 68 BPM | DIASTOLIC BLOOD PRESSURE: 73 MMHG | BODY MASS INDEX: 27.29 KG/M2

## 2023-09-26 PROCEDURE — 93280 PM DEVICE PROGR EVAL DUAL: CPT

## 2023-10-03 ENCOUNTER — RX RENEWAL (OUTPATIENT)
Age: 88
End: 2023-10-03

## 2023-10-03 ENCOUNTER — APPOINTMENT (OUTPATIENT)
Dept: HEART AND VASCULAR | Facility: CLINIC | Age: 88
End: 2023-10-03

## 2023-10-04 ENCOUNTER — RX RENEWAL (OUTPATIENT)
Age: 88
End: 2023-10-04

## 2023-10-04 RX ORDER — HYDROCHLOROTHIAZIDE 25 MG/1
25 TABLET ORAL
Qty: 90 | Refills: 3 | Status: ACTIVE | COMMUNITY
Start: 2022-08-24 | End: 1900-01-01

## 2023-10-05 LAB — NT-PROBNP SERPL-MCNC: 683 PG/ML

## 2023-10-06 LAB
ALBUMIN SERPL ELPH-MCNC: 4.2 G/DL
ALP BLD-CCNC: 80 U/L
ALT SERPL-CCNC: 9 U/L
ANION GAP SERPL CALC-SCNC: 14 MMOL/L
AST SERPL-CCNC: 15 U/L
BILIRUB SERPL-MCNC: 0.5 MG/DL
BUN SERPL-MCNC: 14 MG/DL
CALCIUM SERPL-MCNC: 9.4 MG/DL
CHLORIDE SERPL-SCNC: 106 MMOL/L
CO2 SERPL-SCNC: 24 MMOL/L
CREAT SERPL-MCNC: 1.05 MG/DL
EGFR: 51 ML/MIN/1.73M2
GLUCOSE SERPL-MCNC: 93 MG/DL
POTASSIUM SERPL-SCNC: 4.7 MMOL/L
PROT SERPL-MCNC: 7.1 G/DL
SODIUM SERPL-SCNC: 144 MMOL/L

## 2023-10-16 ENCOUNTER — OUTPATIENT (OUTPATIENT)
Dept: OUTPATIENT SERVICES | Facility: HOSPITAL | Age: 88
LOS: 1 days | End: 2023-10-16
Payer: MEDICARE

## 2023-10-16 ENCOUNTER — RESULT REVIEW (OUTPATIENT)
Age: 88
End: 2023-10-16

## 2023-10-16 ENCOUNTER — APPOINTMENT (OUTPATIENT)
Dept: ULTRASOUND IMAGING | Facility: HOSPITAL | Age: 88
End: 2023-10-16
Payer: MEDICARE

## 2023-10-16 PROCEDURE — 76770 US EXAM ABDO BACK WALL COMP: CPT

## 2023-10-16 PROCEDURE — 76770 US EXAM ABDO BACK WALL COMP: CPT | Mod: 26

## 2023-10-20 ENCOUNTER — INPATIENT (INPATIENT)
Facility: HOSPITAL | Age: 88
LOS: 2 days | Discharge: HOME | DRG: 178 | End: 2023-10-23
Attending: INTERNAL MEDICINE | Admitting: STUDENT IN AN ORGANIZED HEALTH CARE EDUCATION/TRAINING PROGRAM
Payer: MEDICARE

## 2023-10-20 VITALS
HEART RATE: 69 BPM | TEMPERATURE: 100 F | DIASTOLIC BLOOD PRESSURE: 73 MMHG | OXYGEN SATURATION: 95 % | WEIGHT: 139.99 LBS | RESPIRATION RATE: 16 BRPM | SYSTOLIC BLOOD PRESSURE: 134 MMHG

## 2023-10-20 DIAGNOSIS — M62.82 RHABDOMYOLYSIS: ICD-10-CM

## 2023-10-20 DIAGNOSIS — U07.1 COVID-19: ICD-10-CM

## 2023-10-20 DIAGNOSIS — E03.9 HYPOTHYROIDISM, UNSPECIFIED: ICD-10-CM

## 2023-10-20 DIAGNOSIS — I10 ESSENTIAL (PRIMARY) HYPERTENSION: ICD-10-CM
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
## 2023-10-20 DIAGNOSIS — Z29.9 ENCOUNTER FOR PROPHYLACTIC MEASURES, UNSPECIFIED: ICD-10-CM

## 2023-10-20 DIAGNOSIS — I48.91 UNSPECIFIED ATRIAL FIBRILLATION: ICD-10-CM

## 2023-10-20 DIAGNOSIS — I25.10 ATHEROSCLEROTIC HEART DISEASE OF NATIVE CORONARY ARTERY WITHOUT ANGINA PECTORIS: ICD-10-CM

## 2023-10-20 LAB
ALBUMIN SERPL ELPH-MCNC: 3.5 G/DL — SIGNIFICANT CHANGE UP (ref 3.3–5)
ALBUMIN SERPL ELPH-MCNC: 3.5 G/DL — SIGNIFICANT CHANGE UP (ref 3.3–5)
ALP SERPL-CCNC: 72 U/L — SIGNIFICANT CHANGE UP (ref 40–120)
ALP SERPL-CCNC: 72 U/L — SIGNIFICANT CHANGE UP (ref 40–120)
ALT FLD-CCNC: 9 U/L — LOW (ref 10–45)
ALT FLD-CCNC: 9 U/L — LOW (ref 10–45)
ANION GAP SERPL CALC-SCNC: 9 MMOL/L — SIGNIFICANT CHANGE UP (ref 5–17)
ANION GAP SERPL CALC-SCNC: 9 MMOL/L — SIGNIFICANT CHANGE UP (ref 5–17)
APTT BLD: 63.4 SEC — HIGH (ref 24.5–35.6)
APTT BLD: 63.4 SEC — HIGH (ref 24.5–35.6)
AST SERPL-CCNC: 27 U/L — SIGNIFICANT CHANGE UP (ref 10–40)
AST SERPL-CCNC: 27 U/L — SIGNIFICANT CHANGE UP (ref 10–40)
BASOPHILS # BLD AUTO: 0.06 K/UL — SIGNIFICANT CHANGE UP (ref 0–0.2)
BASOPHILS # BLD AUTO: 0.06 K/UL — SIGNIFICANT CHANGE UP (ref 0–0.2)
BASOPHILS NFR BLD AUTO: 0.7 % — SIGNIFICANT CHANGE UP (ref 0–2)
BASOPHILS NFR BLD AUTO: 0.7 % — SIGNIFICANT CHANGE UP (ref 0–2)
BILIRUB SERPL-MCNC: 0.5 MG/DL — SIGNIFICANT CHANGE UP (ref 0.2–1.2)
BILIRUB SERPL-MCNC: 0.5 MG/DL — SIGNIFICANT CHANGE UP (ref 0.2–1.2)
BUN SERPL-MCNC: 14 MG/DL — SIGNIFICANT CHANGE UP (ref 7–23)
BUN SERPL-MCNC: 14 MG/DL — SIGNIFICANT CHANGE UP (ref 7–23)
CALCIUM SERPL-MCNC: 9.2 MG/DL — SIGNIFICANT CHANGE UP (ref 8.4–10.5)
CALCIUM SERPL-MCNC: 9.2 MG/DL — SIGNIFICANT CHANGE UP (ref 8.4–10.5)
CHLORIDE SERPL-SCNC: 102 MMOL/L — SIGNIFICANT CHANGE UP (ref 96–108)
CHLORIDE SERPL-SCNC: 102 MMOL/L — SIGNIFICANT CHANGE UP (ref 96–108)
CK SERPL-CCNC: 762 U/L — HIGH (ref 25–170)
CK SERPL-CCNC: 762 U/L — HIGH (ref 25–170)
CO2 SERPL-SCNC: 25 MMOL/L — SIGNIFICANT CHANGE UP (ref 22–31)
CO2 SERPL-SCNC: 25 MMOL/L — SIGNIFICANT CHANGE UP (ref 22–31)
CREAT SERPL-MCNC: 0.99 MG/DL — SIGNIFICANT CHANGE UP (ref 0.5–1.3)
CREAT SERPL-MCNC: 0.99 MG/DL — SIGNIFICANT CHANGE UP (ref 0.5–1.3)
EGFR: 55 ML/MIN/1.73M2 — LOW
EGFR: 55 ML/MIN/1.73M2 — LOW
EOSINOPHIL # BLD AUTO: 0.04 K/UL — SIGNIFICANT CHANGE UP (ref 0–0.5)
EOSINOPHIL # BLD AUTO: 0.04 K/UL — SIGNIFICANT CHANGE UP (ref 0–0.5)
EOSINOPHIL NFR BLD AUTO: 0.5 % — SIGNIFICANT CHANGE UP (ref 0–6)
EOSINOPHIL NFR BLD AUTO: 0.5 % — SIGNIFICANT CHANGE UP (ref 0–6)
FLUAV AG NPH QL: SIGNIFICANT CHANGE UP
FLUAV AG NPH QL: SIGNIFICANT CHANGE UP
FLUBV AG NPH QL: SIGNIFICANT CHANGE UP
FLUBV AG NPH QL: SIGNIFICANT CHANGE UP
GLUCOSE SERPL-MCNC: 100 MG/DL — HIGH (ref 70–99)
GLUCOSE SERPL-MCNC: 100 MG/DL — HIGH (ref 70–99)
HCT VFR BLD CALC: 38.6 % — SIGNIFICANT CHANGE UP (ref 34.5–45)
HCT VFR BLD CALC: 38.6 % — SIGNIFICANT CHANGE UP (ref 34.5–45)
HGB BLD-MCNC: 12.8 G/DL — SIGNIFICANT CHANGE UP (ref 11.5–15.5)
HGB BLD-MCNC: 12.8 G/DL — SIGNIFICANT CHANGE UP (ref 11.5–15.5)
IMM GRANULOCYTES NFR BLD AUTO: 0.3 % — SIGNIFICANT CHANGE UP (ref 0–0.9)
IMM GRANULOCYTES NFR BLD AUTO: 0.3 % — SIGNIFICANT CHANGE UP (ref 0–0.9)
INR BLD: 1.24 — HIGH (ref 0.85–1.18)
INR BLD: 1.24 — HIGH (ref 0.85–1.18)
LACTATE SERPL-SCNC: 1.3 MMOL/L — SIGNIFICANT CHANGE UP (ref 0.5–2)
LACTATE SERPL-SCNC: 1.3 MMOL/L — SIGNIFICANT CHANGE UP (ref 0.5–2)
LYMPHOCYTES # BLD AUTO: 1.63 K/UL — SIGNIFICANT CHANGE UP (ref 1–3.3)
LYMPHOCYTES # BLD AUTO: 1.63 K/UL — SIGNIFICANT CHANGE UP (ref 1–3.3)
LYMPHOCYTES # BLD AUTO: 18.5 % — SIGNIFICANT CHANGE UP (ref 13–44)
LYMPHOCYTES # BLD AUTO: 18.5 % — SIGNIFICANT CHANGE UP (ref 13–44)
MCHC RBC-ENTMCNC: 30.2 PG — SIGNIFICANT CHANGE UP (ref 27–34)
MCHC RBC-ENTMCNC: 30.2 PG — SIGNIFICANT CHANGE UP (ref 27–34)
MCHC RBC-ENTMCNC: 33.2 GM/DL — SIGNIFICANT CHANGE UP (ref 32–36)
MCHC RBC-ENTMCNC: 33.2 GM/DL — SIGNIFICANT CHANGE UP (ref 32–36)
MCV RBC AUTO: 91 FL — SIGNIFICANT CHANGE UP (ref 80–100)
MCV RBC AUTO: 91 FL — SIGNIFICANT CHANGE UP (ref 80–100)
MONOCYTES # BLD AUTO: 1.14 K/UL — HIGH (ref 0–0.9)
MONOCYTES # BLD AUTO: 1.14 K/UL — HIGH (ref 0–0.9)
MONOCYTES NFR BLD AUTO: 13 % — SIGNIFICANT CHANGE UP (ref 2–14)
MONOCYTES NFR BLD AUTO: 13 % — SIGNIFICANT CHANGE UP (ref 2–14)
NEUTROPHILS # BLD AUTO: 5.89 K/UL — SIGNIFICANT CHANGE UP (ref 1.8–7.4)
NEUTROPHILS # BLD AUTO: 5.89 K/UL — SIGNIFICANT CHANGE UP (ref 1.8–7.4)
NEUTROPHILS NFR BLD AUTO: 67 % — SIGNIFICANT CHANGE UP (ref 43–77)
NEUTROPHILS NFR BLD AUTO: 67 % — SIGNIFICANT CHANGE UP (ref 43–77)
NRBC # BLD: 0 /100 WBCS — SIGNIFICANT CHANGE UP (ref 0–0)
NRBC # BLD: 0 /100 WBCS — SIGNIFICANT CHANGE UP (ref 0–0)
PLATELET # BLD AUTO: 209 K/UL — SIGNIFICANT CHANGE UP (ref 150–400)
PLATELET # BLD AUTO: 209 K/UL — SIGNIFICANT CHANGE UP (ref 150–400)
POTASSIUM SERPL-MCNC: 4 MMOL/L — SIGNIFICANT CHANGE UP (ref 3.5–5.3)
POTASSIUM SERPL-MCNC: 4 MMOL/L — SIGNIFICANT CHANGE UP (ref 3.5–5.3)
POTASSIUM SERPL-SCNC: 4 MMOL/L — SIGNIFICANT CHANGE UP (ref 3.5–5.3)
POTASSIUM SERPL-SCNC: 4 MMOL/L — SIGNIFICANT CHANGE UP (ref 3.5–5.3)
PROT SERPL-MCNC: 6.9 G/DL — SIGNIFICANT CHANGE UP (ref 6–8.3)
PROT SERPL-MCNC: 6.9 G/DL — SIGNIFICANT CHANGE UP (ref 6–8.3)
PROTHROM AB SERPL-ACNC: 14 SEC — HIGH (ref 9.5–13)
PROTHROM AB SERPL-ACNC: 14 SEC — HIGH (ref 9.5–13)
RBC # BLD: 4.24 M/UL — SIGNIFICANT CHANGE UP (ref 3.8–5.2)
RBC # BLD: 4.24 M/UL — SIGNIFICANT CHANGE UP (ref 3.8–5.2)
RBC # FLD: 13.2 % — SIGNIFICANT CHANGE UP (ref 10.3–14.5)
RBC # FLD: 13.2 % — SIGNIFICANT CHANGE UP (ref 10.3–14.5)
RSV RNA NPH QL NAA+NON-PROBE: SIGNIFICANT CHANGE UP
RSV RNA NPH QL NAA+NON-PROBE: SIGNIFICANT CHANGE UP
SARS-COV-2 RNA SPEC QL NAA+PROBE: DETECTED
SARS-COV-2 RNA SPEC QL NAA+PROBE: DETECTED
SODIUM SERPL-SCNC: 136 MMOL/L — SIGNIFICANT CHANGE UP (ref 135–145)
SODIUM SERPL-SCNC: 136 MMOL/L — SIGNIFICANT CHANGE UP (ref 135–145)
T4 AB SER-ACNC: 8.72 UG/DL — SIGNIFICANT CHANGE UP (ref 4.5–11.7)
T4 AB SER-ACNC: 8.72 UG/DL — SIGNIFICANT CHANGE UP (ref 4.5–11.7)
TSH SERPL-MCNC: 0.73 UIU/ML — SIGNIFICANT CHANGE UP (ref 0.27–4.2)
TSH SERPL-MCNC: 0.73 UIU/ML — SIGNIFICANT CHANGE UP (ref 0.27–4.2)
WBC # BLD: 8.79 K/UL — SIGNIFICANT CHANGE UP (ref 3.8–10.5)
WBC # BLD: 8.79 K/UL — SIGNIFICANT CHANGE UP (ref 3.8–10.5)
WBC # FLD AUTO: 8.79 K/UL — SIGNIFICANT CHANGE UP (ref 3.8–10.5)
WBC # FLD AUTO: 8.79 K/UL — SIGNIFICANT CHANGE UP (ref 3.8–10.5)

## 2023-10-20 PROCEDURE — 73080 X-RAY EXAM OF ELBOW: CPT | Mod: 26,LT

## 2023-10-20 PROCEDURE — 72100 X-RAY EXAM L-S SPINE 2/3 VWS: CPT | Mod: 26

## 2023-10-20 PROCEDURE — 73060 X-RAY EXAM OF HUMERUS: CPT | Mod: 26,LT

## 2023-10-20 PROCEDURE — 70450 CT HEAD/BRAIN W/O DYE: CPT | Mod: 26,MA

## 2023-10-20 PROCEDURE — 71045 X-RAY EXAM CHEST 1 VIEW: CPT | Mod: 26

## 2023-10-20 PROCEDURE — 99285 EMERGENCY DEPT VISIT HI MDM: CPT

## 2023-10-20 PROCEDURE — 73030 X-RAY EXAM OF SHOULDER: CPT | Mod: 26

## 2023-10-20 PROCEDURE — 72125 CT NECK SPINE W/O DYE: CPT | Mod: 26,MA

## 2023-10-20 PROCEDURE — 72170 X-RAY EXAM OF PELVIS: CPT | Mod: 26

## 2023-10-20 PROCEDURE — 99223 1ST HOSP IP/OBS HIGH 75: CPT

## 2023-10-20 RX ORDER — LANOLIN ALCOHOL/MO/W.PET/CERES
3 CREAM (GRAM) TOPICAL AT BEDTIME
Refills: 0 | Status: DISCONTINUED | OUTPATIENT
Start: 2023-10-20 | End: 2023-10-23

## 2023-10-20 RX ORDER — ATORVASTATIN CALCIUM 80 MG/1
20 TABLET, FILM COATED ORAL AT BEDTIME
Refills: 0 | Status: DISCONTINUED | OUTPATIENT
Start: 2023-10-20 | End: 2023-10-23

## 2023-10-20 RX ORDER — ACETAMINOPHEN 500 MG
1000 TABLET ORAL ONCE
Refills: 0 | Status: COMPLETED | OUTPATIENT
Start: 2023-10-20 | End: 2023-10-20

## 2023-10-20 RX ORDER — FLUTICASONE PROPIONATE 50 MCG
1 SPRAY, SUSPENSION NASAL
Qty: 0 | Refills: 0 | DISCHARGE

## 2023-10-20 RX ORDER — METOPROLOL TARTRATE 50 MG
75 TABLET ORAL EVERY 12 HOURS
Refills: 0 | Status: DISCONTINUED | OUTPATIENT
Start: 2023-10-21 | End: 2023-10-23

## 2023-10-20 RX ORDER — REMDESIVIR 5 MG/ML
100 INJECTION INTRAVENOUS ONCE
Refills: 0 | Status: DISCONTINUED | OUTPATIENT
Start: 2023-10-21 | End: 2023-10-23

## 2023-10-20 RX ORDER — REMDESIVIR 5 MG/ML
200 INJECTION INTRAVENOUS ONCE
Refills: 0 | Status: COMPLETED | OUTPATIENT
Start: 2023-10-20 | End: 2023-10-21

## 2023-10-20 RX ORDER — CEFTRIAXONE 500 MG/1
1000 INJECTION, POWDER, FOR SOLUTION INTRAMUSCULAR; INTRAVENOUS ONCE
Refills: 0 | Status: COMPLETED | OUTPATIENT
Start: 2023-10-20 | End: 2023-10-20

## 2023-10-20 RX ORDER — REMDESIVIR 5 MG/ML
100 INJECTION INTRAVENOUS ONCE
Refills: 0 | Status: COMPLETED | OUTPATIENT
Start: 2023-10-22 | End: 2023-10-22

## 2023-10-20 RX ORDER — BUDESONIDE AND FORMOTEROL FUMARATE DIHYDRATE 160; 4.5 UG/1; UG/1
2 AEROSOL RESPIRATORY (INHALATION)
Refills: 0 | Status: DISCONTINUED | OUTPATIENT
Start: 2023-10-20 | End: 2023-10-23

## 2023-10-20 RX ORDER — ATORVASTATIN CALCIUM 80 MG/1
1 TABLET, FILM COATED ORAL
Refills: 0 | DISCHARGE

## 2023-10-20 RX ORDER — FLUTICASONE PROPIONATE AND SALMETEROL 50; 250 UG/1; UG/1
1 POWDER ORAL; RESPIRATORY (INHALATION)
Qty: 0 | Refills: 0 | DISCHARGE

## 2023-10-20 RX ORDER — LEVOTHYROXINE SODIUM 125 MCG
75 TABLET ORAL DAILY
Refills: 0 | Status: DISCONTINUED | OUTPATIENT
Start: 2023-10-20 | End: 2023-10-23

## 2023-10-20 RX ORDER — ONDANSETRON 8 MG/1
4 TABLET, FILM COATED ORAL EVERY 8 HOURS
Refills: 0 | Status: DISCONTINUED | OUTPATIENT
Start: 2023-10-20 | End: 2023-10-23

## 2023-10-20 RX ORDER — SODIUM CHLORIDE 9 MG/ML
1000 INJECTION INTRAMUSCULAR; INTRAVENOUS; SUBCUTANEOUS ONCE
Refills: 0 | Status: COMPLETED | OUTPATIENT
Start: 2023-10-20 | End: 2023-10-20

## 2023-10-20 RX ORDER — DABIGATRAN ETEXILATE MESYLATE 150 MG/1
150 CAPSULE ORAL EVERY 12 HOURS
Refills: 0 | Status: DISCONTINUED | OUTPATIENT
Start: 2023-10-21 | End: 2023-10-23

## 2023-10-20 RX ORDER — ACETAMINOPHEN 500 MG
650 TABLET ORAL EVERY 6 HOURS
Refills: 0 | Status: DISCONTINUED | OUTPATIENT
Start: 2023-10-20 | End: 2023-10-23

## 2023-10-20 RX ADMIN — SODIUM CHLORIDE 1000 MILLILITER(S): 9 INJECTION INTRAMUSCULAR; INTRAVENOUS; SUBCUTANEOUS at 18:50

## 2023-10-20 RX ADMIN — CEFTRIAXONE 100 MILLIGRAM(S): 500 INJECTION, POWDER, FOR SOLUTION INTRAMUSCULAR; INTRAVENOUS at 19:53

## 2023-10-20 RX ADMIN — SODIUM CHLORIDE 1000 MILLILITER(S): 9 INJECTION INTRAMUSCULAR; INTRAVENOUS; SUBCUTANEOUS at 21:10

## 2023-10-20 RX ADMIN — Medication 1000 MILLIGRAM(S): at 21:10

## 2023-10-20 RX ADMIN — Medication 400 MILLIGRAM(S): at 18:50

## 2023-10-20 RX ADMIN — CEFTRIAXONE 1000 MILLIGRAM(S): 500 INJECTION, POWDER, FOR SOLUTION INTRAMUSCULAR; INTRAVENOUS at 21:10

## 2023-10-20 NOTE — H&P ADULT - NSHPLABSRESULTS_GEN_ALL_CORE
LABS:                        12.8   8.79  )-----------( 209      ( 20 Oct 2023 19:14 )             38.6     10-20    136  |  102  |  14  ----------------------------<  100<H>  4.0   |  25  |  0.99    Ca    9.2      20 Oct 2023 19:14    TPro  6.9  /  Alb  3.5  /  TBili  0.5  /  DBili  x   /  AST  27  /  ALT  9<L>  /  AlkPhos  72  10-20    PT/INR - ( 20 Oct 2023 19:14 )   PT: 14.0 sec;   INR: 1.24          PTT - ( 20 Oct 2023 19:14 )  PTT:63.4 sec  Urinalysis Basic - ( 20 Oct 2023 19:14 )    Color: x / Appearance: x / SG: x / pH: x  Gluc: 100 mg/dL / Ketone: x  / Bili: x / Urobili: x   Blood: x / Protein: x / Nitrite: x   Leuk Esterase: x / RBC: x / WBC x   Sq Epi: x / Non Sq Epi: x / Bacteria: x          RADIOLOGY & ADDITIONAL TESTS:    MEDICATIONS  (STANDING):    MEDICATIONS  (PRN):

## 2023-10-20 NOTE — ED ADULT TRIAGE NOTE - CHIEF COMPLAINT QUOTE
pt biba from home for eval mechanical trip and fall getting out of bed to restroom 2 nights ago. +head trauma no LOC, pt on pradaxa. pt fell onto left side complaining of pain to left shoulder and head

## 2023-10-20 NOTE — H&P ADULT - NSHPREVIEWOFSYSTEMS_GEN_ALL_CORE
Pt denies prodromal sx, no CP, SOB, dizziness, lightheadedness before fall.   Pt denies syncope, urinary/fecal incontinence.  Pt reports pain and weakness after fall, however she states she was mainly limited by pain.     Patient states she is currently without pain and is comfortable, would like to go home as soon as possible.     Patient denies h/n/v/d, fever, chills, cp, palpitations, sob, abd pain, leg swelling, rashes, dysuria, and changes in BM.

## 2023-10-20 NOTE — H&P ADULT - PROBLEM SELECTOR PLAN 1
Slipped and fell when moving from high bed to stool. No prodromal sz, syncope, motor sz sx. Down for 11 hours.   Mechanical v.s. Vasovagal v.s. Orthostatic v.s. Cardiac etiology v.s. Infectious  +Head trauma, CTH, CT C-spine no acute path.     - f/u XR elbow read iso initial L sided elbow pain  - EP interrogate PPM in AM  - Orthostatic BP measurement  - PT Slipped and fell when moving from high bed to stool. No prodromal sz, syncope, motor sz sx. Down for 11 hours.   Mechanical v.s. Vasovagal v.s. Orthostatic v.s. Cardiac etiology v.s. Infectious iso Covid  +Head trauma, CTH, CT C-spine no acute path.     - f/u XR elbow read iso initial L sided elbow pain, now comfortable without pain  - EP interrogate PPM in AM  - Orthostatic BP measurement  - PT

## 2023-10-20 NOTE — ED ADULT NURSE REASSESSMENT NOTE - NS ED NURSE REASSESS COMMENT FT1
Assumed care of pt. Pt informed she is covid +, moved to isolation room. Pt on [facility] for urine collection. AAOx4, VSS on RA, NAD. Endorsing mild L arm pain r/t fall. Denies all other complaints at this time.

## 2023-10-20 NOTE — ED PROVIDER NOTE - OBJECTIVE STATEMENT
history of htn, afib on pradaxa, hypothyroidism, here with fall. Reports trying to get out of high bed yesterday morning, stepping onto stool that slipped causing her to fall onto hardwood floor. Hit head/left side but no loc. However, unable to get up for about 11 hours. Too weak/ had pain on left side. Eventually able to help herself into bed where she stayed until family found her this afternoon. Unable to get up with assistance so ems called. Denies fever/chills, n/v, abdominal pain, chest pain, urinary symptoms, cough. Mostly now with pain left arm/elbow, head.

## 2023-10-20 NOTE — H&P ADULT - PROBLEM SELECTOR PLAN 2
Was down for ~11 hours before family found her iso fall, pain and weakness. CK mild elevation to 762, UA negative blood and RBC S/p 2L NS in ED  - AM CK  - PT recs

## 2023-10-20 NOTE — ED ADULT NURSE NOTE - NS ED NURSE REPORT GIVEN TO FT
Called pt to reschedule appt w/ provider per dr woo. . Provider will not be in clinic 6/8. lmovm for pt to call office   Lilian LOPEZ Normal

## 2023-10-20 NOTE — H&P ADULT - ATTENDING COMMENTS
87 yo F with PMHx HTN, sarcoidosis, TIA, AFib (Pradaxa), SSS (s/p PPM), hypothyroidism BIBEMS from home with L shoulder and head pain following mechanical fall 2d prior admitted for pain control and PT evaluation.      #Mechanical fall – EKG on admission showing paced rhythm, HR 56, . No cardiac sx. Denies palpitations, chest pain, SOB. No pre-syncopal sx. No LOC. CTH/cervical spine without acute findings, only chronic ischemic changes with volume loss in the brain and multilevel degenerative changes in the spine. Prelim XR of L elbow negative for fracture or dislocation. Continue Pradaxa for now but will need collateral from outpatient cardiologist given recurrent falls. PPM interrogation. F/U orthostatic. Fall precautions. PT consult.      #COVID – Not meeting SIRS apart from T 102F rectal but without leukocytosis/bandemia, tachycardia. Lactate normal. No other localized sx to suggest source of infection however found to be COVID+. CXR with increased perihilar opacification likely in setting of known sarcoidosis, no consolidations/effusions. Not requiring O2 supplementation, no indication for steroids. Continue remdesivir in high-risk patient with risk factors.      Agree with remainder of resident plan as above.

## 2023-10-20 NOTE — ED ADULT NURSE NOTE - DRUG PRE-SCREENING (DAST -1)
Statement Selected

## 2023-10-20 NOTE — H&P ADULT - PROBLEM SELECTOR PLAN 3
Covid+ on admission, no cough, SOB. Saturating well on RA. No consolidations noted on CXR on author, pulmonary physical exam WNL.     - Monitor off abx  - Isolation as per protocol Covid+ on admission, no cough, SOB. Saturating well on RA. No consolidations noted on CXR on author, pulmonary physical exam WNL.     - Remdesivir 3d course iso comorbidities  - Monitor off abx  - Isolation as per protocol

## 2023-10-20 NOTE — ED ADULT NURSE NOTE - NSFALLHARMRISKINTERV_ED_ALL_ED
Assistance OOB with selected safe patient handling equipment if applicable/Communicate risk of Fall with Harm to all staff, patient, and family/Provide visual cue: red socks, yellow wristband, yellow gown, etc/Reinforce activity limits and safety measures with patient and family/Bed in lowest position, wheels locked, appropriate side rails in place/Call bell, personal items and telephone in reach/Instruct patient to call for assistance before getting out of bed/chair/stretcher/Non-slip footwear applied when patient is off stretcher/[place] to call system/Physically safe environment - no spills, clutter or unnecessary equipment/Purposeful Proactive Rounding/Room/bathroom lighting operational, light cord in reach

## 2023-10-20 NOTE — PATIENT PROFILE ADULT - IS THERE A SUSPICION OF ABUSE/NEGLIGENCE?
"[facility] [facility]. no

## 2023-10-20 NOTE — ED PROVIDER NOTE - MUSCULOSKELETAL, MLM
Spine appears normal, pain middle of neck/laterally, left shoulder/elbow, worse with ROM, right lower lumbar tenderness. normal rom of hips/legs without pain.

## 2023-10-20 NOTE — H&P ADULT - PROBLEM SELECTOR PLAN 4
#Sick Sinus syndrome  s/p PPM     - C/w home Pradaxa 150 Q12 #Sick Sinus syndrome  s/p PPM     - C/w home Pradaxa 150 Q12  - Get collateral from cardiology regarding PPM, consider risk v.s. benefit discussion is full dose AC and recurrent falls

## 2023-10-20 NOTE — PATIENT PROFILE ADULT - NSPROGENOTHERPROVIDER_GEN_A_NUR
Pt reported pain to neck, right hip and left hand this morning. Given oxycodone 10mg. Left wrist/hand in cast. CMS intact, tenderness/pain. C collar worn when up. Pt moving with assist of one to commode. Pt very motivated to perform own cares.    none

## 2023-10-20 NOTE — PATIENT PROFILE ADULT - FALL HARM RISK - RISK INTERVENTIONS

## 2023-10-20 NOTE — ED PROVIDER NOTE - CARE PLAN
1 Principal Discharge DX:	Generalized weakness  Secondary Diagnosis:	2019 novel coronavirus disease (COVID-19)  Secondary Diagnosis:	Fall

## 2023-10-20 NOTE — ED PROVIDER NOTE - WR ORDER ID 4
Sofiya Richards is ready to schedule surgery. She has an appointment with Dr. Yesenia Puente on September 12, so would like her surgery anytime after 9/12/17. I will initiate the scheduling process. Once we have a surgery date, I will order her hibiclens soap and Bactroban nasal ointment to be used prior to surgery. Also, Dr. Diana Prasad will consult with Dr. Yesenia Puente, once the surgery date has been set. [id number]

## 2023-10-20 NOTE — ED ADULT NURSE NOTE - OBJECTIVE STATEMENT
patient presents to ED with mechanical fall w/ head strike w/o LOC, is complaining of back of head pain, Lt. upper arm pain. denies cp, sob, dizziness, n/v/abd pain. No bruises, swelling, laceration, dislocation.

## 2023-10-20 NOTE — ED PROVIDER NOTE - CLINICAL SUMMARY MEDICAL DECISION MAKING FREE TEXT BOX
fall out of bed yesterday, likely mechanical per reports but unable to get up x 12 hrs. head/neck/arm pain on exam. imaging ordered to r/o fracture / bleed and neg. found to be febrile.  labs/ blood and urine cultures obtained and started fluid bolus along with empiric ceftriaxone for possible uti or respiratory source. workup with + covid test, likely source of fever and gen weakness. lives alone, unable to care for self, admit for pt/ further treatment/ possible maddi

## 2023-10-20 NOTE — H&P ADULT - HISTORY OF PRESENT ILLNESS
88F PMHx Sarcoidosis, HTN, Afib (c/b TIA no residual deficits, on pradaxa), hypothyroidism, , CAD, Sick sinus syndrome s/p PPM placement,  p/w fall iso getting out of bed (elevated off of ground), she stepped onto a stool however she slipped and fell onto a hardwood floor. She hit her head on her L side however did not syncopize. Pt was unable to get up for 11 hours, she had too much pain on her L side and felt too weak. She climbed back into her bed and her family found her there. Pt was unable to get up with assistance so EMS called.     Pt denies prodromal sx, no CP, SOB, dizziness, lightheadedness before fall.   Pt denies syncope, urinary/fecal incontinence.  Pt reports pain and weakness after fall, however she states she was mainly limited by pain.     Patient has fallen 2x before, never hospitalized for it. Most recently a few weeks ago, with similar characteristics as this fall ?mechanical fall.   ED Vitals: Tmax 102.7 rectal --> 98.7F oral HR 69, /73. SPO2 95 RA  ED Labs: WBC 8.8 diff WNL, Hgb 12.8, Plt 209. CMP notable for eGFR 55 iso BUN/Cr 14/0.99 otherwise WNL. Lactate 1.3. Mild CK elevation to 762. TSH WNL 0.72, T4 WNL 8.7.   ED Studies: UA neg WBC/Bacteria/LE/N. Negative blood and RBC. RVP Covid positive.   CTH: No acute pathology. +Scattered chronic ischemic changes.   CT C-spine: No fracture.   EKG: Sinus bradycardia, no clear a-pacer spikes noted per author  ED Interventions: Ofirmev 1g x1, CTX 1g x1, 2L NS

## 2023-10-21 LAB
ALBUMIN SERPL ELPH-MCNC: 3.2 G/DL — LOW (ref 3.3–5)
ALP SERPL-CCNC: 60 U/L — SIGNIFICANT CHANGE UP (ref 40–120)
ALP SERPL-CCNC: 60 U/L — SIGNIFICANT CHANGE UP (ref 40–120)
ALP SERPL-CCNC: 61 U/L — SIGNIFICANT CHANGE UP (ref 40–120)
ALP SERPL-CCNC: 61 U/L — SIGNIFICANT CHANGE UP (ref 40–120)
ALT FLD-CCNC: 10 U/L — SIGNIFICANT CHANGE UP (ref 10–45)
ALT FLD-CCNC: 10 U/L — SIGNIFICANT CHANGE UP (ref 10–45)
ALT FLD-CCNC: 13 U/L — SIGNIFICANT CHANGE UP (ref 10–45)
ALT FLD-CCNC: 13 U/L — SIGNIFICANT CHANGE UP (ref 10–45)
ANION GAP SERPL CALC-SCNC: 12 MMOL/L — SIGNIFICANT CHANGE UP (ref 5–17)
ANION GAP SERPL CALC-SCNC: 12 MMOL/L — SIGNIFICANT CHANGE UP (ref 5–17)
ANION GAP SERPL CALC-SCNC: 9 MMOL/L — SIGNIFICANT CHANGE UP (ref 5–17)
ANION GAP SERPL CALC-SCNC: 9 MMOL/L — SIGNIFICANT CHANGE UP (ref 5–17)
AST SERPL-CCNC: 31 U/L — SIGNIFICANT CHANGE UP (ref 10–40)
AST SERPL-CCNC: 31 U/L — SIGNIFICANT CHANGE UP (ref 10–40)
AST SERPL-CCNC: 36 U/L — SIGNIFICANT CHANGE UP (ref 10–40)
AST SERPL-CCNC: 36 U/L — SIGNIFICANT CHANGE UP (ref 10–40)
BASOPHILS # BLD AUTO: 0.05 K/UL — SIGNIFICANT CHANGE UP (ref 0–0.2)
BASOPHILS # BLD AUTO: 0.05 K/UL — SIGNIFICANT CHANGE UP (ref 0–0.2)
BASOPHILS NFR BLD AUTO: 0.6 % — SIGNIFICANT CHANGE UP (ref 0–2)
BASOPHILS NFR BLD AUTO: 0.6 % — SIGNIFICANT CHANGE UP (ref 0–2)
BILIRUB SERPL-MCNC: 0.2 MG/DL — SIGNIFICANT CHANGE UP (ref 0.2–1.2)
BILIRUB SERPL-MCNC: 0.2 MG/DL — SIGNIFICANT CHANGE UP (ref 0.2–1.2)
BILIRUB SERPL-MCNC: 0.4 MG/DL — SIGNIFICANT CHANGE UP (ref 0.2–1.2)
BILIRUB SERPL-MCNC: 0.4 MG/DL — SIGNIFICANT CHANGE UP (ref 0.2–1.2)
BUN SERPL-MCNC: 12 MG/DL — SIGNIFICANT CHANGE UP (ref 7–23)
BUN SERPL-MCNC: 12 MG/DL — SIGNIFICANT CHANGE UP (ref 7–23)
BUN SERPL-MCNC: 14 MG/DL — SIGNIFICANT CHANGE UP (ref 7–23)
BUN SERPL-MCNC: 14 MG/DL — SIGNIFICANT CHANGE UP (ref 7–23)
CALCIUM SERPL-MCNC: 8.3 MG/DL — LOW (ref 8.4–10.5)
CALCIUM SERPL-MCNC: 8.3 MG/DL — LOW (ref 8.4–10.5)
CALCIUM SERPL-MCNC: 8.4 MG/DL — SIGNIFICANT CHANGE UP (ref 8.4–10.5)
CALCIUM SERPL-MCNC: 8.4 MG/DL — SIGNIFICANT CHANGE UP (ref 8.4–10.5)
CHLORIDE SERPL-SCNC: 108 MMOL/L — SIGNIFICANT CHANGE UP (ref 96–108)
CK SERPL-CCNC: 928 U/L — HIGH (ref 25–170)
CK SERPL-CCNC: 928 U/L — HIGH (ref 25–170)
CO2 SERPL-SCNC: 19 MMOL/L — LOW (ref 22–31)
CO2 SERPL-SCNC: 19 MMOL/L — LOW (ref 22–31)
CO2 SERPL-SCNC: 20 MMOL/L — LOW (ref 22–31)
CO2 SERPL-SCNC: 20 MMOL/L — LOW (ref 22–31)
CREAT SERPL-MCNC: 0.91 MG/DL — SIGNIFICANT CHANGE UP (ref 0.5–1.3)
CREAT SERPL-MCNC: 0.91 MG/DL — SIGNIFICANT CHANGE UP (ref 0.5–1.3)
CREAT SERPL-MCNC: 1.08 MG/DL — SIGNIFICANT CHANGE UP (ref 0.5–1.3)
CREAT SERPL-MCNC: 1.08 MG/DL — SIGNIFICANT CHANGE UP (ref 0.5–1.3)
EGFR: 49 ML/MIN/1.73M2 — LOW
EGFR: 49 ML/MIN/1.73M2 — LOW
EGFR: 61 ML/MIN/1.73M2 — SIGNIFICANT CHANGE UP
EGFR: 61 ML/MIN/1.73M2 — SIGNIFICANT CHANGE UP
EOSINOPHIL # BLD AUTO: 0.07 K/UL — SIGNIFICANT CHANGE UP (ref 0–0.5)
EOSINOPHIL # BLD AUTO: 0.07 K/UL — SIGNIFICANT CHANGE UP (ref 0–0.5)
EOSINOPHIL NFR BLD AUTO: 0.8 % — SIGNIFICANT CHANGE UP (ref 0–6)
EOSINOPHIL NFR BLD AUTO: 0.8 % — SIGNIFICANT CHANGE UP (ref 0–6)
GLUCOSE SERPL-MCNC: 126 MG/DL — HIGH (ref 70–99)
GLUCOSE SERPL-MCNC: 126 MG/DL — HIGH (ref 70–99)
GLUCOSE SERPL-MCNC: 80 MG/DL — SIGNIFICANT CHANGE UP (ref 70–99)
GLUCOSE SERPL-MCNC: 80 MG/DL — SIGNIFICANT CHANGE UP (ref 70–99)
HCT VFR BLD CALC: 36.6 % — SIGNIFICANT CHANGE UP (ref 34.5–45)
HCT VFR BLD CALC: 36.6 % — SIGNIFICANT CHANGE UP (ref 34.5–45)
HCT VFR BLD CALC: 38.2 % — SIGNIFICANT CHANGE UP (ref 34.5–45)
HCT VFR BLD CALC: 38.2 % — SIGNIFICANT CHANGE UP (ref 34.5–45)
HGB BLD-MCNC: 12.1 G/DL — SIGNIFICANT CHANGE UP (ref 11.5–15.5)
HGB BLD-MCNC: 12.1 G/DL — SIGNIFICANT CHANGE UP (ref 11.5–15.5)
HGB BLD-MCNC: 12.3 G/DL — SIGNIFICANT CHANGE UP (ref 11.5–15.5)
HGB BLD-MCNC: 12.3 G/DL — SIGNIFICANT CHANGE UP (ref 11.5–15.5)
IMM GRANULOCYTES NFR BLD AUTO: 0.5 % — SIGNIFICANT CHANGE UP (ref 0–0.9)
IMM GRANULOCYTES NFR BLD AUTO: 0.5 % — SIGNIFICANT CHANGE UP (ref 0–0.9)
LYMPHOCYTES # BLD AUTO: 2.01 K/UL — SIGNIFICANT CHANGE UP (ref 1–3.3)
LYMPHOCYTES # BLD AUTO: 2.01 K/UL — SIGNIFICANT CHANGE UP (ref 1–3.3)
LYMPHOCYTES # BLD AUTO: 24.2 % — SIGNIFICANT CHANGE UP (ref 13–44)
LYMPHOCYTES # BLD AUTO: 24.2 % — SIGNIFICANT CHANGE UP (ref 13–44)
MAGNESIUM SERPL-MCNC: 1.7 MG/DL — SIGNIFICANT CHANGE UP (ref 1.6–2.6)
MAGNESIUM SERPL-MCNC: 1.7 MG/DL — SIGNIFICANT CHANGE UP (ref 1.6–2.6)
MAGNESIUM SERPL-MCNC: 1.8 MG/DL — SIGNIFICANT CHANGE UP (ref 1.6–2.6)
MAGNESIUM SERPL-MCNC: 1.8 MG/DL — SIGNIFICANT CHANGE UP (ref 1.6–2.6)
MCHC RBC-ENTMCNC: 30.5 PG — SIGNIFICANT CHANGE UP (ref 27–34)
MCHC RBC-ENTMCNC: 30.5 PG — SIGNIFICANT CHANGE UP (ref 27–34)
MCHC RBC-ENTMCNC: 30.8 PG — SIGNIFICANT CHANGE UP (ref 27–34)
MCHC RBC-ENTMCNC: 30.8 PG — SIGNIFICANT CHANGE UP (ref 27–34)
MCHC RBC-ENTMCNC: 31.7 GM/DL — LOW (ref 32–36)
MCHC RBC-ENTMCNC: 31.7 GM/DL — LOW (ref 32–36)
MCHC RBC-ENTMCNC: 33.6 GM/DL — SIGNIFICANT CHANGE UP (ref 32–36)
MCHC RBC-ENTMCNC: 33.6 GM/DL — SIGNIFICANT CHANGE UP (ref 32–36)
MCV RBC AUTO: 91.7 FL — SIGNIFICANT CHANGE UP (ref 80–100)
MCV RBC AUTO: 91.7 FL — SIGNIFICANT CHANGE UP (ref 80–100)
MCV RBC AUTO: 96.2 FL — SIGNIFICANT CHANGE UP (ref 80–100)
MCV RBC AUTO: 96.2 FL — SIGNIFICANT CHANGE UP (ref 80–100)
MONOCYTES # BLD AUTO: 1.35 K/UL — HIGH (ref 0–0.9)
MONOCYTES # BLD AUTO: 1.35 K/UL — HIGH (ref 0–0.9)
MONOCYTES NFR BLD AUTO: 16.2 % — HIGH (ref 2–14)
MONOCYTES NFR BLD AUTO: 16.2 % — HIGH (ref 2–14)
NEUTROPHILS # BLD AUTO: 4.79 K/UL — SIGNIFICANT CHANGE UP (ref 1.8–7.4)
NEUTROPHILS # BLD AUTO: 4.79 K/UL — SIGNIFICANT CHANGE UP (ref 1.8–7.4)
NEUTROPHILS NFR BLD AUTO: 57.7 % — SIGNIFICANT CHANGE UP (ref 43–77)
NEUTROPHILS NFR BLD AUTO: 57.7 % — SIGNIFICANT CHANGE UP (ref 43–77)
NRBC # BLD: 0 /100 WBCS — SIGNIFICANT CHANGE UP (ref 0–0)
PHOSPHATE SERPL-MCNC: 3 MG/DL — SIGNIFICANT CHANGE UP (ref 2.5–4.5)
PHOSPHATE SERPL-MCNC: 3 MG/DL — SIGNIFICANT CHANGE UP (ref 2.5–4.5)
PHOSPHATE SERPL-MCNC: 3.3 MG/DL — SIGNIFICANT CHANGE UP (ref 2.5–4.5)
PHOSPHATE SERPL-MCNC: 3.3 MG/DL — SIGNIFICANT CHANGE UP (ref 2.5–4.5)
PLATELET # BLD AUTO: 181 K/UL — SIGNIFICANT CHANGE UP (ref 150–400)
PLATELET # BLD AUTO: 181 K/UL — SIGNIFICANT CHANGE UP (ref 150–400)
PLATELET # BLD AUTO: 193 K/UL — SIGNIFICANT CHANGE UP (ref 150–400)
PLATELET # BLD AUTO: 193 K/UL — SIGNIFICANT CHANGE UP (ref 150–400)
POTASSIUM SERPL-MCNC: 4 MMOL/L — SIGNIFICANT CHANGE UP (ref 3.5–5.3)
POTASSIUM SERPL-MCNC: 4 MMOL/L — SIGNIFICANT CHANGE UP (ref 3.5–5.3)
POTASSIUM SERPL-MCNC: 4.2 MMOL/L — SIGNIFICANT CHANGE UP (ref 3.5–5.3)
POTASSIUM SERPL-MCNC: 4.2 MMOL/L — SIGNIFICANT CHANGE UP (ref 3.5–5.3)
POTASSIUM SERPL-SCNC: 4 MMOL/L — SIGNIFICANT CHANGE UP (ref 3.5–5.3)
POTASSIUM SERPL-SCNC: 4 MMOL/L — SIGNIFICANT CHANGE UP (ref 3.5–5.3)
POTASSIUM SERPL-SCNC: 4.2 MMOL/L — SIGNIFICANT CHANGE UP (ref 3.5–5.3)
POTASSIUM SERPL-SCNC: 4.2 MMOL/L — SIGNIFICANT CHANGE UP (ref 3.5–5.3)
PROCALCITONIN SERPL-MCNC: 0.06 NG/ML — SIGNIFICANT CHANGE UP (ref 0.02–0.1)
PROCALCITONIN SERPL-MCNC: 0.06 NG/ML — SIGNIFICANT CHANGE UP (ref 0.02–0.1)
PROT SERPL-MCNC: 6.1 G/DL — SIGNIFICANT CHANGE UP (ref 6–8.3)
PROT SERPL-MCNC: 6.1 G/DL — SIGNIFICANT CHANGE UP (ref 6–8.3)
PROT SERPL-MCNC: 6.2 G/DL — SIGNIFICANT CHANGE UP (ref 6–8.3)
PROT SERPL-MCNC: 6.2 G/DL — SIGNIFICANT CHANGE UP (ref 6–8.3)
RBC # BLD: 3.97 M/UL — SIGNIFICANT CHANGE UP (ref 3.8–5.2)
RBC # BLD: 3.97 M/UL — SIGNIFICANT CHANGE UP (ref 3.8–5.2)
RBC # BLD: 3.99 M/UL — SIGNIFICANT CHANGE UP (ref 3.8–5.2)
RBC # BLD: 3.99 M/UL — SIGNIFICANT CHANGE UP (ref 3.8–5.2)
RBC # FLD: 13.4 % — SIGNIFICANT CHANGE UP (ref 10.3–14.5)
RBC # FLD: 13.4 % — SIGNIFICANT CHANGE UP (ref 10.3–14.5)
RBC # FLD: 13.5 % — SIGNIFICANT CHANGE UP (ref 10.3–14.5)
RBC # FLD: 13.5 % — SIGNIFICANT CHANGE UP (ref 10.3–14.5)
SODIUM SERPL-SCNC: 137 MMOL/L — SIGNIFICANT CHANGE UP (ref 135–145)
SODIUM SERPL-SCNC: 137 MMOL/L — SIGNIFICANT CHANGE UP (ref 135–145)
SODIUM SERPL-SCNC: 139 MMOL/L — SIGNIFICANT CHANGE UP (ref 135–145)
SODIUM SERPL-SCNC: 139 MMOL/L — SIGNIFICANT CHANGE UP (ref 135–145)
WBC # BLD: 6.13 K/UL — SIGNIFICANT CHANGE UP (ref 3.8–10.5)
WBC # BLD: 6.13 K/UL — SIGNIFICANT CHANGE UP (ref 3.8–10.5)
WBC # BLD: 8.31 K/UL — SIGNIFICANT CHANGE UP (ref 3.8–10.5)
WBC # BLD: 8.31 K/UL — SIGNIFICANT CHANGE UP (ref 3.8–10.5)
WBC # FLD AUTO: 6.13 K/UL — SIGNIFICANT CHANGE UP (ref 3.8–10.5)
WBC # FLD AUTO: 6.13 K/UL — SIGNIFICANT CHANGE UP (ref 3.8–10.5)
WBC # FLD AUTO: 8.31 K/UL — SIGNIFICANT CHANGE UP (ref 3.8–10.5)
WBC # FLD AUTO: 8.31 K/UL — SIGNIFICANT CHANGE UP (ref 3.8–10.5)

## 2023-10-21 PROCEDURE — 71045 X-RAY EXAM CHEST 1 VIEW: CPT | Mod: 26

## 2023-10-21 PROCEDURE — 99232 SBSQ HOSP IP/OBS MODERATE 35: CPT

## 2023-10-21 RX ORDER — DEXAMETHASONE 0.5 MG/5ML
6 ELIXIR ORAL DAILY
Refills: 0 | Status: DISCONTINUED | OUTPATIENT
Start: 2023-10-21 | End: 2023-10-23

## 2023-10-21 RX ORDER — INFLUENZA VIRUS VACCINE 15; 15; 15; 15 UG/.5ML; UG/.5ML; UG/.5ML; UG/.5ML
0.7 SUSPENSION INTRAMUSCULAR ONCE
Refills: 0 | Status: DISCONTINUED | OUTPATIENT
Start: 2023-10-21 | End: 2023-10-23

## 2023-10-21 RX ADMIN — Medication 75 MILLIGRAM(S): at 09:35

## 2023-10-21 RX ADMIN — DABIGATRAN ETEXILATE MESYLATE 150 MILLIGRAM(S): 150 CAPSULE ORAL at 22:55

## 2023-10-21 RX ADMIN — Medication 6 MILLIGRAM(S): at 19:25

## 2023-10-21 RX ADMIN — BUDESONIDE AND FORMOTEROL FUMARATE DIHYDRATE 2 PUFF(S): 160; 4.5 AEROSOL RESPIRATORY (INHALATION) at 10:51

## 2023-10-21 RX ADMIN — ATORVASTATIN CALCIUM 20 MILLIGRAM(S): 80 TABLET, FILM COATED ORAL at 22:55

## 2023-10-21 RX ADMIN — Medication 650 MILLIGRAM(S): at 17:00

## 2023-10-21 RX ADMIN — DABIGATRAN ETEXILATE MESYLATE 150 MILLIGRAM(S): 150 CAPSULE ORAL at 09:35

## 2023-10-21 RX ADMIN — Medication 650 MILLIGRAM(S): at 16:06

## 2023-10-21 RX ADMIN — REMDESIVIR 200 MILLIGRAM(S): 5 INJECTION INTRAVENOUS at 05:36

## 2023-10-21 RX ADMIN — Medication 75 MICROGRAM(S): at 05:37

## 2023-10-21 RX ADMIN — BUDESONIDE AND FORMOTEROL FUMARATE DIHYDRATE 2 PUFF(S): 160; 4.5 AEROSOL RESPIRATORY (INHALATION) at 22:55

## 2023-10-21 NOTE — PROGRESS NOTE ADULT - PROBLEM SELECTOR PROBLEM 2
Okay to order TSH and thyroglobulin level-diagnosis is history of thyroid cancer  Also order DEXA scan-diagnosis is osteopenia  Okay to use frozen time slot for patient to rebook appointment in her timeframe     Sarcoidosis

## 2023-10-21 NOTE — PROGRESS NOTE ADULT - ATTENDING COMMENTS
Patient seen and examined.  Agree with resident note.  Meds, labs and vitals all reviewed.  Patient presenting with fall at home, inability to get up, on presentation found to have rhabdo as well as COVID positive.   Currently being treated with Remdesivir.  Cardiology evaluation for PPM interrogation.   Monitor CK in setting of rhabdo.

## 2023-10-21 NOTE — PHYSICAL THERAPY INITIAL EVALUATION ADULT - GENERAL OBSERVATIONS, REHAB EVAL
PT IE Completed, pt cleared for PT by VIVIANA Arteaga. MD Prater cleared for PT as well, pt still pending lumbar xray results but pt with other negative xrays and resolution of pain. Pt received in supine, room air, NAD.

## 2023-10-21 NOTE — PHYSICAL THERAPY INITIAL EVALUATION ADULT - ADDITIONAL COMMENTS
Prior to admission pt reports living alone in a house with 13 steps to enter. Pt reports that other ppl live in level below her (non-family) that can provide some support if needed. Pt amb with cane in the community, indep with ADLs prior.

## 2023-10-21 NOTE — CONSULT NOTE ADULT - SUBJECTIVE AND OBJECTIVE BOX
Patient is a 88y old  Female who presents with a chief complaint of mechanical fall (21 Oct 2023 12:01)      HPI:  88F PMHx Sarcoidosis, HTN, Afib (c/b TIA no residual deficits, on pradaxa), hypothyroidism, , CAD, Sick sinus syndrome s/p PPM placement,  p/w fall iso getting out of bed (elevated off of ground), she stepped onto a stool however she slipped and fell onto a hardwood floor. She hit her head on her L side however did not syncopize. Pt was unable to get up for 11 hours, she had too much pain on her L side and felt too weak. She climbed back into her bed and her family found her there. Pt was unable to get up with assistance so EMS called.     Pt denies prodromal sx, no CP, SOB, dizziness, lightheadedness before fall.   Pt denies syncope, urinary/fecal incontinence.  Pt reports pain and weakness after fall, however she states she was mainly limited by pain.     Patient has fallen 2x before, never hospitalized for it. Most recently a few weeks ago, with similar characteristics as this fall ?mechanical fall.   ED Vitals: Tmax 102.7 rectal --> 98.7F oral HR 69, /73. SPO2 95 RA  ED Labs: WBC 8.8 diff WNL, Hgb 12.8, Plt 209. CMP notable for eGFR 55 iso BUN/Cr 14/0.99 otherwise WNL. Lactate 1.3. Mild CK elevation to 762. TSH WNL 0.72, T4 WNL 8.7.   ED Studies: UA neg WBC/Bacteria/LE/N. Negative blood and RBC. RVP Covid positive.   CTH: No acute pathology. +Scattered chronic ischemic changes.   CT C-spine: No fracture.   EKG: Sinus bradycardia, no clear a-pacer spikes noted per author  ED Interventions: Ofirmev 1g x1, CTX 1g x1, 2L NS   (20 Oct 2023 22:07)    PAST MEDICAL & SURGICAL HISTORY:  Diverticulosis of large intestine  Diverticulosis      Sarcoidosis  Sarcoidosis      Essential hypertension  HTN (hypertension)      Transient cerebral ischemia  TIA (transient ischemic attack)      Atherosclerosis of coronary artery  CAD (coronary artery disease)      Cataract  Cataract      Status post percutaneous transluminal coronary angioplasty  s/p 2012      Presence of cardiac pacemaker  s/p 2011        MEDICATIONS  (STANDING):  atorvastatin 20 milliGRAM(s) Oral at bedtime  budesonide 160 MICROgram(s)/formoterol 4.5 MICROgram(s) Inhaler 2 Puff(s) Inhalation two times a day  dabigatran 150 milliGRAM(s) Oral every 12 hours  influenza  Vaccine (HIGH DOSE) 0.7 milliLiter(s) IntraMuscular once  levothyroxine 75 MICROGram(s) Oral daily  metoprolol succinate ER 75 milliGRAM(s) Oral every 12 hours  remdesivir  IVPB 100 milliGRAM(s) IV Intermittent once    MEDICATIONS  (PRN):  acetaminophen     Tablet .. 650 milliGRAM(s) Oral every 6 hours PRN Temp greater or equal to 38C (100.4F), Mild Pain (1 - 3)  aluminum hydroxide/magnesium hydroxide/simethicone Suspension 30 milliLiter(s) Oral every 4 hours PRN Dyspepsia  melatonin 3 milliGRAM(s) Oral at bedtime PRN Insomnia  ondansetron Injectable 4 milliGRAM(s) IV Push every 8 hours PRN Nausea and/or Vomiting             Home Living Status :  lives alone in an elevator accessible apartment building          -  Prior Home Care Services :  none             -  Family support : children      Baseline Functional Level Prior to Admission :             - ADL's/ IADL's :  independent          - Ambulatory status prior to admission :   walked with a cane          FAMILY HISTORY:  Family history of diabetes mellitus  Family history of diabetes mellitus (DM)        CBC Full  -  ( 21 Oct 2023 07:41 )  WBC Count : 8.31 K/uL  RBC Count : 3.97 M/uL  Hemoglobin : 12.1 g/dL  Hematocrit : 38.2 %  Platelet Count - Automated : 181 K/uL  Mean Cell Volume : 96.2 fl  Mean Cell Hemoglobin : 30.5 pg  Mean Cell Hemoglobin Concentration : 31.7 gm/dL  Auto Neutrophil # : 4.79 K/uL  Auto Lymphocyte # : 2.01 K/uL  Auto Monocyte # : 1.35 K/uL  Auto Eosinophil # : 0.07 K/uL  Auto Basophil # : 0.05 K/uL  Auto Neutrophil % : 57.7 %  Auto Lymphocyte % : 24.2 %  Auto Monocyte % : 16.2 %  Auto Eosinophil % : 0.8 %  Auto Basophil % : 0.6 %      10-21    139  |  108  |  12  ----------------------------<  80  4.2   |  19<L>  |  0.91    Ca    8.3<L>      21 Oct 2023 07:41  Phos  3.3     10-21  Mg     1.7     10-21    TPro  6.1  /  Alb  3.2<L>  /  TBili  0.4  /  DBili  x   /  AST  31  /  ALT  10  /  AlkPhos  61  10-21      Urinalysis Basic - ( 21 Oct 2023 07:41 )    Color: x / Appearance: x / SG: x / pH: x  Gluc: 80 mg/dL / Ketone: x  / Bili: x / Urobili: x   Blood: x / Protein: x / Nitrite: x   Leuk Esterase: x / RBC: x / WBC x   Sq Epi: x / Non Sq Epi: x / Bacteria: x        Radiology :     < from: Xray Chest 1 View AP/PA (10.20.23 @ 19:54) >    ACC: [id number] EXAM:  XR CHEST AP OR PA 1V   ORDERED BY: MIMI JACK     PROCEDURE DATE:  10/20/2023          INTERPRETATION:  Clinical History: Pain    Frontal examination of the chest demonstrates mild cardiomegaly. No acute   infiltrates. Pacemaker overlies left chest wall. Tips of pacer wires   overlie right atrium and right ventricle respectively. Mild dextro   scoliosis thoracic spine with degenerative changes.    IMPRESSION: No acute infiltrates    < end of copied text >    < from: Xray Shoulder 2 Views, Left w/ Axillary (10.20.23 @ 19:56) >  ACC: [id number] EXAM:  XR SHOULDER W AXILLA MIN 2V LT   ORDERED BY: MIMI JACK     PROCEDURE DATE:  10/20/2023          INTERPRETATION:  Clinical History: Pain    2 views of the left shoulder demonstrates no evidence of acute fracture   or dislocation. Pacemaker overlies left chest wall.    IMPRESSION: No evidence of acute fracture or dislocation    < from: CT Head No Cont (10.20.23 @ 19:27) >  ACC: [id number] EXAM:  CT CERVICAL SPINE   ORDERED BY: MIMI JACK     ACC: [id number] EXAM:  CT BRAIN   ORDERED BY: MIMI JACK     PROCEDURE DATE:  10/20/2023          INTERPRETATION:  INDICATION: Headache with neck pain status post trauma.  TECHNIQUE:  A non contrast 2.5mm axial CT study of the brain was   performed from skull base to vertex. Coronal and sagittal reformations   were generated from the axial data.  COMPARISON EXAMINATION:  CT 8/20/2023    FINDINGS:    HEMISPHERES:  Small vessel ischemic changes are noted in the white matter   of both hemispheres with volume loss. No acute abnormality or hemorrhagic   focus noted. 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 These   findings were noted previously.  VENTRICLES:  Midline with mild ex vacuo enlargement  POSTERIOR FOSSA:  The brain stem and cerebellum are unremarkable.  No CP   angle lesion noted.  EXTRACEREBRAL SPACES:  No subdural or epidural collections are noted.  SKULL BASE AND CALVARIUM:  Appears intact.  No fracture or destructive   lesion is identified.  SINUSES AND MASTOIDS:  Scattered thickening noted in sinuses. Mastoids   are clear.    CT CERVICAL    INDICATIONS:  neck pain.Trauma.  TECHNIQUE:  Thin section CT imaging was conducted.  3-D, Coronal and   sagittal reformations were generated from the axial data.    FINDINGS:    There is alteration of the cervical lordosis which may reflect   positioning or spasm.    C1/C2:  The anterior and posterior arches of C1 appear to be intact.   There is no C1-C2 subluxation. No odontoid fracture is noted. Base of C2   appears to be intact    There are mild chronic compression deformities of C5 and C6. No acute   fracture noted in the cervical or upper thoracic region.    Degenerative changes diffusely noted most prominent at C5-6 and C6-7.    Prevertebral soft tissues are unremarkable.      BRAIN  IMPRESSION:    1)  scattered chronic ischemic changes with volume loss. No acute   abnormality suggested  2)  no intracerebral hemorrhage, contusion, or extracerebral hemorrhagic   collections identified.    CERVICAL IMPRESSION:    Multilevel degenerative changes. No acute fracture identified      < end of copied text >        Review of Systems : per HPI         Vital Signs Last 24 Hrs  T(C): 36.3 (21 Oct 2023 06:41), Max: 39.3 (20 Oct 2023 18:50)   T(F): 97.3 (21 Oct 2023 06:41), Max: 102.7 (20 Oct 2023 18:50)  HR: 65 (21 Oct 2023 09:24) (59 - 69)  BP: 124/72 (21 Oct 2023 09:24) (124/72 - 141/59)  BP(mean): --  RR: 17 (21 Oct 2023 06:41) (16 - 18)  SpO2: 99% (21 Oct 2023 06:41) (95% - 99%)    Parameters below as of 20 Oct 2023 22:42  Patient On (Oxygen Delivery Method): room air            Physical Exam :  on [employer] COVID isolation , 88 y o woman lying comfortably in semi Baker's position , awake , alert , no acute complaints    Head : normocephalic , atraumatic    Eyes : PERRLA , EOMI , no nystagmus , sclera anicteric    ENT / FACE : neg nasal discharge , uvula midline , no oropharyngeal erythema / exudate    Neck : supple , negative JVD , negative carotid bruits , no thyromegaly    Chest : CTA bilaterally , neg wheeze / rhonchi / rales / crackles / egophany    Cardiovascular : regular rate and rhythm , neg murmurs / rubs / gallops    Abdomen : soft , non distended , non tender to palpation in all 4 quadrants ,  normal bowel sounds     Extremities : WWP , neg cyanosis /clubbing / edemaGU :     Neurologic Exam :     Alert and oriented to person , place , date/year , speech fluent w/o dysarthria , follows commands , recent and remote memory intact , repetition intact , comprehension intact ,  attention/concentration intact , fund of knowledge appropriate    Cranial Nerves:           II :                         pupils equal , round and reactive to light , visual fields intact         III/ IV/VI :              extraocular movements intact , neg nystagmus , neg ptosis        V :                        facial sensation intact , V1-3 normal        VII :                      face symmetric , no droop , normal eye closure and smile        VIII :                     hearing intact to finger rub bilaterally        IX and X :             no hoarseness , gag intact , palate/ uvula rise symmetrically        XI :                       SCM / trapezius strength intact bilateral        XII :                      no tongue deviation       Motor Exam:                Right UE:                     no focal weakness ,  > 3+/5 throughout  , no pronator drift    Left UE:                       no focal weakness ,  > 3+/5 throughout  , no pronator drift    Right LE:          no focal weakness ,  > 3+/5 throughout     Left LE:          no focal weakness ,  > 3+/5 throughout        Sensation :         intact to light touch x 4 extremities                            no neglect or extinction on double simultaneous testing.    DTR :           biceps/brachioradialis : equal                            patella/ankle : equal           neg Babinski      Coordination :            Finger to Nose :  neg dysmetria bilaterally       Gait :  not tested          PM&R Impression : admitted for c/o fall at home    - no acute pathology on CT brain imaging     - deconditioned    - no focal neurologic deficits         Recommendations / Plan :       1) Physical / Occupational therapy focusing on therapeutic exercises , equipment evaluation , bed mobility/transfer out of bed evaluation , progressive ambulation with assistive devices prn .    2) Current disposition plan recommendation  :    pending functional progress , probable [facility]

## 2023-10-21 NOTE — PHYSICAL THERAPY INITIAL EVALUATION ADULT - PERTINENT HX OF CURRENT PROBLEM, REHAB EVAL
88F PMHx Sarcoidosis, HTN, Afib (c/b TIA no residual deficits, on pradaxa), hypothyroidism, CAD, Sick sinus syndrome s/p PPM placement, p/w likely mechanical fall

## 2023-10-21 NOTE — PROGRESS NOTE ADULT - SUBJECTIVE AND OBJECTIVE BOX
***Note in progress***    OVERNIGHT EVENTS: NAEO    SUBJECTIVE / INTERVAL HPI: Patient seen and examined at bedside. Patient denying chest pain, SOB, palpitations, cough. Patient denies fever, chills, HA, Dizziness, N/V, abdominal pain, diarrhea, constipation, hematochezia/melena, dysuria, hematuria, new onset weakness/numbness, LE pain and/or swelling.    Remaining ROS negative       PHYSICAL EXAM:    General:NAD.   HEENT: NC/AT; PERRL, anicteric sclera; MMM  Neck: supple  Cardiovascular: +S1/S2, RRR  Respiratory: CTA B/L; no W/R/R  Gastrointestinal: soft, NT/ND; +BSx4  Extremities: WWP; no edema, clubbing or cyanosis  Vascular: 2+ radial, DP/PT pulses B/L  Neurological: AAOx3; no focal deficits  Psychiatric: pleasant mood and affect  Dermatologic: no appreciable wounds or damage to the skin    VITAL SIGNS:  Vital Signs Last 24 Hrs  T(C): 36.3 (21 Oct 2023 06:41), Max: 39.3 (20 Oct 2023 18:50)  T(F): 97.3 (21 Oct 2023 06:41), Max: 102.7 (20 Oct 2023 18:50)  HR: 65 (21 Oct 2023 09:24) (59 - 69)  BP: 124/72 (21 Oct 2023 09:24) (124/72 - 141/59)  BP(mean): --  RR: 17 (21 Oct 2023 06:41) (16 - 18)  SpO2: 99% (21 Oct 2023 06:41) (95% - 99%)    Parameters below as of 20 Oct 2023 22:42  Patient On (Oxygen Delivery Method): room air          MEDICATIONS:  MEDICATIONS  (STANDING):  atorvastatin 20 milliGRAM(s) Oral at bedtime  budesonide 160 MICROgram(s)/formoterol 4.5 MICROgram(s) Inhaler 2 Puff(s) Inhalation two times a day  dabigatran 150 milliGRAM(s) Oral every 12 hours  influenza  Vaccine (HIGH DOSE) 0.7 milliLiter(s) IntraMuscular once  levothyroxine 75 MICROGram(s) Oral daily  metoprolol succinate ER 75 milliGRAM(s) Oral every 12 hours  remdesivir  IVPB 100 milliGRAM(s) IV Intermittent once    MEDICATIONS  (PRN):  acetaminophen     Tablet .. 650 milliGRAM(s) Oral every 6 hours PRN Temp greater or equal to 38C (100.4F), Mild Pain (1 - 3)  aluminum hydroxide/magnesium hydroxide/simethicone Suspension 30 milliLiter(s) Oral every 4 hours PRN Dyspepsia  melatonin 3 milliGRAM(s) Oral at bedtime PRN Insomnia  ondansetron Injectable 4 milliGRAM(s) IV Push every 8 hours PRN Nausea and/or Vomiting      ALLERGIES:  Allergies    latex (Other)  codeine (Unknown)    Intolerances        LABS:                        12.1   8.31  )-----------( 181      ( 21 Oct 2023 07:41 )             38.2     10-21    139  |  108  |  12  ----------------------------<  80  4.2   |  19<L>  |  0.91    Ca    8.3<L>      21 Oct 2023 07:41  Phos  3.3     10-21  Mg     1.7     10-21    TPro  6.1  /  Alb  3.2<L>  /  TBili  0.4  /  DBili  x   /  AST  31  /  ALT  10  /  AlkPhos  61  10-21    PT/INR - ( 20 Oct 2023 19:14 )   PT: 14.0 sec;   INR: 1.24          PTT - ( 20 Oct 2023 19:14 )  PTT:63.4 sec  Urinalysis Basic - ( 21 Oct 2023 07:41 )    Color: x / Appearance: x / SG: x / pH: x  Gluc: 80 mg/dL / Ketone: x  / Bili: x / Urobili: x   Blood: x / Protein: x / Nitrite: x   Leuk Esterase: x / RBC: x / WBC x   Sq Epi: x / Non Sq Epi: x / Bacteria: x      CAPILLARY BLOOD GLUCOSE          RADIOLOGY & ADDITIONAL TESTS: Reviewed.

## 2023-10-22 LAB
ALBUMIN SERPL ELPH-MCNC: 3.3 G/DL — SIGNIFICANT CHANGE UP (ref 3.3–5)
ALBUMIN SERPL ELPH-MCNC: 3.3 G/DL — SIGNIFICANT CHANGE UP (ref 3.3–5)
ALP SERPL-CCNC: 59 U/L — SIGNIFICANT CHANGE UP (ref 40–120)
ALP SERPL-CCNC: 59 U/L — SIGNIFICANT CHANGE UP (ref 40–120)
ALT FLD-CCNC: 12 U/L — SIGNIFICANT CHANGE UP (ref 10–45)
ALT FLD-CCNC: 12 U/L — SIGNIFICANT CHANGE UP (ref 10–45)
ANION GAP SERPL CALC-SCNC: 12 MMOL/L — SIGNIFICANT CHANGE UP (ref 5–17)
ANION GAP SERPL CALC-SCNC: 12 MMOL/L — SIGNIFICANT CHANGE UP (ref 5–17)
APPEARANCE UR: CLEAR — SIGNIFICANT CHANGE UP
APPEARANCE UR: CLEAR — SIGNIFICANT CHANGE UP
AST SERPL-CCNC: 33 U/L — SIGNIFICANT CHANGE UP (ref 10–40)
AST SERPL-CCNC: 33 U/L — SIGNIFICANT CHANGE UP (ref 10–40)
BASOPHILS # BLD AUTO: 0.02 K/UL — SIGNIFICANT CHANGE UP (ref 0–0.2)
BASOPHILS # BLD AUTO: 0.02 K/UL — SIGNIFICANT CHANGE UP (ref 0–0.2)
BASOPHILS NFR BLD AUTO: 0.4 % — SIGNIFICANT CHANGE UP (ref 0–2)
BASOPHILS NFR BLD AUTO: 0.4 % — SIGNIFICANT CHANGE UP (ref 0–2)
BILIRUB SERPL-MCNC: 0.2 MG/DL — SIGNIFICANT CHANGE UP (ref 0.2–1.2)
BILIRUB SERPL-MCNC: 0.2 MG/DL — SIGNIFICANT CHANGE UP (ref 0.2–1.2)
BILIRUB UR-MCNC: NEGATIVE — SIGNIFICANT CHANGE UP
BILIRUB UR-MCNC: NEGATIVE — SIGNIFICANT CHANGE UP
BUN SERPL-MCNC: 14 MG/DL — SIGNIFICANT CHANGE UP (ref 7–23)
BUN SERPL-MCNC: 14 MG/DL — SIGNIFICANT CHANGE UP (ref 7–23)
CALCIUM SERPL-MCNC: 8.8 MG/DL — SIGNIFICANT CHANGE UP (ref 8.4–10.5)
CALCIUM SERPL-MCNC: 8.8 MG/DL — SIGNIFICANT CHANGE UP (ref 8.4–10.5)
CHLORIDE SERPL-SCNC: 109 MMOL/L — HIGH (ref 96–108)
CHLORIDE SERPL-SCNC: 109 MMOL/L — HIGH (ref 96–108)
CK SERPL-CCNC: 634 U/L — HIGH (ref 25–170)
CK SERPL-CCNC: 634 U/L — HIGH (ref 25–170)
CO2 SERPL-SCNC: 19 MMOL/L — LOW (ref 22–31)
CO2 SERPL-SCNC: 19 MMOL/L — LOW (ref 22–31)
COLOR SPEC: YELLOW — SIGNIFICANT CHANGE UP
COLOR SPEC: YELLOW — SIGNIFICANT CHANGE UP
CREAT SERPL-MCNC: 0.89 MG/DL — SIGNIFICANT CHANGE UP (ref 0.5–1.3)
CREAT SERPL-MCNC: 0.89 MG/DL — SIGNIFICANT CHANGE UP (ref 0.5–1.3)
DIFF PNL FLD: NEGATIVE — SIGNIFICANT CHANGE UP
DIFF PNL FLD: NEGATIVE — SIGNIFICANT CHANGE UP
EGFR: 62 ML/MIN/1.73M2 — SIGNIFICANT CHANGE UP
EGFR: 62 ML/MIN/1.73M2 — SIGNIFICANT CHANGE UP
EOSINOPHIL # BLD AUTO: 0 K/UL — SIGNIFICANT CHANGE UP (ref 0–0.5)
EOSINOPHIL # BLD AUTO: 0 K/UL — SIGNIFICANT CHANGE UP (ref 0–0.5)
EOSINOPHIL NFR BLD AUTO: 0 % — SIGNIFICANT CHANGE UP (ref 0–6)
EOSINOPHIL NFR BLD AUTO: 0 % — SIGNIFICANT CHANGE UP (ref 0–6)
GLUCOSE SERPL-MCNC: 145 MG/DL — HIGH (ref 70–99)
GLUCOSE SERPL-MCNC: 145 MG/DL — HIGH (ref 70–99)
GLUCOSE UR QL: 100 MG/DL
GLUCOSE UR QL: 100 MG/DL
HCT VFR BLD CALC: 39.5 % — SIGNIFICANT CHANGE UP (ref 34.5–45)
HCT VFR BLD CALC: 39.5 % — SIGNIFICANT CHANGE UP (ref 34.5–45)
HGB BLD-MCNC: 13.1 G/DL — SIGNIFICANT CHANGE UP (ref 11.5–15.5)
HGB BLD-MCNC: 13.1 G/DL — SIGNIFICANT CHANGE UP (ref 11.5–15.5)
IMM GRANULOCYTES NFR BLD AUTO: 0.4 % — SIGNIFICANT CHANGE UP (ref 0–0.9)
IMM GRANULOCYTES NFR BLD AUTO: 0.4 % — SIGNIFICANT CHANGE UP (ref 0–0.9)
KETONES UR-MCNC: NEGATIVE MG/DL — SIGNIFICANT CHANGE UP
KETONES UR-MCNC: NEGATIVE MG/DL — SIGNIFICANT CHANGE UP
LEUKOCYTE ESTERASE UR-ACNC: NEGATIVE — SIGNIFICANT CHANGE UP
LEUKOCYTE ESTERASE UR-ACNC: NEGATIVE — SIGNIFICANT CHANGE UP
LYMPHOCYTES # BLD AUTO: 1.35 K/UL — SIGNIFICANT CHANGE UP (ref 1–3.3)
LYMPHOCYTES # BLD AUTO: 1.35 K/UL — SIGNIFICANT CHANGE UP (ref 1–3.3)
LYMPHOCYTES # BLD AUTO: 25.4 % — SIGNIFICANT CHANGE UP (ref 13–44)
LYMPHOCYTES # BLD AUTO: 25.4 % — SIGNIFICANT CHANGE UP (ref 13–44)
MAGNESIUM SERPL-MCNC: 1.9 MG/DL — SIGNIFICANT CHANGE UP (ref 1.6–2.6)
MAGNESIUM SERPL-MCNC: 1.9 MG/DL — SIGNIFICANT CHANGE UP (ref 1.6–2.6)
MCHC RBC-ENTMCNC: 31 PG — SIGNIFICANT CHANGE UP (ref 27–34)
MCHC RBC-ENTMCNC: 31 PG — SIGNIFICANT CHANGE UP (ref 27–34)
MCHC RBC-ENTMCNC: 33.2 GM/DL — SIGNIFICANT CHANGE UP (ref 32–36)
MCHC RBC-ENTMCNC: 33.2 GM/DL — SIGNIFICANT CHANGE UP (ref 32–36)
MCV RBC AUTO: 93.4 FL — SIGNIFICANT CHANGE UP (ref 80–100)
MCV RBC AUTO: 93.4 FL — SIGNIFICANT CHANGE UP (ref 80–100)
MONOCYTES # BLD AUTO: 0.17 K/UL — SIGNIFICANT CHANGE UP (ref 0–0.9)
MONOCYTES # BLD AUTO: 0.17 K/UL — SIGNIFICANT CHANGE UP (ref 0–0.9)
MONOCYTES NFR BLD AUTO: 3.2 % — SIGNIFICANT CHANGE UP (ref 2–14)
MONOCYTES NFR BLD AUTO: 3.2 % — SIGNIFICANT CHANGE UP (ref 2–14)
NEUTROPHILS # BLD AUTO: 3.76 K/UL — SIGNIFICANT CHANGE UP (ref 1.8–7.4)
NEUTROPHILS # BLD AUTO: 3.76 K/UL — SIGNIFICANT CHANGE UP (ref 1.8–7.4)
NEUTROPHILS NFR BLD AUTO: 70.6 % — SIGNIFICANT CHANGE UP (ref 43–77)
NEUTROPHILS NFR BLD AUTO: 70.6 % — SIGNIFICANT CHANGE UP (ref 43–77)
NITRITE UR-MCNC: NEGATIVE — SIGNIFICANT CHANGE UP
NITRITE UR-MCNC: NEGATIVE — SIGNIFICANT CHANGE UP
NRBC # BLD: 0 /100 WBCS — SIGNIFICANT CHANGE UP (ref 0–0)
NRBC # BLD: 0 /100 WBCS — SIGNIFICANT CHANGE UP (ref 0–0)
PH UR: 5 — SIGNIFICANT CHANGE UP (ref 5–8)
PH UR: 5 — SIGNIFICANT CHANGE UP (ref 5–8)
PHOSPHATE SERPL-MCNC: 3.7 MG/DL — SIGNIFICANT CHANGE UP (ref 2.5–4.5)
PHOSPHATE SERPL-MCNC: 3.7 MG/DL — SIGNIFICANT CHANGE UP (ref 2.5–4.5)
PLATELET # BLD AUTO: 217 K/UL — SIGNIFICANT CHANGE UP (ref 150–400)
PLATELET # BLD AUTO: 217 K/UL — SIGNIFICANT CHANGE UP (ref 150–400)
POTASSIUM SERPL-MCNC: 4.7 MMOL/L — SIGNIFICANT CHANGE UP (ref 3.5–5.3)
POTASSIUM SERPL-MCNC: 4.7 MMOL/L — SIGNIFICANT CHANGE UP (ref 3.5–5.3)
POTASSIUM SERPL-SCNC: 4.7 MMOL/L — SIGNIFICANT CHANGE UP (ref 3.5–5.3)
POTASSIUM SERPL-SCNC: 4.7 MMOL/L — SIGNIFICANT CHANGE UP (ref 3.5–5.3)
PROCALCITONIN SERPL-MCNC: 0.04 NG/ML — SIGNIFICANT CHANGE UP (ref 0.02–0.1)
PROCALCITONIN SERPL-MCNC: 0.04 NG/ML — SIGNIFICANT CHANGE UP (ref 0.02–0.1)
PROT SERPL-MCNC: 6.3 G/DL — SIGNIFICANT CHANGE UP (ref 6–8.3)
PROT SERPL-MCNC: 6.3 G/DL — SIGNIFICANT CHANGE UP (ref 6–8.3)
PROT UR-MCNC: NEGATIVE MG/DL — SIGNIFICANT CHANGE UP
PROT UR-MCNC: NEGATIVE MG/DL — SIGNIFICANT CHANGE UP
RBC # BLD: 4.23 M/UL — SIGNIFICANT CHANGE UP (ref 3.8–5.2)
RBC # BLD: 4.23 M/UL — SIGNIFICANT CHANGE UP (ref 3.8–5.2)
RBC # FLD: 13.5 % — SIGNIFICANT CHANGE UP (ref 10.3–14.5)
RBC # FLD: 13.5 % — SIGNIFICANT CHANGE UP (ref 10.3–14.5)
SODIUM SERPL-SCNC: 140 MMOL/L — SIGNIFICANT CHANGE UP (ref 135–145)
SODIUM SERPL-SCNC: 140 MMOL/L — SIGNIFICANT CHANGE UP (ref 135–145)
SP GR SPEC: 1.01 — SIGNIFICANT CHANGE UP (ref 1–1.03)
SP GR SPEC: 1.01 — SIGNIFICANT CHANGE UP (ref 1–1.03)
TSH SERPL-MCNC: 0.51 UIU/ML — SIGNIFICANT CHANGE UP (ref 0.27–4.2)
TSH SERPL-MCNC: 0.51 UIU/ML — SIGNIFICANT CHANGE UP (ref 0.27–4.2)
UROBILINOGEN FLD QL: 0.2 MG/DL — SIGNIFICANT CHANGE UP (ref 0.2–1)
UROBILINOGEN FLD QL: 0.2 MG/DL — SIGNIFICANT CHANGE UP (ref 0.2–1)
WBC # BLD: 5.32 K/UL — SIGNIFICANT CHANGE UP (ref 3.8–10.5)
WBC # BLD: 5.32 K/UL — SIGNIFICANT CHANGE UP (ref 3.8–10.5)
WBC # FLD AUTO: 5.32 K/UL — SIGNIFICANT CHANGE UP (ref 3.8–10.5)
WBC # FLD AUTO: 5.32 K/UL — SIGNIFICANT CHANGE UP (ref 3.8–10.5)

## 2023-10-22 PROCEDURE — 99232 SBSQ HOSP IP/OBS MODERATE 35: CPT

## 2023-10-22 RX ORDER — PANTOPRAZOLE SODIUM 20 MG/1
40 TABLET, DELAYED RELEASE ORAL
Refills: 0 | Status: DISCONTINUED | OUTPATIENT
Start: 2023-10-23 | End: 2023-10-23

## 2023-10-22 RX ADMIN — ATORVASTATIN CALCIUM 20 MILLIGRAM(S): 80 TABLET, FILM COATED ORAL at 22:19

## 2023-10-22 RX ADMIN — REMDESIVIR 200 MILLIGRAM(S): 5 INJECTION INTRAVENOUS at 07:01

## 2023-10-22 RX ADMIN — Medication 75 MICROGRAM(S): at 07:01

## 2023-10-22 RX ADMIN — DABIGATRAN ETEXILATE MESYLATE 150 MILLIGRAM(S): 150 CAPSULE ORAL at 09:05

## 2023-10-22 RX ADMIN — Medication 6 MILLIGRAM(S): at 18:58

## 2023-10-22 RX ADMIN — Medication 75 MILLIGRAM(S): at 09:05

## 2023-10-22 RX ADMIN — Medication 75 MILLIGRAM(S): at 22:19

## 2023-10-22 RX ADMIN — BUDESONIDE AND FORMOTEROL FUMARATE DIHYDRATE 2 PUFF(S): 160; 4.5 AEROSOL RESPIRATORY (INHALATION) at 09:07

## 2023-10-22 RX ADMIN — DABIGATRAN ETEXILATE MESYLATE 150 MILLIGRAM(S): 150 CAPSULE ORAL at 22:19

## 2023-10-22 NOTE — PROGRESS NOTE ADULT - SUBJECTIVE AND OBJECTIVE BOX
Patient is a 88y old  Female who presents with a chief complaint of mechanical fall (21 Oct 2023 12:01)      SUBJECTIVE / OVERNIGHT EVENTS:  No acute complaints.  Had fever last night.  Afebrile this morning.    MEDICATIONS  (STANDING):  atorvastatin 20 milliGRAM(s) Oral at bedtime  budesonide 160 MICROgram(s)/formoterol 4.5 MICROgram(s) Inhaler 2 Puff(s) Inhalation two times a day  dabigatran 150 milliGRAM(s) Oral every 12 hours  dexAMETHasone     Tablet 6 milliGRAM(s) Oral daily  influenza  Vaccine (HIGH DOSE) 0.7 milliLiter(s) IntraMuscular once  levothyroxine 75 MICROGram(s) Oral daily  metoprolol succinate ER 75 milliGRAM(s) Oral every 12 hours  remdesivir  IVPB 100 milliGRAM(s) IV Intermittent once    MEDICATIONS  (PRN):  acetaminophen     Tablet .. 650 milliGRAM(s) Oral every 6 hours PRN Temp greater or equal to 38C (100.4F), Mild Pain (1 - 3)  aluminum hydroxide/magnesium hydroxide/simethicone Suspension 30 milliLiter(s) Oral every 4 hours PRN Dyspepsia  melatonin 3 milliGRAM(s) Oral at bedtime PRN Insomnia  ondansetron Injectable 4 milliGRAM(s) IV Push every 8 hours PRN Nausea and/or Vomiting      CAPILLARY BLOOD GLUCOSE        I&O's Summary      PHYSICAL EXAM:  Vital Signs Last 24 Hrs  T(C): 36.4 (22 Oct 2023 09:00), Max: 38.7 (21 Oct 2023 15:25)  T(F): 97.5 (22 Oct 2023 09:00), Max: 101.7 (21 Oct 2023 15:25)  HR: 62 (22 Oct 2023 09:00) (54 - 62)  BP: 157/75 (22 Oct 2023 09:00) (111/69 - 172/90)  BP(mean): --  RR: 18 (22 Oct 2023 09:00) (18 - 19)  SpO2: 98% (22 Oct 2023 09:00) (94% - 98%)    Parameters below as of 22 Oct 2023 09:00  Patient On (Oxygen Delivery Method): room air      General:NAD.   HEENT: NC/AT; PERRL, anicteric sclera; MMM  Neck: supple  Cardiovascular: +S1/S2, RRR  Respiratory: CTA B/L; no W/R/R  Gastrointestinal: soft, NT/ND; +BSx4  Extremities: WWP; no edema, clubbing or cyanosis  Vascular: 2+ radial, DP/PT pulses B/L  Neurological: AAOx3; no focal deficits  Psychiatric: pleasant mood and affect  Dermatologic: no appreciable wounds or damage to the skin    LABS:                        13.1   5.32  )-----------( 217      ( 22 Oct 2023 05:30 )             39.5     10-22    140  |  109<H>  |  14  ----------------------------<  145<H>  4.7   |  19<L>  |  0.89    Ca    8.8      22 Oct 2023 05:30  Phos  3.7     10-22  Mg     1.9     10-22    TPro  6.3  /  Alb  3.3  /  TBili  0.2  /  DBili  x   /  AST  33  /  ALT  12  /  AlkPhos  59  10-22    PT/INR - ( 20 Oct 2023 19:14 )   PT: 14.0 sec;   INR: 1.24          PTT - ( 20 Oct 2023 19:14 )  PTT:63.4 sec  CARDIAC MARKERS ( 21 Oct 2023 07:41 )  x     / x     / 928 U/L / x     / x      CARDIAC MARKERS ( 20 Oct 2023 19:14 )  x     / x     / 762 U/L / x     / x          Urinalysis Basic - ( 22 Oct 2023 05:30 )    Color: x / Appearance: x / SG: x / pH: x  Gluc: 145 mg/dL / Ketone: x  / Bili: x / Urobili: x   Blood: x / Protein: x / Nitrite: x   Leuk Esterase: x / RBC: x / WBC x   Sq Epi: x / Non Sq Epi: x / Bacteria: x        RADIOLOGY & ADDITIONAL TESTS:    Imaging Personally Reviewed:    Consultant(s) Notes Reviewed:      Care Discussed with Consultants/Other Providers:

## 2023-10-22 NOTE — PROGRESS NOTE ADULT - PROBLEM SELECTOR PLAN 5
s/p PPM     - C/w home Pradaxa 150 Q12  - Get collateral from cardiology regarding PPM, consider risk v.s. benefit discussion is full dose AC and recurrent falls.

## 2023-10-22 NOTE — PROGRESS NOTE ADULT - PROBLEM SELECTOR PLAN 4
Covid+ on admission, no cough, SOB. Saturating well on RA. No consolidations noted on CXR on author, pulmonary physical exam WNL.     - Remdesivir 3d course iso comorbidities and Decadron.  - Fever last night.   - Monitor off abx  - Isolation as per protocol.

## 2023-10-22 NOTE — PROGRESS NOTE ADULT - PROBLEM SELECTOR PLAN 3
Was down for ~11 hours before family found her iso fall, pain and weakness. CK mild elevation to 762, UA negative blood and RBC S/p 2L NS in ED  - AM CK pending.

## 2023-10-22 NOTE — PROGRESS NOTE ADULT - PROBLEM SELECTOR PLAN 2
Slipped and fell when moving from high bed to stool. No prodromal sz, syncope, motor sz sx. Down for 11 hours.   Mechanical v.s. Vasovagal v.s. Orthostatic v.s. Cardiac etiology v.s. Infectious iso Covid  +Head trauma, CTH, CT C-spine no acute path.     - No fractures noted.   - EP interrogation of PPM pending.   - No longer orthostatic.  - PT.

## 2023-10-22 NOTE — PROGRESS NOTE ADULT - PROBLEM SELECTOR PLAN 1
goal SBP <180  continue home metoprolol  Labile blood pressure, ranging from -170.  Hold off on changing regimen, a patient is still spiking fevers.

## 2023-10-23 ENCOUNTER — TRANSCRIPTION ENCOUNTER (OUTPATIENT)
Age: 88
End: 2023-10-23

## 2023-10-23 VITALS
SYSTOLIC BLOOD PRESSURE: 136 MMHG | OXYGEN SATURATION: 98 % | DIASTOLIC BLOOD PRESSURE: 76 MMHG | HEART RATE: 61 BPM | RESPIRATION RATE: 17 BRPM | TEMPERATURE: 97 F

## 2023-10-23 DIAGNOSIS — U07.1 COVID-19: ICD-10-CM

## 2023-10-23 DIAGNOSIS — Z86.73 PERSONAL HISTORY OF TRANSIENT ISCHEMIC ATTACK (TIA), AND CEREBRAL INFARCTION WITHOUT RESIDUAL DEFICITS: ICD-10-CM

## 2023-10-23 LAB
ALBUMIN SERPL ELPH-MCNC: 3.3 G/DL — SIGNIFICANT CHANGE UP (ref 3.3–5)
ALBUMIN SERPL ELPH-MCNC: 3.3 G/DL — SIGNIFICANT CHANGE UP (ref 3.3–5)
ALP SERPL-CCNC: 59 U/L — SIGNIFICANT CHANGE UP (ref 40–120)
ALP SERPL-CCNC: 59 U/L — SIGNIFICANT CHANGE UP (ref 40–120)
ALT FLD-CCNC: 13 U/L — SIGNIFICANT CHANGE UP (ref 10–45)
ALT FLD-CCNC: 13 U/L — SIGNIFICANT CHANGE UP (ref 10–45)
ANION GAP SERPL CALC-SCNC: 8 MMOL/L — SIGNIFICANT CHANGE UP (ref 5–17)
ANION GAP SERPL CALC-SCNC: 8 MMOL/L — SIGNIFICANT CHANGE UP (ref 5–17)
AST SERPL-CCNC: 31 U/L — SIGNIFICANT CHANGE UP (ref 10–40)
AST SERPL-CCNC: 31 U/L — SIGNIFICANT CHANGE UP (ref 10–40)
BASOPHILS # BLD AUTO: 0.01 K/UL — SIGNIFICANT CHANGE UP (ref 0–0.2)
BASOPHILS # BLD AUTO: 0.01 K/UL — SIGNIFICANT CHANGE UP (ref 0–0.2)
BASOPHILS NFR BLD AUTO: 0.1 % — SIGNIFICANT CHANGE UP (ref 0–2)
BASOPHILS NFR BLD AUTO: 0.1 % — SIGNIFICANT CHANGE UP (ref 0–2)
BILIRUB SERPL-MCNC: 0.3 MG/DL — SIGNIFICANT CHANGE UP (ref 0.2–1.2)
BILIRUB SERPL-MCNC: 0.3 MG/DL — SIGNIFICANT CHANGE UP (ref 0.2–1.2)
BUN SERPL-MCNC: 20 MG/DL — SIGNIFICANT CHANGE UP (ref 7–23)
BUN SERPL-MCNC: 20 MG/DL — SIGNIFICANT CHANGE UP (ref 7–23)
CALCIUM SERPL-MCNC: 8.6 MG/DL — SIGNIFICANT CHANGE UP (ref 8.4–10.5)
CALCIUM SERPL-MCNC: 8.6 MG/DL — SIGNIFICANT CHANGE UP (ref 8.4–10.5)
CHLORIDE SERPL-SCNC: 108 MMOL/L — SIGNIFICANT CHANGE UP (ref 96–108)
CHLORIDE SERPL-SCNC: 108 MMOL/L — SIGNIFICANT CHANGE UP (ref 96–108)
CO2 SERPL-SCNC: 22 MMOL/L — SIGNIFICANT CHANGE UP (ref 22–31)
CO2 SERPL-SCNC: 22 MMOL/L — SIGNIFICANT CHANGE UP (ref 22–31)
CREAT SERPL-MCNC: 0.83 MG/DL — SIGNIFICANT CHANGE UP (ref 0.5–1.3)
CREAT SERPL-MCNC: 0.83 MG/DL — SIGNIFICANT CHANGE UP (ref 0.5–1.3)
EGFR: 68 ML/MIN/1.73M2 — SIGNIFICANT CHANGE UP
EGFR: 68 ML/MIN/1.73M2 — SIGNIFICANT CHANGE UP
EOSINOPHIL # BLD AUTO: 0 K/UL — SIGNIFICANT CHANGE UP (ref 0–0.5)
EOSINOPHIL # BLD AUTO: 0 K/UL — SIGNIFICANT CHANGE UP (ref 0–0.5)
EOSINOPHIL NFR BLD AUTO: 0 % — SIGNIFICANT CHANGE UP (ref 0–6)
EOSINOPHIL NFR BLD AUTO: 0 % — SIGNIFICANT CHANGE UP (ref 0–6)
GLUCOSE SERPL-MCNC: 126 MG/DL — HIGH (ref 70–99)
GLUCOSE SERPL-MCNC: 126 MG/DL — HIGH (ref 70–99)
HCT VFR BLD CALC: 37 % — SIGNIFICANT CHANGE UP (ref 34.5–45)
HCT VFR BLD CALC: 37 % — SIGNIFICANT CHANGE UP (ref 34.5–45)
HGB BLD-MCNC: 12.5 G/DL — SIGNIFICANT CHANGE UP (ref 11.5–15.5)
HGB BLD-MCNC: 12.5 G/DL — SIGNIFICANT CHANGE UP (ref 11.5–15.5)
IMM GRANULOCYTES NFR BLD AUTO: 0.3 % — SIGNIFICANT CHANGE UP (ref 0–0.9)
IMM GRANULOCYTES NFR BLD AUTO: 0.3 % — SIGNIFICANT CHANGE UP (ref 0–0.9)
LYMPHOCYTES # BLD AUTO: 1.49 K/UL — SIGNIFICANT CHANGE UP (ref 1–3.3)
LYMPHOCYTES # BLD AUTO: 1.49 K/UL — SIGNIFICANT CHANGE UP (ref 1–3.3)
LYMPHOCYTES # BLD AUTO: 20.6 % — SIGNIFICANT CHANGE UP (ref 13–44)
LYMPHOCYTES # BLD AUTO: 20.6 % — SIGNIFICANT CHANGE UP (ref 13–44)
MAGNESIUM SERPL-MCNC: 1.8 MG/DL — SIGNIFICANT CHANGE UP (ref 1.6–2.6)
MAGNESIUM SERPL-MCNC: 1.8 MG/DL — SIGNIFICANT CHANGE UP (ref 1.6–2.6)
MCHC RBC-ENTMCNC: 30.1 PG — SIGNIFICANT CHANGE UP (ref 27–34)
MCHC RBC-ENTMCNC: 30.1 PG — SIGNIFICANT CHANGE UP (ref 27–34)
MCHC RBC-ENTMCNC: 33.8 GM/DL — SIGNIFICANT CHANGE UP (ref 32–36)
MCHC RBC-ENTMCNC: 33.8 GM/DL — SIGNIFICANT CHANGE UP (ref 32–36)
MCV RBC AUTO: 89.2 FL — SIGNIFICANT CHANGE UP (ref 80–100)
MCV RBC AUTO: 89.2 FL — SIGNIFICANT CHANGE UP (ref 80–100)
MONOCYTES # BLD AUTO: 0.63 K/UL — SIGNIFICANT CHANGE UP (ref 0–0.9)
MONOCYTES # BLD AUTO: 0.63 K/UL — SIGNIFICANT CHANGE UP (ref 0–0.9)
MONOCYTES NFR BLD AUTO: 8.7 % — SIGNIFICANT CHANGE UP (ref 2–14)
MONOCYTES NFR BLD AUTO: 8.7 % — SIGNIFICANT CHANGE UP (ref 2–14)
NEUTROPHILS # BLD AUTO: 5.1 K/UL — SIGNIFICANT CHANGE UP (ref 1.8–7.4)
NEUTROPHILS # BLD AUTO: 5.1 K/UL — SIGNIFICANT CHANGE UP (ref 1.8–7.4)
NEUTROPHILS NFR BLD AUTO: 70.3 % — SIGNIFICANT CHANGE UP (ref 43–77)
NEUTROPHILS NFR BLD AUTO: 70.3 % — SIGNIFICANT CHANGE UP (ref 43–77)
NRBC # BLD: 0 /100 WBCS — SIGNIFICANT CHANGE UP (ref 0–0)
NRBC # BLD: 0 /100 WBCS — SIGNIFICANT CHANGE UP (ref 0–0)
PHOSPHATE SERPL-MCNC: 3.8 MG/DL — SIGNIFICANT CHANGE UP (ref 2.5–4.5)
PHOSPHATE SERPL-MCNC: 3.8 MG/DL — SIGNIFICANT CHANGE UP (ref 2.5–4.5)
PLATELET # BLD AUTO: 232 K/UL — SIGNIFICANT CHANGE UP (ref 150–400)
PLATELET # BLD AUTO: 232 K/UL — SIGNIFICANT CHANGE UP (ref 150–400)
POTASSIUM SERPL-MCNC: 4.1 MMOL/L — SIGNIFICANT CHANGE UP (ref 3.5–5.3)
POTASSIUM SERPL-MCNC: 4.1 MMOL/L — SIGNIFICANT CHANGE UP (ref 3.5–5.3)
POTASSIUM SERPL-SCNC: 4.1 MMOL/L — SIGNIFICANT CHANGE UP (ref 3.5–5.3)
POTASSIUM SERPL-SCNC: 4.1 MMOL/L — SIGNIFICANT CHANGE UP (ref 3.5–5.3)
PROT SERPL-MCNC: 6.2 G/DL — SIGNIFICANT CHANGE UP (ref 6–8.3)
PROT SERPL-MCNC: 6.2 G/DL — SIGNIFICANT CHANGE UP (ref 6–8.3)
RBC # BLD: 4.15 M/UL — SIGNIFICANT CHANGE UP (ref 3.8–5.2)
RBC # BLD: 4.15 M/UL — SIGNIFICANT CHANGE UP (ref 3.8–5.2)
RBC # FLD: 13.2 % — SIGNIFICANT CHANGE UP (ref 10.3–14.5)
RBC # FLD: 13.2 % — SIGNIFICANT CHANGE UP (ref 10.3–14.5)
SODIUM SERPL-SCNC: 138 MMOL/L — SIGNIFICANT CHANGE UP (ref 135–145)
SODIUM SERPL-SCNC: 138 MMOL/L — SIGNIFICANT CHANGE UP (ref 135–145)
WBC # BLD: 7.25 K/UL — SIGNIFICANT CHANGE UP (ref 3.8–10.5)
WBC # BLD: 7.25 K/UL — SIGNIFICANT CHANGE UP (ref 3.8–10.5)
WBC # FLD AUTO: 7.25 K/UL — SIGNIFICANT CHANGE UP (ref 3.8–10.5)
WBC # FLD AUTO: 7.25 K/UL — SIGNIFICANT CHANGE UP (ref 3.8–10.5)

## 2023-10-23 PROCEDURE — 99239 HOSP IP/OBS DSCHRG MGMT >30: CPT | Mod: GC

## 2023-10-23 RX ORDER — DABIGATRAN ETEXILATE MESYLATE 150 MG/1
1 CAPSULE ORAL
Qty: 0 | Refills: 0 | DISCHARGE

## 2023-10-23 RX ORDER — DABIGATRAN ETEXILATE MESYLATE 150 MG/1
1 CAPSULE ORAL
Qty: 0 | Refills: 0 | DISCHARGE
Start: 2023-10-23

## 2023-10-23 RX ADMIN — PANTOPRAZOLE SODIUM 40 MILLIGRAM(S): 20 TABLET, DELAYED RELEASE ORAL at 07:03

## 2023-10-23 RX ADMIN — Medication 75 MILLIGRAM(S): at 09:17

## 2023-10-23 RX ADMIN — Medication 75 MICROGRAM(S): at 07:03

## 2023-10-23 RX ADMIN — BUDESONIDE AND FORMOTEROL FUMARATE DIHYDRATE 2 PUFF(S): 160; 4.5 AEROSOL RESPIRATORY (INHALATION) at 09:17

## 2023-10-23 RX ADMIN — DABIGATRAN ETEXILATE MESYLATE 150 MILLIGRAM(S): 150 CAPSULE ORAL at 09:16

## 2023-10-23 NOTE — DISCHARGE NOTE PROVIDER - NSDCFUSCHEDAPPT_GEN_ALL_CORE_FT
Nellie Olivo  [facility]  [facility] [address]  Scheduled Appointment: 12/15/2023    [facility]  [facility] [address]  Scheduled Appointment: 01/02/2024

## 2023-10-23 NOTE — PROGRESS NOTE ADULT - PROBLEM SELECTOR PLAN 1
goal SBP <180  continue home metoprolol Presented with asymptomatic COVID+ on admission, saturating >95% on room air. Tmax 101.7 on 10/21 despite 1 dose tylenol. Full infectious work up on 10/21: CXR no infiltrates; blood cultures NGTD, WBC 6, Lactate 1.3, procalc 0.04. Less likely superimposed bacterial infection upon Covid. 1 day of decadron given. Remesdivir started on 10/20  -continue remedesivir  -d/c decadron Presented with asymptomatic COVID+ on admission, saturating >95% on room air. Afebrile at admission, but developed Tmax 101.7 on 10/21 despite 1 dose Tylenol. Full infectious work up on 10/21 showed CXR no infiltrates; blood cultures NGTD, WBC 6, Lactate 1.3, procalcitonin 0.04. Less likely superimposed bacterial infection upon Covid. 1 day of decadron given. Remdesivir started on 10/20  -continue Remdesivir   -d/c decadron  -yearly influenza vaccine administered   -follow up outpatient PCP in two weeks, return earlier if symptoms worsen  -COVID isolation precautions for 10 days after testing positive

## 2023-10-23 NOTE — PROGRESS NOTE ADULT - ASSESSMENT
87y Female with PMHx of hypertension, atrial fibrillation (on Pradaxa, previously intolerant of other DOACs), hypothyroid, CAD status post PCI, pulmonary sarcoidosis, asthma, TIA/stroke, and SSS status post PPM presenting to ED for episode of hypertension up to sBP 179 yesterday. While speaking with her friend on the phone last night, pt with a couple second episode of difficulty recalling a word "it was on the tip of my tongue." which quickly resolved. Pt states she had another episode of this today when speaking with ED provider, also quickly resolved. Stroke consulted for these episodes. Pt otherwise denies headache, visual disturbances, weakness/numbness of extremities, slurred speech, gait instability, dizziness.    87y female with PMHx of hypertension, atrial fibrillation (on Pradaxa, previously intolerant of other DOACs), hypothyroid on Synthyroid, CAD status post PCI, pulmonary sarcoidosis, asthma, TIA/stroke history, and SSS status post PPM presenting to ED for mechanical fall for 11 hours when getting out of bed, admitted for trauma survey work up, physical therapy evaluation, and COVID+ infection, who remains hemodynamically stable.

## 2023-10-23 NOTE — PROGRESS NOTE ADULT - PROBLEM SELECTOR PLAN 7
F: None  E: Replete PRN   DVT ppx: home dose Pradaxa 150 Q12  Code: Full Home med Pradaxa 150 Q12 (previously intolerant of other DOACs). No Afib with RVR currently, HR<100.   - C/w home med

## 2023-10-23 NOTE — DISCHARGE NOTE NURSING/CASE MANAGEMENT/SOCIAL WORK - NSDCPEFALRISK_GEN_ALL_CORE
For information on Fall & Injury Prevention, visit: [email or web address] OR  [email or web address] OR  https://www.cdc.gov/steadi/patient.html

## 2023-10-23 NOTE — DISCHARGE NOTE NURSING/CASE MANAGEMENT/SOCIAL WORK - PATIENT PORTAL LINK FT
You can access the FollowMyHealth Patient Portal offered by [facility] by registering at the following website: [email or web address]. By joining [facility]’s FollowMyHealth portal, you will also be able to view your health information using other applications (apps) compatible with our system.

## 2023-10-23 NOTE — DISCHARGE NOTE PROVIDER - NSDCHHASSISTOTHER_GEN_ALL_CORE_FT
Patient had recent mechanical fall and lives alone. Recommend home physical therapy to strengthen as she begins activities of daily living again.

## 2023-10-23 NOTE — DISCHARGE NOTE PROVIDER - NSDCMRMEDTOKEN_GEN_ALL_CORE_FT
budesonide-formoterol 160 mcg-4.5 mcg/inh inhalation aerosol: 2 puff(s) inhaled once a day  levothyroxine 75 mcg (0.075 mg) oral capsule: 1 orally  Metoprolol Succinate ER 50 mg oral tablet, extended release: 1.5 tab(s) orally 2 times a day  Pradaxa 150 mg oral capsule: 1 cap(s) orally 2 times a day  pravastatin 80 mg oral tablet: 1 tab(s) orally once a day   budesonide-formoterol 160 mcg-4.5 mcg/inh inhalation aerosol: 2 puff(s) inhaled once a day  dabigatran 150 mg oral capsule: 1 cap(s) orally every 12 hours  levothyroxine 75 mcg (0.075 mg) oral capsule: 1 capsule orally once a day  Metoprolol Succinate ER 50 mg oral tablet, extended release: 1.5 tab(s) orally 2 times a day  pravastatin 80 mg oral tablet: 1 tab(s) orally once a day   budesonide-formoterol 160 mcg-4.5 mcg/inh inhalation aerosol: 2 puff(s) inhaled 2 times a day  dabigatran 150 mg oral capsule: 1 cap(s) orally every 12 hours  levothyroxine 75 mcg (0.075 mg) oral capsule: 1 capsule orally once a day  Metoprolol Succinate ER 50 mg oral tablet, extended release: 1.5 tab(s) orally 2 times a day  pravastatin 80 mg oral tablet: 1 tab(s) orally once a day

## 2023-10-23 NOTE — PROGRESS NOTE ADULT - SUBJECTIVE AND OBJECTIVE BOX
Physical Medicine and Rehabilitation Progress Note :       Patient is a 88y old  Female who presents with a chief complaint of Covid, mechanical fall (23 Oct 2023 11:03)      HPI:  88F PMHx Sarcoidosis, HTN, Afib (c/b TIA no residual deficits, on pradaxa), hypothyroidism, , CAD, Sick sinus syndrome s/p PPM placement,  p/w fall iso getting out of bed (elevated off of ground), she stepped onto a stool however she slipped and fell onto a hardwood floor. She hit her head on her L side however did not syncopize. Pt was unable to get up for 11 hours, she had too much pain on her L side and felt too weak. She climbed back into her bed and her family found her there. Pt was unable to get up with assistance so EMS called.     Pt denies prodromal sx, no CP, SOB, dizziness, lightheadedness before fall.   Pt denies syncope, urinary/fecal incontinence.  Pt reports pain and weakness after fall, however she states she was mainly limited by pain.     Patient has fallen 2x before, never hospitalized for it. Most recently a few weeks ago, with similar characteristics as this fall ?mechanical fall.   ED Vitals: Tmax 102.7 rectal --> 98.7F oral HR 69, /73. SPO2 95 RA  ED Labs: WBC 8.8 diff WNL, Hgb 12.8, Plt 209. CMP notable for eGFR 55 iso BUN/Cr 14/0.99 otherwise WNL. Lactate 1.3. Mild CK elevation to 762. TSH WNL 0.72, T4 WNL 8.7.   ED Studies: UA neg WBC/Bacteria/LE/N. Negative blood and RBC. RVP Covid positive.   CTH: No acute pathology. +Scattered chronic ischemic changes.   CT C-spine: No fracture.   EKG: Sinus bradycardia, no clear a-pacer spikes noted per author  ED Interventions: Ofirmev 1g x1, CTX 1g x1, 2L NS   (20 Oct 2023 22:07)                            12.5   7.25  )-----------( 232      ( 23 Oct 2023 05:30 )             37.0       10-23    138  |  108  |  20  ----------------------------<  126<H>  4.1   |  22  |  0.83    Ca    8.6      23 Oct 2023 05:30  Phos  3.8     10-23  Mg     1.8     10-23    TPro  6.2  /  Alb  3.3  /  TBili  0.3  /  DBili  x   /  AST  31  /  ALT  13  /  AlkPhos  59  10-23    Vital Signs Last 24 Hrs  T(C): 36.6 (23 Oct 2023 08:57), Max: 37.1 (22 Oct 2023 17:32)  T(F): 97.9 (23 Oct 2023 08:57), Max: 98.8 (22 Oct 2023 17:32)  HR: 65 (23 Oct 2023 08:57) (57 - 65)  BP: 166/72 (23 Oct 2023 08:57) (134/67 - 166/72)  BP(mean): --  RR: 18 (23 Oct 2023 08:57) (18 - 18)  SpO2: 96% (23 Oct 2023 08:57) (95% - 97%)    Parameters below as of 23 Oct 2023 08:57  Patient On (Oxygen Delivery Method): room air        MEDICATIONS  (STANDING):  atorvastatin 20 milliGRAM(s) Oral at bedtime  budesonide 160 MICROgram(s)/formoterol 4.5 MICROgram(s) Inhaler 2 Puff(s) Inhalation two times a day  dabigatran 150 milliGRAM(s) Oral every 12 hours  influenza  Vaccine (HIGH DOSE) 0.7 milliLiter(s) IntraMuscular once  levothyroxine 75 MICROGram(s) Oral daily  metoprolol succinate ER 75 milliGRAM(s) Oral every 12 hours  remdesivir  IVPB 100 milliGRAM(s) IV Intermittent once    MEDICATIONS  (PRN):  acetaminophen     Tablet .. 650 milliGRAM(s) Oral every 6 hours PRN Temp greater or equal to 38C (100.4F), Mild Pain (1 - 3)  aluminum hydroxide/magnesium hydroxide/simethicone Suspension 30 milliLiter(s) Oral every 4 hours PRN Dyspepsia  melatonin 3 milliGRAM(s) Oral at bedtime PRN Insomnia  ondansetron Injectable 4 milliGRAM(s) IV Push every 8 hours PRN Nausea and/or Vomiting          Initial Functional Status Assessment :       Previous Level of Function:     · Ambulation Skills	independent; needs device  · Transfer Skills	independent; needs device  · ADL Skills	independent; needs device  · Work/Leisure Activity	independent; needs device  · Additional Comments	Prior to admission pt reports living alone in a house with 13 steps to enter. Pt reports that other ppl live in level below her (non-family) that can provide some support if needed. Pt amb with cane in the community, indep with ADLs prior.    Cognitive Status Examination:   · Orientation	person; place; time  · Level of Consciousness	alert  · Follows Commands and Answers Questions	100% of the time  · Personal Safety and Judgment	intact  · Short Term Memory	intact    Range of Motion Exam:   · Range of Motion Examination	bilateral upper extremity ROM was WFL (within functional limits); bilateral lower extremity ROM was WFL (within functional limits)    Manual Muscle Testing:   · Manual Muscle Testing Results	no strength deficits were identified    Bed Mobility: Sit to Supine:     · Level of [place]	contact guard  · Physical Assist/Nonphysical Assist	1 person assist    Bed Mobility: Supine to Sit:     · Level of [place]	contact guard  · Physical Assist/Nonphysical Assist	1 person assist    Transfer: Sit to Stand:     · Level of [place]	minimum assist (75% patients effort)  · Physical Assist/Nonphysical Assist	1 person assist  · Weight-Bearing Restrictions	weight-bearing as tolerated  · Assistive Device	rolling walker    Transfer: Stand to Sit:     · Level of [place]	minimum assist (75% patients effort)  · Physical Assist/Nonphysical Assist	1 person assist  · Weight-Bearing Restrictions	weight-bearing as tolerated  · Assistive Device	rolling walker    Gait Skills:     · Level of [place]	contact guard  · Physical Assist/Nonphysical Assist	1 person assist  · Weight-Bearing Restrictions	weight-bearing as tolerated  · Assistive Device	rolling walker  · Gait Distance	25 feet    Gait Analysis:     · Gait Pattern Used	3-point gait  · Gait Deviations Noted	increased time in double stance; decreased step length; decreased stride length; decreased weight-shifting ability  · Impairments Contributing to Gait Deviations	impaired motor control; impaired postural control; decreased strength    Balance Skills Assessment:     · Sitting Balance: Static	good balance  · Sitting Balance: Dynamic	good-  · Standing Balance: Static	good balance  · Standing Balance: Dynamic	fair balance  · Systems Impairment Contributing to Balance Disturbance	musculoskeletal  · Identified Impairments Contributing to Balance Disturbance	impaired motor control; impaired postural control; decreased strength    Sensory Examination:   Sensory Examination:    Grossly Intact:   · Gross Sensory Examination	Grossly Intact      Clinical Impressions:   · Criteria for Skilled Therapeutic Interventions	impairments found; rehab potential; anticipated equipment needs at discharge; therapy frequency; anticipated discharge recommendation; functional limitations in following categories; predicted duration of therapy intervention; risk reduction/prevention  · Impairments Found (describe specific impairments)	aerobic capacity/endurance; gait, locomotion, and balance; muscle strength; posture; ergonomics and body mechanics; gross motor  · Functional Limitations in Following Categories (describe specific limitations)	self-care; home management; community/leisure  · Risk Reduction/Prevention (Describe Specific Areas of risk reduction/prevention)	risk factors  · Risk Areas	fall        PM&R Impression : as above    Current disposition plan recommendation :    subacute rehab placement

## 2023-10-23 NOTE — PROGRESS NOTE ADULT - PROBLEM SELECTOR PLAN 6
Home med Pradaxa 150 Q12. No concern for Afib with RVR currently, HR<100.   - C/w home med History of CAD s/p PCI. on home prasuvastatin 80 mg  - C/w atorvastatin 20 while in patient; d/c on home statin med

## 2023-10-23 NOTE — DISCHARGE NOTE PROVIDER - ATTENDING DISCHARGE PHYSICAL EXAMINATION:
Patient admitted for fall in setting of COVID infection. Remained clinically stable, maintaning 02 saturation on RA, s/p Remdesivir. She was evaluated by PT, patient refused GRANT and opted for home PT.   On day of discharge, patient reports feeling at baseline, denies any complaints. No SOB, no chest pain, no abdominal pain, no N/V, tolerating diet. No other complaints or events reported  General: AOX3, NAD, lying in bed, speaking in full sentences, no labored breathing on RA  HEENT: AT/NC, no facial asymmetry  Lungs: no crackles, no wheezes  Abdomen: soft, non-tender, no guarding, + BS  Extremities:  warm, no edema, no tenderness, no focal deficit    Follow-up with PCP

## 2023-10-23 NOTE — PROGRESS NOTE ADULT - SUBJECTIVE AND OBJECTIVE BOX
Electrophysiology Device Interrogation       Indication: syncope/fall    Device model: 	Medtronic dual-chamber ppm 			                            Functioning Mode: DDD		    Underlying Rhythm:  Sinus bradycardia    Pacemaker dependency: AP: 82.4%, : <0.1%    Battery status: 13.6 years     Interrogating parameters:   				RA			RV			  Sense:                                 2.5 mV                               7.8 mV                         Threshold:                             0.750 V @ 0.40 ms            0.875 V @ 0.40 ms                                                                                        Pacing Impedance:                  380 ohms                          494 ohm                                                                                                                                                                            Events/Alert:  1 episode of NSVT on 10/01/2023, terminated on its on.     Parameter change: None      Assessment: Normal functioning ppm. No sinus bradycardia, arrhythmias or pauses that would have lead to patient's episode of fall. Pacemaker site examined - stable, without excessive ecchymosis, erythremia or hematoma

## 2023-10-23 NOTE — PROGRESS NOTE ADULT - ASSESSMENT
{\rtf1\nrfwdl26402\ansi\iqszsuc0770\ftnbj\uc1\deff0  {\fonttbl{\f0 \fnil Segoe UI;}{\f1 \fnil \fcharset0 Segoe UI;}{\f2 \fnil Times New Irineo;}}  {\colortbl ;\cyi229\buuze136\dggp177 ;\red0\green0\blue0 ;\red0\green0\gbrm751 ;\red0\green0\blue0 ;}  {\stylesheet{\f0\fs20 Normal;}{\cs1 Default Paragraph Font;}{\cs2\f0\fs16 Line Number;}{\cs3\f2\fs24\ul\cf3 Hyperlink;}}  {\*\revtbl{Unknown;}}  \qvqnuh26205\daxbfj85983\anogb7773\yhaem4561\mmvri8488\vfhjv4721\hfwcazb515\fpyrztp150\nogrowautofit\ileobf309\formshade\nofeaturethrottle1\dntblnsbdb\fet4\aendnotes\aftnnrlc\pgbrdrhead\pgbrdrfoot  \sectd\cdwjpm03786\cxlbef69633\guttersxn0\ciontqrv8070\enrntvqa7774\rumwmpry0000\wxddeopb2573\mztmeky760\nbnedxw843\sbkpage\pgncont\pgndec  \plain\plain\f0\fs24\ql\plain\f0\fs24\plain\f0\fs20\pewa9500\hich\f0\dbch\f0\loch\f0\fs20\par  I M\par  \par  87 y o Female with PMHx of hypertension, atrial fibrillation (on Pradaxa, previously intolerant of other DOACs), hypothyroid, CAD status post PCI, pulmonary sarcoidosis, asthma, TIA/stroke, and SSS status post PPM presenting to ED for episode of hypertension   up to sBP 179 yesterday. While speaking with her friend on the phone last night, pt with a couple second episode of difficulty recalling a word "it was on the tip of my tongue." which quickly resolved. Pt states she had another episode of this today when   speaking with ED provider, also quickly resolved. Stroke consulted for these episodes. Pt otherwise denies headache, visual disturbances, weakness/numbness of extremities, slurred speech, gait instability, dizziness. \par  \par  \plain\f1\fs20\bykx7540\hich\f1\dbch\f1\loch\f1\cf2\fs20\ul{\field{\*\fldinst HYPERLINK [id number] }{\fldrslt Problem/Plan - 1:}}\plain\f0\fs20\qrym5256\hich\f0\dbch\f0\loch\f0\fs20\ql\par  \'b7  {\*\bkmkstart [id number]}{\*\bkmkend hn52973255074}Problem: {\*\bkmkstart ki32974691178}{\*\bkmkend kf12439223122}COVID.\plain\f1\fs20\qrpv2280\hich\f1\dbch\f1\loch\f1\cf2\fs20\strike\plain\f0\fs20\mlsy2741\hich\f0\dbch\f0\loch\f0\fs20\par  \'b7  {\*\bkmkstart lh39621411501}{\*\bkmkend nv45432863923}Plan: {\*\bkmkstart ym71477967770}{\*\bkmkend au02916619602}Presented with asymptomatic COVID+ on admission, saturating >95% on room air. Tmax 101.7 on 10/21 despite 1 dose tylenol. Full infectious   work up on 10/21: CXR no infiltrates; blood cultures NGTD, WBC 6, Lactate 1.3, procalc 0.04. Less likely superimposed bacterial infection upon Covid. 1 day of decadron given. Remesdivir started on 10/20\par  -continue remedesivir\par  -d/c decadron.\plain\f1\fs20\qyyq8256\hich\f1\dbch\f1\loch\f1\cf2\fs20\strike\plain\f0\fs20\cftj9796\hich\f0\dbch\f0\loch\f0\fs20\par  \par  \plain\f1\fs20\cnwg2223\hich\f1\dbch\f1\loch\f1\cf2\fs20\ul{\field{\*\fldinst HYPERLINK [id number] }{\fldrslt Problem/Plan - 2:}}\plain\f0\fs20\oxpb1339\hich\f0\dbch\f0\loch\f0\fs20\ql\par  \'b7  {\*\bkmkstart vc73675677048}{\*\bkmkend bu35514381478}Problem: {\*\bkmkstart mc58773198772}{\*\bkmkend dp70888793842}Fall.\plain\f1\fs20\ccxk1344\hich\f1\dbch\f1\loch\f1\cf2\fs20\strike\plain\f0\fs20\hvty5306\hich\f0\dbch\f0\loch\f0\fs20\par  \'b7  {\*\bkmkstart ve90149068024}{\*\bkmkend bx31395102092}Plan: {\*\bkmkstart sd26190888801}{\*\bkmkend ki41430289898}Mechanical fall\par  -f/u orthostatics\par  -f/u EP to evaluate PPM to r/o cardiogenic syncope.\plain\f1\fs20\xwxi4839\hich\f1\dbch\f1\loch\f1\cf2\fs20\strike\plain\f0\fs20\xjnd1648\hich\f0\dbch\f0\loch\f0\fs20\par  \par  \plain\f1\fs20\xsnr4218\hich\f1\dbch\f1\loch\f1\cf2\fs20\ul{\field{\*\fldinst HYPERLINK [id number] }{\fldrslt Problem/Plan - 3:}}\plain\f0\fs20\stkk2293\hich\f0\dbch\f0\loch\f0\fs20\ql\par  \'b7  {\*\bkmkstart dg07881654579}{\*\bkmkend ka15866198630}Problem: {\*\bkmkstart rx47875738880}{\*\bkmkend nc30611636381}HTN (hypertension).\plain\f1\fs20\xvkm7328\hich\f1\dbch\f1\loch\f1\cf2\fs20\strike\plain\f0\fs20\mqam8295\hich\f0\dbch\f0\loch\f0\fs20\par  \'b7  {\*\bkmkstart tw47248356262}{\*\bkmkend vr21826890088}Plan: {\*\bkmkstart jx96211725386}{\*\bkmkend qp39657485500}Goal SBP <180. Elevated BP last 24 hours likely secondary to 1.5 days of decadron for COVID.\par  -continue home metoprolol.\plain\f1\fs20\kwiy0445\hich\f1\dbch\f1\loch\f1\cf2\fs20\strike\plain\f0\fs20\zmhs2294\hich\f0\dbch\f0\loch\f0\fs20\par  \par  \plain\f1\fs20\szxs0233\hich\f1\dbch\f1\loch\f1\cf2\fs20\ul{\field{\*\fldinst HYPERLINK [id number] }{\fldrslt Problem/Plan - 4:}}\plain\f0\fs20\zehj4156\hich\f0\dbch\f0\loch\f0\fs20\ql\par  \'b7  {\*\bkmkstart qf18406792610}{\*\bkmkend zq83378776166}Problem: {\*\bkmkstart ly40787892862}{\*\bkmkend mr21638387635}Hypothyroidism.\plain\f1\fs20\uxeo0574\hich\f1\dbch\f1\loch\f1\cf2\fs20\strike\plain\f0\fs20\lmxl1429\hich\f0\dbch\f0\loch\f0\fs20\par  \'b7  {\*\bkmkstart xn77828955456}{\*\bkmkend sk09829148231}Plan: {\*\bkmkstart mj02285061184}{\*\bkmkend kn68130583670}Home med Synthroid 75 Qd\par  - C/w home med.\plain\f1\fs20\syiu1195\hich\f1\dbch\f1\loch\f1\cf2\fs20\strike\plain\f0\fs20\hawd4501\hich\f0\dbch\f0\loch\f0\fs20\par  \par  \plain\f1\fs20\uwyx3665\hich\f1\dbch\f1\loch\f1\cf2\fs20\ul{\field{\*\fldinst HYPERLINK [id number] }{\fldrslt Problem/Plan - 5:}}\plain\f0\fs20\lzrs2883\hich\f0\dbch\f0\loch\f0\fs20\ql\par  \'b7  {\*\bkmkstart kp99685946168}{\*\bkmkend bv76861497451}Problem: {\*\bkmkstart ss65150767203}{\*\bkmkend cu43648865073}CAD (coronary artery disease). \par  \'b7  {\*\bkmkstart xf44416548754}{\*\bkmkend nz64541071060}Plan: {\*\bkmkstart nq05203134947}{\*\bkmkend uv94768597747}Home med Pradaxa 150 Q12 \par  - C/w home med.\par  \par  \plain\f1\fs20\gtty6388\hich\f1\dbch\f1\loch\f1\cf2\fs20\ul{\field{\*\fldinst HYPERLINK [id number] }{\fldrslt Problem/Plan - 6:}}\plain\f0\fs20\zwqb3364\hich\f0\dbch\f0\loch\f0\fs20\ql\par  \'b7  {\*\bkmkstart uq96784561898}{\*\bkmkend nb12970091299}Problem: {\*\bkmkstart sq72253877509}{\*\bkmkend ai85163890435}Atrial fibrillation. \par  \'b7  {\*\bkmkstart zz72459253733}{\*\bkmkend vp34345050720}Plan: {\*\bkmkstart lo04142411574}{\*\bkmkend uc00471765821}Home med Pradaxa 150 Q12. No concern for Afib with RVR currently, HR<100. \par  - C/w home med.\par  \par  \plain\f1\fs20\kykc1547\hich\f1\dbch\f1\loch\f1\cf2\fs20\ul{\field{\*\fldinst HYPERLINK [id number] }{\fldrslt Problem/Plan - 7:}}\plain\f0\fs20\wtty0851\hich\f0\dbch\f0\loch\f0\fs20\ql\par  \'b7  {\*\bkmkstart lf66475963646}{\*\bkmkend zf12963518371}Problem: {\*\bkmkstart nu94935334903}{\*\bkmkend wf78972268841}Prophylactic measure. \par  \'b7  {\*\bkmkstart pn54623873688}{\*\bkmkend yl42446523523}Plan: {\*\bkmkstart ra72890081564}{\*\bkmkend oy05643850420}F: None\par  E: Replete PRN \par  DVT ppx: home dose Pradaxa 150 Q12\par  Code: Full{\*\bkmkstart bkcommentCR}{\*\bkmkend bkcommentCR}.\par  \par  \par  \par  }

## 2023-10-23 NOTE — PROGRESS NOTE ADULT - PROBLEM SELECTOR PLAN 5
Home med Pradaxa 150 Q12   - C/w home med Home med Synthroid 75 Qd. TSH 0.512 on this admission.  - C/w home med

## 2023-10-23 NOTE — DISCHARGE NOTE PROVIDER - NSDCCPCAREPLAN_GEN_ALL_CORE_FT
PRINCIPAL DISCHARGE DIAGNOSIS  Diagnosis: 2019 novel coronavirus disease (COVID-19)  Assessment and Plan of Treatment: You were diagnosed with the new COVID-19 coronavirus infection via a nasal swab. The treatment for this infection is supportive care, which includes: rest, maintaining adequate oral intake of food and water, and taking acetaminophen/tylenol for fever. Please maintain a strict home quarantine for 14 days at home, and wear a surgical mask at all times when you need to be in close proximity with another human being. Take tylenol as needed, every 6 hours, with a maximum daily dose of 4,000 mg a day. Please visit your nearest urgent care or emergency department should you start to experience: severe shortness of breath, severe cough/wheezing/difficulty breathing, or fever >103 for 3 days. Please maintain a good healthy diet. If you have any questions, please call your primary care provider.        SECONDARY DISCHARGE DIAGNOSES  Diagnosis: 2019 novel coronavirus disease (COVID-19)  Assessment and Plan of Treatment:     Diagnosis: Fall  Assessment and Plan of Treatment:        PRINCIPAL DISCHARGE DIAGNOSIS  Diagnosis: 2019 novel coronavirus disease (COVID-19)  Assessment and Plan of Treatment: You were diagnosed with the new COVID-19 coronavirus infection via a nasal swab. The treatment for this infection is supportive care, which includes: rest, maintaining adequate oral intake of food and water, and taking acetaminophen/tylenol for fever. Please maintain a strict home quarantine for 14 days at home, and wear a surgical mask at all times when you need to be in close proximity with another human being. Take tylenol as needed, every 6 hours, with a maximum daily dose of 4,000 mg a day. Please visit your nearest urgent care or emergency department should you start to experience: severe shortness of breath, severe cough/wheezing/difficulty breathing, or fever >103 for 3 days. Please maintain a good healthy diet. If you have any questions, please call your primary care provider.        SECONDARY DISCHARGE DIAGNOSES  Diagnosis: 2019 novel coronavirus disease (COVID-19)  Assessment and Plan of Treatment: Please continue your home meds:  Pradaxa 150 mg every 12 hours  Remdesivir IVPB 100 mg  Toprol 75 mg every 12 hours  Lipitor 20 mg  Synthyroid 75 micrograms daily  Flu shot was given    Diagnosis: Fall  Assessment and Plan of Treatment: You were admitted for a mechanical fall. As you age, your risk for falls increases, especially when living alone. It is important to utilize home physical therapy services to strengthen your tone and on exercises how to prevent future falls. If you become lightheaded or have another fall, please return to urgent care or the emergency department.    Diagnosis: Chronic atrial fibrillation  Assessment and Plan of Treatment: You were previously diagnosed with atrial fibrillation. This is an abnormal heart beat that, if left uncontrolled, can cause symptoms like chest pain and shortness of breath. Please follow up with your primary care physician for further management of your atrial fibrillation.

## 2023-10-23 NOTE — PROGRESS NOTE ADULT - PROBLEM SELECTOR PLAN 4
continue home statin Home med Synthroid 75 Qd  - C/w home med Goal SBP <180. Elevated BP last 24 hours likely secondary to 1.5 days of decadron for COVID.  -continue home metoprolol 75 mg q12

## 2023-10-23 NOTE — PROGRESS NOTE ADULT - PROBLEM SELECTOR PLAN 8
F: None  E: Replete PRN   DVT ppx: Already on home dose Pradaxa 150 Q12  Code: Full.
F: None  E: Replete K>4, Phos>3, Mg>2  DVT ppx: home dose Pradaxa 150 Q12  Code: Full code

## 2023-10-23 NOTE — PROGRESS NOTE ADULT - NSPROGADDITIONALINFOA_GEN_ALL_CORE
EP evaluation 10/23/23:    Device model: 	Medtronic dual-chamber ppm 			                          Functioning Mode: DDD		  Underlying Rhythm:  Sinus bradycardia  Pacemaker dependency: AP: 82.4%, : <0.1%  Battery status: 13.6 years   Interrogating parameters:   			RA			RV			  Sense:                                 2.5 mV                               7.8 mV                         Threshold:                             0.750 V @ 0.40 ms            0.875 V @ 0.40 ms                                                                                        Pacing Impedance:                  380 ohms                          494 ohm                                                                                                                                                                         Events/Alert:  1 episode of NSVT on 10/01/2023, terminated on its on.   Parameter change: None    Assessment: Normal functioning ppm. No sinus bradycardia, arrhythmias or pauses that would have lead to patient's episode of fall. Pacemaker site examined - stable, without excessive ecchymosis, erythremia or hematoma no

## 2023-10-23 NOTE — PROGRESS NOTE ADULT - PROBLEM SELECTOR PLAN 3
continue home pradaxa Goal SBP <180. Elevated BP last 24 hours likely secondary to 1.5 days of decadron for COVID.  -continue home metoprolol #Stroke rule out  History of TIA in past, no residual deficits. Prior to admission, when speaking with her friend on the phone patient had couple second episode of difficulty recalling a word that "it was on the tip of my tongue." which quickly resolved. Pt had another episode when speaking with ED provider, also quickly resolved. Stroke consulted for these episodes. Pt otherwise denies headache, visual disturbances, weakness/numbness of extremities, slurred speech, gait instability, dizziness. Low concern for stroke per stroke team.  -CTM for symptoms

## 2023-10-23 NOTE — PROGRESS NOTE ADULT - SUBJECTIVE AND OBJECTIVE BOX
***Note in progress***    OVERNIGHT EVENTS: NAEO    SUBJECTIVE / INTERVAL HPI: Patient seen and examined at bedside. Patient denying chest pain, SOB, palpitations, cough. Patient denies fever, chills, HA, Dizziness, N/V, abdominal pain, diarrhea, constipation, hematochezia/melena, dysuria, hematuria, new onset weakness/numbness, LE pain and/or swelling.    Remaining ROS negative       PHYSICAL EXAM:    General:NAD.   HEENT: NC/AT; PERRL, anicteric sclera; MMM  Neck: supple  Cardiovascular: +S1/S2, RRR  Respiratory: CTA B/L; no W/R/R  Gastrointestinal: soft, NT/ND; +BSx4  Extremities: WWP; no edema, clubbing or cyanosis  Vascular: 2+ radial, DP/PT pulses B/L  Neurological: AAOx3; no focal deficits  Psychiatric: pleasant mood and affect  Dermatologic: no appreciable wounds or damage to the skin    VITAL SIGNS:  Vital Signs Last 24 Hrs  T(C): 36.7 (23 Oct 2023 06:05), Max: 37.1 (22 Oct 2023 17:32)  T(F): 98 (23 Oct 2023 06:05), Max: 98.8 (22 Oct 2023 17:32)  HR: 57 (23 Oct 2023 06:05) (57 - 63)  BP: 159/74 (23 Oct 2023 06:05) (134/67 - 159/74)  BP(mean): --  RR: 18 (23 Oct 2023 06:05) (18 - 18)  SpO2: 95% (23 Oct 2023 06:05) (95% - 98%)    Parameters below as of 23 Oct 2023 06:05  Patient On (Oxygen Delivery Method): room air          MEDICATIONS:  MEDICATIONS  (STANDING):  atorvastatin 20 milliGRAM(s) Oral at bedtime  budesonide 160 MICROgram(s)/formoterol 4.5 MICROgram(s) Inhaler 2 Puff(s) Inhalation two times a day  dabigatran 150 milliGRAM(s) Oral every 12 hours  dexAMETHasone     Tablet 6 milliGRAM(s) Oral daily  influenza  Vaccine (HIGH DOSE) 0.7 milliLiter(s) IntraMuscular once  levothyroxine 75 MICROGram(s) Oral daily  metoprolol succinate ER 75 milliGRAM(s) Oral every 12 hours  pantoprazole    Tablet 40 milliGRAM(s) Oral before breakfast  remdesivir  IVPB 100 milliGRAM(s) IV Intermittent once    MEDICATIONS  (PRN):  acetaminophen     Tablet .. 650 milliGRAM(s) Oral every 6 hours PRN Temp greater or equal to 38C (100.4F), Mild Pain (1 - 3)  aluminum hydroxide/magnesium hydroxide/simethicone Suspension 30 milliLiter(s) Oral every 4 hours PRN Dyspepsia  melatonin 3 milliGRAM(s) Oral at bedtime PRN Insomnia  ondansetron Injectable 4 milliGRAM(s) IV Push every 8 hours PRN Nausea and/or Vomiting      ALLERGIES:  Allergies    latex (Other)  codeine (Unknown)    Intolerances        LABS:                        12.5   7.25  )-----------( 232      ( 23 Oct 2023 05:30 )             37.0     10-23    138  |  108  |  20  ----------------------------<  126<H>  4.1   |  22  |  0.83    Ca    8.6      23 Oct 2023 05:30  Phos  3.8     10-23  Mg     1.8     10-23    TPro  6.2  /  Alb  3.3  /  TBili  0.3  /  DBili  x   /  AST  31  /  ALT  13  /  AlkPhos  59  10-23      Urinalysis Basic - ( 23 Oct 2023 05:30 )    Color: x / Appearance: x / SG: x / pH: x  Gluc: 126 mg/dL / Ketone: x  / Bili: x / Urobili: x   Blood: x / Protein: x / Nitrite: x   Leuk Esterase: x / RBC: x / WBC x   Sq Epi: x / Non Sq Epi: x / Bacteria: x      CAPILLARY BLOOD GLUCOSE          RADIOLOGY & ADDITIONAL TESTS: Reviewed. OVERNIGHT EVENTS: NAEO    SUBJECTIVE / INTERVAL HPI: Patient seen and examined at bedside. Patient denying chest pain, SOB, palpitations, cough. Patient denies fever, chills, HA, Dizziness, N/V, abdominal pain, diarrhea, constipation, hematochezia/melena, dysuria, hematuria, new onset weakness/numbness, LE pain and/or swelling. Remaining ROS negative     PHYSICAL EXAM:  General: NAD.   HEENT: NC/AT; PERRL, anicteric sclera; MMM  Neck: supple  Cardiovascular: +S1/S2, RRR  Respiratory: CTA B/L; no W/R/R  Gastrointestinal: soft, NT/ND; +BSx4  Extremities: WWP; no edema, clubbing or cyanosis  Vascular: 2+ radial, DP/PT pulses B/L  Neurological: AAOx3; no focal deficits  Psychiatric: pleasant mood and affect  Dermatologic: no appreciable wounds or damage to the skin    VITAL SIGNS:  Vital Signs Last 24 Hrs  T(C): 36.7 (23 Oct 2023 06:05), Max: 37.1 (22 Oct 2023 17:32)  T(F): 98 (23 Oct 2023 06:05), Max: 98.8 (22 Oct 2023 17:32)  HR: 57 (23 Oct 2023 06:05) (57 - 63)  BP: 159/74 (23 Oct 2023 06:05) (134/67 - 159/74)  BP(mean): --  RR: 18 (23 Oct 2023 06:05) (18 - 18)  SpO2: 95% (23 Oct 2023 06:05) (95% - 98%)    Parameters below as of 23 Oct 2023 06:05  Patient On (Oxygen Delivery Method): room air    MEDICATIONS:  MEDICATIONS  (STANDING):  atorvastatin 20 milliGRAM(s) Oral at bedtime  budesonide 160 MICROgram(s)/formoterol 4.5 MICROgram(s) Inhaler 2 Puff(s) Inhalation two times a day  dabigatran 150 milliGRAM(s) Oral every 12 hours  dexAMETHasone     Tablet 6 milliGRAM(s) Oral daily  influenza  Vaccine (HIGH DOSE) 0.7 milliLiter(s) IntraMuscular once  levothyroxine 75 MICROGram(s) Oral daily  metoprolol succinate ER 75 milliGRAM(s) Oral every 12 hours  pantoprazole    Tablet 40 milliGRAM(s) Oral before breakfast  remdesivir  IVPB 100 milliGRAM(s) IV Intermittent once    MEDICATIONS  (PRN):  acetaminophen     Tablet .. 650 milliGRAM(s) Oral every 6 hours PRN Temp greater or equal to 38C (100.4F), Mild Pain (1 - 3)  aluminum hydroxide/magnesium hydroxide/simethicone Suspension 30 milliLiter(s) Oral every 4 hours PRN Dyspepsia  melatonin 3 milliGRAM(s) Oral at bedtime PRN Insomnia  ondansetron Injectable 4 milliGRAM(s) IV Push every 8 hours PRN Nausea and/or Vomiting      ALLERGIES:  Allergies  latex (Other)  codeine (Unknown)  Intolerances    LABS:                        12.5   7.25  )-----------( 232      ( 23 Oct 2023 05:30 )             37.0     10-23    138  |  108  |  20  ----------------------------<  126<H>  4.1   |  22  |  0.83    Ca    8.6      23 Oct 2023 05:30  Phos  3.8     10-23  Mg     1.8     10-23    TPro  6.2  /  Alb  3.3  /  TBili  0.3  /  DBili  x   /  AST  31  /  ALT  13  /  AlkPhos  59  10-23    Urinalysis Basic - ( 23 Oct 2023 05:30 )    Color: x / Appearance: x / SG: x / pH: x  Gluc: 126 mg/dL / Ketone: x  / Bili: x / Urobili: x   Blood: x / Protein: x / Nitrite: x   Leuk Esterase: x / RBC: x / WBC x   Sq Epi: x / Non Sq Epi: x / Bacteria: x    CAPILLARY BLOOD GLUCOSE  RADIOLOGY & ADDITIONAL TESTS: Reviewed. OVERNIGHT EVENTS: NAEO    SUBJECTIVE / INTERVAL HPI: Patient seen and examined at bedside. Patient denying chest pain, SOB, palpitations, cough. Patient denies fever, chills, HA, Dizziness, N/V, abdominal pain, diarrhea, constipation, hematochezia/melena, dysuria, hematuria, new onset weakness/numbness, LE pain and/or swelling. Remaining ROS negative     PHYSICAL EXAM:  General: NAD.   HEENT: NC/AT; PERRL, anicteric sclera; MMM  Neck: supple  Cardiovascular: +S1/S2, RRR  Respiratory: CTA B/L; no W/R/R  Gastrointestinal: soft, NT/ND; +BSx4  Extremities: WWP; no edema  Vascular: 2+ radial, DP/PT pulses B/L  Neurological: AAOx3; no focal deficits  Psychiatric: pleasant mood and affect  Dermatologic: no appreciable wounds or damage to the skin    VITAL SIGNS:  Vital Signs Last 24 Hrs  T(C): 36.7 (23 Oct 2023 06:05), Max: 37.1 (22 Oct 2023 17:32)  T(F): 98 (23 Oct 2023 06:05), Max: 98.8 (22 Oct 2023 17:32)  HR: 57 (23 Oct 2023 06:05) (57 - 63)  BP: 159/74 (23 Oct 2023 06:05) (134/67 - 159/74)  BP(mean): --  RR: 18 (23 Oct 2023 06:05) (18 - 18)  SpO2: 95% (23 Oct 2023 06:05) (95% - 98%)    Parameters below as of 23 Oct 2023 06:05  Patient On (Oxygen Delivery Method): room air    MEDICATIONS:  MEDICATIONS  (STANDING):  atorvastatin 20 milliGRAM(s) Oral at bedtime  budesonide 160 MICROgram(s)/formoterol 4.5 MICROgram(s) Inhaler 2 Puff(s) Inhalation two times a day  dabigatran 150 milliGRAM(s) Oral every 12 hours  dexAMETHasone     Tablet 6 milliGRAM(s) Oral daily  influenza  Vaccine (HIGH DOSE) 0.7 milliLiter(s) IntraMuscular once  levothyroxine 75 MICROGram(s) Oral daily  metoprolol succinate ER 75 milliGRAM(s) Oral every 12 hours  pantoprazole    Tablet 40 milliGRAM(s) Oral before breakfast  remdesivir  IVPB 100 milliGRAM(s) IV Intermittent once    MEDICATIONS  (PRN):  acetaminophen     Tablet .. 650 milliGRAM(s) Oral every 6 hours PRN Temp greater or equal to 38C (100.4F), Mild Pain (1 - 3)  aluminum hydroxide/magnesium hydroxide/simethicone Suspension 30 milliLiter(s) Oral every 4 hours PRN Dyspepsia  melatonin 3 milliGRAM(s) Oral at bedtime PRN Insomnia  ondansetron Injectable 4 milliGRAM(s) IV Push every 8 hours PRN Nausea and/or Vomiting      ALLERGIES:  Allergies  latex (Other)  codeine (Unknown)  Intolerances    LABS:                        12.5   7.25  )-----------( 232      ( 23 Oct 2023 05:30 )             37.0     10-23    138  |  108  |  20  ----------------------------<  126<H>  4.1   |  22  |  0.83    Ca    8.6      23 Oct 2023 05:30  Phos  3.8     10-23  Mg     1.8     10-23    TPro  6.2  /  Alb  3.3  /  TBili  0.3  /  DBili  x   /  AST  31  /  ALT  13  /  AlkPhos  59  10-23    Urinalysis Basic - ( 23 Oct 2023 05:30 )    Color: x / Appearance: x / SG: x / pH: x  Gluc: 126 mg/dL / Ketone: x  / Bili: x / Urobili: x   Blood: x / Protein: x / Nitrite: x   Leuk Esterase: x / RBC: x / WBC x   Sq Epi: x / Non Sq Epi: x / Bacteria: x    CAPILLARY BLOOD GLUCOSE  RADIOLOGY & ADDITIONAL TESTS: Reviewed.    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 Chronic interstitial   changes. No acute infiltrates. No pneumothorax or pleural effusion. Mild   cardiomegaly. Degenerative changes thoracic spine.  No acute infiltrates. No interval change in comparison to prior   examination of the chest 10/20/2023.    XR Left Shoulder 10/20/23:  2 views of the left shoulder demonstrates no evidence of acute fracture   or dislocation. Pacemaker overlies left chest wall.    XR Lumbar Spine 10/20/23:  No acute osseous abnormalities.    XR Pelvis 10/20/23:  No evidence of acute fracture    XR Left Elbow 10/20/23:  No evidence of acute fracture    XR Left Humerus 10/20/23:  No evidence of acute fracture    CT Head/Spine 10/20/23:  Head:  1)  scattered chronic ischemic changes with volume loss. No acute   abnormality suggested  2)  no intracerebral hemorrhage, contusion, or extracerebral hemorrhagic   collections identified.  Spine  Multilevel degenerative changes. No acute fracture identified    U/S Retroperitoneal 10/16/23:  No hydronephrosis.  No nephrolithiasis.

## 2023-10-23 NOTE — PROGRESS NOTE ADULT - PROBLEM SELECTOR PLAN 2
stable  supplement O2 prn   will monitor Mechanical fall  -f/u orthostatics  -f/u EP to evaluate PPM to r/o cardiogenic syncope Presented for fall from getting out of her elevated bed on floor and was unable to fully stand upright for 11 hours. She crawled to use restroom and slept much of the time lying on the floor. Patient's adult children came to visit her and found her lying on the floor, no LOC. No acute findings on CT Head/Spine, no fractures on XR lumbar, shoulder, elbow, chest. Full ADLs at baseline. Likely mechanical fall 2/2 poor home set up. Less likely orthostatic syncope iso normal orthostatic BPs. Less likely cardiogenic syncope pending EP evaluation of PPM.  -repeat orthostatic BPs prior to discharge today  -appreciate EP team to evaluate PPM to r/o cardiogenic syncope  -physical therapy recommended home PT and worked with patient today again  -education to patient and family on how to prevent future falls

## 2023-10-23 NOTE — DISCHARGE NOTE PROVIDER - HOSPITAL COURSE
#Discharge: do not delete    Patient is __ yo M/F with past medical history of _____ presented with _____, found to have _____ (one liner)    Hospital course (by problem):     Patient was discharged to: (home/[facility]/acute rehab/hospice, etc, and with what services – home health PT/RN? Home O2?)    New medications:   Changes to old medications:  Medications that were stopped:    Items to follow up as outpatient:    Physical exam at the time of discharge:       87y Female with PMHx of hypertension, atrial fibrillation (on Pradaxa, previously intolerant of other DOACs), hypothyroid, CAD status post PCI, pulmonary sarcoidosis, asthma, TIA/stroke, and SSS status post PPM presented to ED for episode of hypertension up to  on 10/20. While speaking with her friend on the phone on 10/19 evening, pt had a  couple second episode of difficulty recalling a word "it was on the tip of my tongue." which quickly resolved. Pt states she had another episode of this today when speaking with ED provider, also quickly resolved. Stroke consulted for these episodes. Pt otherwise denies headache, visual disturbances, weakness/numbness of extremities, slurred speech, gait instability, dizziness.     Hospital course (by problem):   #COVID  Presented with asymptomatic COVID+ on admission, saturating >95% on room air. Tmax 101.7 on 10/21 despite 1 dose tylenol. Full infectious work up on 10/21: CXR no infiltrates; blood cultures NGTD, WBC 6, Lactate 1.3, procalc 0.04. Less likely superimposed bacterial infection upon Covid. 1 day of decadron given and then stopped iso never requiring oxygen. Remesdivir started on 10/20.    #Fall  Likely mechanical fall due to poor set up at home of her bunk bed. CT head no acute findings. No acute trauma or ecchymoses. Less likely orthostatic syncope iso normal orthostatic blood pressures. EP consulted to evaluate PPM to r/o cardiogenic syncope. Less likely situational syncope iso no active stressors.    #HTN  Goal SBP <180. Elevated BP last 24 hours likely secondary to 1.5 days of decadron for COVID. Continued home metoprolol.    #Hypothyroidism  Home med Synthroid 75 Qd. C/w home med.    #CAD #Atrial Fibrillation  Home med Pradaxa 150 Q12. No concern for Afib with RVR currently, HR<100. C/w home med.    Patient was discharged to: home with PT    New medications:   Changes to old medications:  Medications that were stopped:    Items to follow up as outpatient:     Physical exam at the time of discharge:  General: NAD.   HEENT: NC/AT; PERRL, anicteric sclera; MMM  Neck: supple  Cardiovascular: +S1/S2, RRR  Respiratory: CTA B/L; no W/R/R  Gastrointestinal: soft, NT/ND; +BSx4  Extremities: WWP; no edema, clubbing or cyanosis  Vascular: 2+ radial, DP/PT pulses B/L  Neurological: AAOx3; no focal deficits  Psychiatric: pleasant mood and affect  Dermatologic: no appreciable wounds or damage to the skin 87y Female with PMHx of hypertension, atrial fibrillation (on Pradaxa, previously intolerant of other DOACs), hypothyroid, CAD status post PCI, pulmonary sarcoidosis, asthma, TIA/stroke, and SSS status post PPM presented to ED for episode of hypertension up to  on 10/20. While speaking with her friend on the phone on 10/19 evening, pt had a  couple second episode of difficulty recalling a word "it was on the tip of my tongue." which quickly resolved. Pt states she had another episode of this today when speaking with ED provider, also quickly resolved. Stroke consulted for these episodes. Pt otherwise denies headache, visual disturbances, weakness/numbness of extremities, slurred speech, gait instability, dizziness.     Hospital course (by problem):   #COVID  Presented with asymptomatic COVID+ on admission, saturating >95% on room air. Tmax 101.7 on 10/21 despite 1 dose tylenol. Full infectious work up on 10/21: CXR no infiltrates; blood cultures NGTD, WBC 6, Lactate 1.3, procalc 0.04. Less likely superimposed bacterial infection upon Covid. 1 day of decadron given and then stopped iso never requiring oxygen. Remesdivir started on 10/20.    #Fall  Likely mechanical fall due to poor set up at home of her bunk bed. CT head no acute findings. No acute trauma or ecchymoses. Less likely orthostatic syncope iso normal orthostatic blood pressures. EP consulted to evaluate PPM to r/o cardiogenic syncope. Less likely situational syncope iso no active stressors.    #HTN  Goal SBP <180. Elevated BP last 24 hours likely secondary to 1.5 days of decadron for COVID. Continued home metoprolol.    #Hypothyroidism  Home med Synthroid 75 Qd. C/w home med.    #CAD #Atrial Fibrillation  Home med Pradaxa 150 Q12. No concern for Afib with RVR currently, HR<100. C/w home med.    Patient was discharged to: home with PT    New medications: none  Changes to old medications: none  Medications that were stopped: none    Medication list at discharge:  Pradaxa 150 mg every 12 hours  Remdesivir IVPB 100 mg  Flu shot  Toprol 75 mg every 12 hours  Lipitor 20 mg  Synthroid 75 micrograms daily    Items to follow up as outpatient: PCP    Physical exam at the time of discharge:  General: NAD.   HEENT: NC/AT; PERRL, anicteric sclera; MMM  Neck: supple  Cardiovascular: +S1/S2, RRR  Respiratory: CTA B/L; no W/R/R  Gastrointestinal: soft, NT/ND; +BSx4  Extremities: WWP; no edema, clubbing or cyanosis  Vascular: 2+ radial, DP/PT pulses B/L  Neurological: AAOx3; no focal deficits  Psychiatric: pleasant mood and affect  Dermatologic: no appreciable wounds or damage to the skin 87y Female with PMHx of hypertension, atrial fibrillation (on Pradaxa, previously intolerant of other DOACs), hypothyroid, CAD status post PCI, pulmonary sarcoidosis, asthma, TIA/stroke, and SSS status post PPM presented to ED for episode of hypertension up to  on 10/20. While speaking with her friend on the phone on 10/19 evening, pt had a  couple second episode of difficulty recalling a word "it was on the tip of my tongue." which quickly resolved. Pt states she had another episode of this today when speaking with ED provider, also quickly resolved. Stroke consulted for these episodes. Pt otherwise denies headache, visual disturbances, weakness/numbness of extremities, slurred speech, gait instability, dizziness.     Hospital course (by problem):   #COVID  Presented with asymptomatic COVID+ on admission, saturating >95% on room air. Tmax 101.7 on 10/21 despite 1 dose tylenol. Full infectious work up on 10/21: CXR no infiltrates; blood cultures NGTD, WBC 6, Lactate 1.3, procalc 0.04. Less likely superimposed bacterial infection upon Covid. 1 day of decadron given and then stopped iso never requiring oxygen. Remesdivir started on 10/20.    #Fall  Likely mechanical fall due to poor set up at home of her bunk bed. CT head no acute findings. No acute trauma or ecchymoses. Less likely orthostatic syncope iso normal orthostatic blood pressures. EP consulted to evaluate PPM to r/o cardiogenic syncope. Less likely situational syncope iso no active stressors.    #HTN  Goal SBP <180. Elevated BP last 24 hours likely secondary to 1.5 days of decadron for COVID. Continued home metoprolol.    #Hypothyroidism  Home med Synthroid 75 Qd. C/w home med.    #CAD #Atrial Fibrillation  Home med Pradaxa 150 Q12. No concern for Afib with RVR currently, HR<100. C/w home med.    Patient was discharged to: home with PT    New medications: none  Changes to old medications: none  Medications that were stopped: none    Medication list at discharge:  Pradaxa 150 mg every 12 hours  Flu shot  Toprol 75 mg every 12 hours  Lipitor 20 mg  Synthroid 75 micrograms daily    Items to follow up as outpatient: PCP    Physical exam at the time of discharge:  General: NAD.   HEENT: NC/AT; PERRL, anicteric sclera; MMM  Neck: supple  Cardiovascular: +S1/S2, RRR  Respiratory: CTA B/L; no W/R/R  Gastrointestinal: soft, NT/ND; +BSx4  Extremities: WWP; no edema, clubbing or cyanosis  Vascular: 2+ radial, DP/PT pulses B/L  Neurological: AAOx3; no focal deficits  Psychiatric: pleasant mood and affect  Dermatologic: no appreciable wounds or damage to the skin 87y female with PMHx of hypertension, atrial fibrillation (on Pradaxa, previously intolerant of other DOACs), hypothyroid on Synthyroid, CAD status post PCI, pulmonary sarcoidosis, asthma, TIA/stroke history, and SSS status post PPM presenting to ED for mechanical fall for 11 hours when getting out of bed, admitted for trauma survey work up, physical therapy evaluation, and COVID+ infection, who remains hemodynamically stable..     #COVID #Pulmonary Sarcoidosis  Presented with asymptomatic COVID+ on admission, saturating >95% on room air. Afebrile at admission, but developed Tmax 101.7 on 10/21 despite 1 dose Tylenol. Full infectious work up on 10/21 showed CXR no infiltrates; blood cultures NGTD, WBC 6, Lactate 1.3, procalcitonin 0.04. Less likely superimposed bacterial infection upon Covid. 1 day of decadron given only as not indicated. Remdesivir started on 10/20Yearly influenza vaccine administered. Should follow up outpatient PCP in two weeks, return earlier if symptoms worsen. COVID isolation precautions for 10 days after testing positive.    #Fall   Presented for fall from getting out of her elevated bed on floor and was unable to fully stand upright for 11 hours. She crawled to use restroom and slept much of the time lying on the floor. Patient's adult children came to visit her and found her lying on the floor, no LOC. No acute findings on CT Head/Spine, no fractures on XR lumbar, shoulder, elbow, chest. Full ADLs at baseline. Likely mechanical fall 2/2 poor home set up. Less likely orthostatic syncope iso normal orthostatic BPs. Less likely cardiogenic syncope as EP evaluation of PPM found no deficits.     #History of TIA  History of TIA in past, no residual deficits. Prior to admission, when speaking with her friend on the phone patient had couple second episode of difficulty recalling a word that "it was on the tip of my tongue." which quickly resolved. Pt had another episode when speaking with ED provider, also quickly resolved. Stroke consulted for these episodes. Pt otherwise denies headache, visual disturbances, weakness/numbness of extremities, slurred speech, gait instability, dizziness. Low concern for stroke per stroke team.    #HTN  Goal SBP <180. Elevated BP last 24 hours likely secondary to 1.5 days of decadron for COVID. Continued home metoprolol 75 mg q12..    #Hypothyroidism  Home med Synthroid 75 Qd. C/w home med.    #CAD #Atrial Fibrillation  Home med Pradaxa 150 Q12 (previously intolerant of other DOACs). No concern for Afib with RVR currently, HR<100. C/w home med.    Patient was discharged to: home with PT  New medications: none. Changes to old medications: none Medications that were stopped: none  Items to follow up as outpatient: PCP, EP    Physical exam at the time of discharge:  General: NAD.   HEENT: NC/AT; PERRL, anicteric sclera; MMM  Neck: supple  Cardiovascular: +S1/S2, RRR  Respiratory: CTA B/L; no W/R/R  Gastrointestinal: soft, NT/ND; +BSx4  Extremities: WWP; no edema, clubbing or cyanosis  Vascular: 2+ radial, DP/PT pulses B/L  Neurological: AAOx3; no focal deficits  Psychiatric: pleasant mood and affect  Dermatologic: no appreciable wounds or damage to the skin    Recent Imaging:  CXR 10/21/23:  Stable positioning of left chest wall pacemaker.. Chronic interstitial   changes. No acute infiltrates. No pneumothorax or pleural effusion. Mild   cardiomegaly. Degenerative changes thoracic spine.  No acute infiltrates. No interval change in comparison to prior   examination of the chest 10/20/2023.    XR Left Shoulder 10/20/23:  2 views of the left shoulder demonstrates no evidence of acute fracture   or dislocation. Pacemaker overlies left chest wall.    XR Lumbar Spine 10/20/23:  No acute osseous abnormalities.    XR Pelvis 10/20/23:  No evidence of acute fracture    XR Left Elbow 10/20/23:  No evidence of acute fracture    XR Left Humerus 10/20/23:  No evidence of acute fracture    CT Head/Spine 10/20/23:  Head:  1)  scattered chronic ischemic changes with volume loss. No acute   abnormality suggested  2)  no intracerebral hemorrhage, contusion, or extracerebral hemorrhagic   collections identified.  Spine  Multilevel degenerative changes. No acute fracture identified    U/S Retroperitoneal 10/16/23:  No hydronephrosis.  No nephrolithiasis.    Electrophysiology Pacemaker Evaluation 10/23/23:  Device model: 	Medtronic dual-chamber ppm 			                          Functioning Mode: DDD		  Underlying Rhythm:  Sinus bradycardia  Pacemaker dependency: AP: 82.4%, : <0.1%  Battery status: 13.6 years   Interrogating parameters:   			RA			RV			  Sense:                                 2.5 mV                               7.8 mV                         Threshold:                             0.750 V @ 0.40 ms            0.875 V @ 0.40 ms                                                                                        Pacing Impedance:                  380 ohms                          494 ohm                                                                                                                                                                         Events/Alert:  1 episode of NSVT on 10/01/2023, terminated on its on.   Parameter change: None    Assessment: Normal functioning ppm. No sinus bradycardia, arrhythmias or pauses that would have lead to patient's episode of fall. Pacemaker site examined - stable, without excessive ecchymosis, erythremia or hematoma

## 2023-10-24 ENCOUNTER — APPOINTMENT (OUTPATIENT)
Dept: PULMONOLOGY | Facility: CLINIC | Age: 88
End: 2023-10-24

## 2023-10-25 LAB
CULTURE RESULTS: SIGNIFICANT CHANGE UP
SPECIMEN SOURCE: SIGNIFICANT CHANGE UP

## 2023-10-30 ENCOUNTER — APPOINTMENT (OUTPATIENT)
Dept: HOME HEALTH SERVICES | Facility: HOME HEALTH | Age: 88
End: 2023-10-30
Payer: MEDICARE

## 2023-10-30 VITALS
DIASTOLIC BLOOD PRESSURE: 72 MMHG | HEIGHT: 60 IN | HEART RATE: 72 BPM | RESPIRATION RATE: 22 BRPM | TEMPERATURE: 98.9 F | OXYGEN SATURATION: 98 % | SYSTOLIC BLOOD PRESSURE: 138 MMHG

## 2023-10-30 DIAGNOSIS — Z23 ENCOUNTER FOR IMMUNIZATION: ICD-10-CM

## 2023-10-30 DIAGNOSIS — Z91.81 HISTORY OF FALLING: ICD-10-CM

## 2023-10-30 DIAGNOSIS — U07.1 COVID-19: ICD-10-CM

## 2023-10-30 DIAGNOSIS — M19.90 UNSPECIFIED OSTEOARTHRITIS, UNSPECIFIED SITE: ICD-10-CM

## 2023-10-30 PROCEDURE — 99497 ADVNCD CARE PLAN 30 MIN: CPT

## 2023-10-30 PROCEDURE — 99344 HOME/RES VST NEW MOD MDM 60: CPT

## 2023-10-30 RX ORDER — CIPROFLOXACIN AND DEXAMETHASONE 3; 1 MG/ML; MG/ML
0.3-0.1 SUSPENSION/ DROPS AURICULAR (OTIC) TWICE DAILY
Qty: 1 | Refills: 1 | Status: DISCONTINUED | COMMUNITY
Start: 2018-09-27 | End: 2023-10-30

## 2023-10-31 DIAGNOSIS — U07.1 COVID-19: ICD-10-CM

## 2023-10-31 DIAGNOSIS — I48.20 CHRONIC ATRIAL FIBRILLATION, UNSPECIFIED: ICD-10-CM

## 2023-10-31 DIAGNOSIS — Z79.01 LONG TERM (CURRENT) USE OF ANTICOAGULANTS: ICD-10-CM
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
## 2023-10-31 DIAGNOSIS — I10 ESSENTIAL (PRIMARY) HYPERTENSION: ICD-10-CM

## 2023-10-31 DIAGNOSIS — Z95.0 PRESENCE OF CARDIAC PACEMAKER: ICD-10-CM

## 2023-10-31 DIAGNOSIS — D86.0 SARCOIDOSIS OF LUNG: ICD-10-CM

## 2023-10-31 DIAGNOSIS — M25.512 PAIN IN LEFT SHOULDER: ICD-10-CM

## 2023-10-31 DIAGNOSIS — M62.82 RHABDOMYOLYSIS: ICD-10-CM
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
## 2023-10-31 DIAGNOSIS — E03.9 HYPOTHYROIDISM, UNSPECIFIED: ICD-10-CM

## 2023-10-31 DIAGNOSIS — R51.9 HEADACHE, UNSPECIFIED: ICD-10-CM

## 2023-10-31 DIAGNOSIS — Z88.5 ALLERGY STATUS TO NARCOTIC AGENT: ICD-10-CM

## 2023-10-31 DIAGNOSIS — Z91.040 LATEX ALLERGY STATUS: ICD-10-CM

## 2023-10-31 DIAGNOSIS — I49.5 SICK SINUS SYNDROME: ICD-10-CM

## 2023-10-31 DIAGNOSIS — Z86.73 PERSONAL HISTORY OF TRANSIENT ISCHEMIC ATTACK (TIA), AND CEREBRAL INFARCTION WITHOUT RESIDUAL DEFICITS: ICD-10-CM

## 2023-11-06 NOTE — ED PROVIDER NOTE - PCP FREE TEXT FOR MDM DISCUSSED CASE WITH QUESTION
dr isabel Drysol Pregnancy And Lactation Text: This medication is considered safe during pregnancy and breast feeding.

## 2023-11-09 ENCOUNTER — RESULT REVIEW (OUTPATIENT)
Age: 88
End: 2023-11-09

## 2023-11-09 ENCOUNTER — APPOINTMENT (OUTPATIENT)
Dept: ORTHOPEDIC SURGERY | Facility: CLINIC | Age: 88
End: 2023-11-09
Payer: MEDICARE

## 2023-11-09 ENCOUNTER — OUTPATIENT (OUTPATIENT)
Dept: OUTPATIENT SERVICES | Facility: HOSPITAL | Age: 88
LOS: 1 days | End: 2023-11-09
Payer: MEDICARE

## 2023-11-09 VITALS
WEIGHT: 139 LBS | SYSTOLIC BLOOD PRESSURE: 138 MMHG | HEIGHT: 60 IN | OXYGEN SATURATION: 96 % | HEART RATE: 64 BPM | BODY MASS INDEX: 27.29 KG/M2 | TEMPERATURE: 97.6 F | DIASTOLIC BLOOD PRESSURE: 75 MMHG

## 2023-11-09 DIAGNOSIS — Z60.2 PROBLEMS RELATED TO LIVING ALONE: ICD-10-CM

## 2023-11-09 DIAGNOSIS — Z80.0 FAMILY HISTORY OF MALIGNANT NEOPLASM OF DIGESTIVE ORGANS: ICD-10-CM

## 2023-11-09 PROCEDURE — 73030 X-RAY EXAM OF SHOULDER: CPT | Mod: 26,RT

## 2023-11-09 PROCEDURE — 99203 OFFICE O/P NEW LOW 30 MIN: CPT

## 2023-11-09 PROCEDURE — 73030 X-RAY EXAM OF SHOULDER: CPT

## 2023-11-09 PROCEDURE — 73030 X-RAY EXAM OF SHOULDER: CPT | Mod: RT

## 2023-11-09 RX ORDER — DICLOFENAC SODIUM 1% 10 MG/G
1 GEL TOPICAL
Qty: 100 | Refills: 0 | Status: ACTIVE | COMMUNITY
Start: 2023-11-09 | End: 1900-01-01

## 2023-11-09 SDOH — SOCIAL STABILITY - SOCIAL INSECURITY: PROBLEMS RELATED TO LIVING ALONE: Z60.2

## 2023-11-13 PROCEDURE — 83605 ASSAY OF LACTIC ACID: CPT

## 2023-11-13 PROCEDURE — 80053 COMPREHEN METABOLIC PANEL: CPT

## 2023-11-13 PROCEDURE — 72125 CT NECK SPINE W/O DYE: CPT | Mod: MA

## 2023-11-13 PROCEDURE — 85610 PROTHROMBIN TIME: CPT

## 2023-11-13 PROCEDURE — 87040 BLOOD CULTURE FOR BACTERIA: CPT

## 2023-11-13 PROCEDURE — 84145 PROCALCITONIN (PCT): CPT

## 2023-11-13 PROCEDURE — 36415 COLL VENOUS BLD VENIPUNCTURE: CPT

## 2023-11-13 PROCEDURE — 82550 ASSAY OF CK (CPK): CPT

## 2023-11-13 PROCEDURE — 97161 PT EVAL LOW COMPLEX 20 MIN: CPT

## 2023-11-13 PROCEDURE — 73030 X-RAY EXAM OF SHOULDER: CPT

## 2023-11-13 PROCEDURE — 84436 ASSAY OF TOTAL THYROXINE: CPT

## 2023-11-13 PROCEDURE — 72170 X-RAY EXAM OF PELVIS: CPT

## 2023-11-13 PROCEDURE — 85027 COMPLETE CBC AUTOMATED: CPT

## 2023-11-13 PROCEDURE — 73060 X-RAY EXAM OF HUMERUS: CPT

## 2023-11-13 PROCEDURE — 73080 X-RAY EXAM OF ELBOW: CPT

## 2023-11-13 PROCEDURE — G0378: CPT

## 2023-11-13 PROCEDURE — 83735 ASSAY OF MAGNESIUM: CPT

## 2023-11-13 PROCEDURE — 96365 THER/PROPH/DIAG IV INF INIT: CPT

## 2023-11-13 PROCEDURE — 99285 EMERGENCY DEPT VISIT HI MDM: CPT

## 2023-11-13 PROCEDURE — 96366 THER/PROPH/DIAG IV INF ADDON: CPT

## 2023-11-13 PROCEDURE — 71045 X-RAY EXAM CHEST 1 VIEW: CPT

## 2023-11-13 PROCEDURE — 84443 ASSAY THYROID STIM HORMONE: CPT

## 2023-11-13 PROCEDURE — 96368 THER/DIAG CONCURRENT INF: CPT

## 2023-11-13 PROCEDURE — 72100 X-RAY EXAM L-S SPINE 2/3 VWS: CPT

## 2023-11-13 PROCEDURE — 81003 URINALYSIS AUTO W/O SCOPE: CPT

## 2023-11-13 PROCEDURE — 85730 THROMBOPLASTIN TIME PARTIAL: CPT

## 2023-11-13 PROCEDURE — 94640 AIRWAY INHALATION TREATMENT: CPT

## 2023-11-13 PROCEDURE — 70450 CT HEAD/BRAIN W/O DYE: CPT | Mod: MA

## 2023-11-13 PROCEDURE — 87637 SARSCOV2&INF A&B&RSV AMP PRB: CPT

## 2023-11-13 PROCEDURE — 85025 COMPLETE CBC W/AUTO DIFF WBC: CPT

## 2023-11-13 PROCEDURE — 84100 ASSAY OF PHOSPHORUS: CPT

## 2023-11-17 ENCOUNTER — APPOINTMENT (OUTPATIENT)
Dept: HEART AND VASCULAR | Facility: CLINIC | Age: 88
End: 2023-11-17
Payer: MEDICARE

## 2023-11-17 VITALS
HEIGHT: 60 IN | BODY MASS INDEX: 27.88 KG/M2 | HEART RATE: 78 BPM | OXYGEN SATURATION: 97 % | TEMPERATURE: 97.6 F | WEIGHT: 142 LBS

## 2023-11-17 VITALS — DIASTOLIC BLOOD PRESSURE: 65 MMHG | SYSTOLIC BLOOD PRESSURE: 140 MMHG

## 2023-11-17 PROCEDURE — 99214 OFFICE O/P EST MOD 30 MIN: CPT | Mod: 24

## 2023-11-17 RX ORDER — DABIGATRAN ETEXILATE 150 MG/1
150 CAPSULE ORAL
Qty: 180 | Refills: 3 | Status: ACTIVE | COMMUNITY
Start: 2022-07-26 | End: 1900-01-01

## 2024-01-02 ENCOUNTER — APPOINTMENT (OUTPATIENT)
Dept: HEART AND VASCULAR | Facility: CLINIC | Age: 89
End: 2024-01-02
Payer: MEDICARE

## 2024-01-02 ENCOUNTER — NON-APPOINTMENT (OUTPATIENT)
Age: 89
End: 2024-01-02

## 2024-01-02 PROCEDURE — 93296 REM INTERROG EVL PM/IDS: CPT

## 2024-01-02 PROCEDURE — 93294 REM INTERROG EVL PM/LDLS PM: CPT

## 2024-01-31 RX ORDER — BUDESONIDE AND FORMOTEROL FUMARATE DIHYDRATE 160; 4.5 UG/1; UG/1
160-4.5 AEROSOL RESPIRATORY (INHALATION) TWICE DAILY
Qty: 3 | Refills: 0 | Status: ACTIVE | COMMUNITY
Start: 2021-05-10 | End: 1900-01-01

## 2024-02-02 ENCOUNTER — TRANSCRIPTION ENCOUNTER (OUTPATIENT)
Age: 89
End: 2024-02-02

## 2024-02-02 ENCOUNTER — APPOINTMENT (OUTPATIENT)
Dept: HEART AND VASCULAR | Facility: CLINIC | Age: 89
End: 2024-02-02
Payer: MEDICARE

## 2024-02-02 ENCOUNTER — INPATIENT (INPATIENT)
Facility: HOSPITAL | Age: 89
LOS: 0 days | Discharge: HOME | DRG: 310 | End: 2024-02-02
Attending: INTERNAL MEDICINE | Admitting: INTERNAL MEDICINE
Payer: MEDICARE

## 2024-02-02 ENCOUNTER — NON-APPOINTMENT (OUTPATIENT)
Age: 89
End: 2024-02-02

## 2024-02-02 VITALS
OXYGEN SATURATION: 98 % | DIASTOLIC BLOOD PRESSURE: 65 MMHG | HEART RATE: 65 BPM | RESPIRATION RATE: 20 BRPM | SYSTOLIC BLOOD PRESSURE: 147 MMHG

## 2024-02-02 VITALS
HEART RATE: 164 BPM | SYSTOLIC BLOOD PRESSURE: 133 MMHG | OXYGEN SATURATION: 99 % | RESPIRATION RATE: 18 BRPM | WEIGHT: 145.06 LBS | TEMPERATURE: 97 F | DIASTOLIC BLOOD PRESSURE: 79 MMHG

## 2024-02-02 VITALS — HEIGHT: 60 IN | WEIGHT: 144 LBS | OXYGEN SATURATION: 97 % | TEMPERATURE: 97.6 F | BODY MASS INDEX: 28.27 KG/M2

## 2024-02-02 LAB
ALBUMIN SERPL ELPH-MCNC: 4.2 G/DL — SIGNIFICANT CHANGE UP (ref 3.3–5)
ALP SERPL-CCNC: 91 U/L — SIGNIFICANT CHANGE UP (ref 40–120)
ALT FLD-CCNC: 9 U/L — LOW (ref 10–45)
ANION GAP SERPL CALC-SCNC: 14 MMOL/L — SIGNIFICANT CHANGE UP (ref 5–17)
APTT BLD: 88.6 SEC — HIGH (ref 24.5–35.6)
AST SERPL-CCNC: 18 U/L — SIGNIFICANT CHANGE UP (ref 10–40)
BASOPHILS # BLD AUTO: 0.07 K/UL — SIGNIFICANT CHANGE UP (ref 0–0.2)
BASOPHILS NFR BLD AUTO: 0.7 % — SIGNIFICANT CHANGE UP (ref 0–2)
BILIRUB SERPL-MCNC: 0.6 MG/DL — SIGNIFICANT CHANGE UP (ref 0.2–1.2)
BLD GP AB SCN SERPL QL: NEGATIVE — SIGNIFICANT CHANGE UP
BUN SERPL-MCNC: 20 MG/DL — SIGNIFICANT CHANGE UP (ref 7–23)
CALCIUM SERPL-MCNC: 10 MG/DL — SIGNIFICANT CHANGE UP (ref 8.4–10.5)
CHLORIDE SERPL-SCNC: 103 MMOL/L — SIGNIFICANT CHANGE UP (ref 96–108)
CO2 SERPL-SCNC: 24 MMOL/L — SIGNIFICANT CHANGE UP (ref 22–31)
CREAT SERPL-MCNC: 1.25 MG/DL — SIGNIFICANT CHANGE UP (ref 0.5–1.3)
EGFR: 41 ML/MIN/1.73M2 — LOW
EOSINOPHIL # BLD AUTO: 0.08 K/UL — SIGNIFICANT CHANGE UP (ref 0–0.5)
EOSINOPHIL NFR BLD AUTO: 0.8 % — SIGNIFICANT CHANGE UP (ref 0–6)
GLUCOSE SERPL-MCNC: 102 MG/DL — HIGH (ref 70–99)
HCT VFR BLD CALC: 48.3 % — HIGH (ref 34.5–45)
HGB BLD-MCNC: 15.6 G/DL — HIGH (ref 11.5–15.5)
IMM GRANULOCYTES NFR BLD AUTO: 0.5 % — SIGNIFICANT CHANGE UP (ref 0–0.9)
INR BLD: 1.52 — HIGH (ref 0.85–1.18)
LYMPHOCYTES # BLD AUTO: 2.51 K/UL — SIGNIFICANT CHANGE UP (ref 1–3.3)
LYMPHOCYTES # BLD AUTO: 24.9 % — SIGNIFICANT CHANGE UP (ref 13–44)
MCHC RBC-ENTMCNC: 30.5 PG — SIGNIFICANT CHANGE UP (ref 27–34)
MCHC RBC-ENTMCNC: 32.3 GM/DL — SIGNIFICANT CHANGE UP (ref 32–36)
MCV RBC AUTO: 94.3 FL — SIGNIFICANT CHANGE UP (ref 80–100)
MONOCYTES # BLD AUTO: 0.94 K/UL — HIGH (ref 0–0.9)
MONOCYTES NFR BLD AUTO: 9.3 % — SIGNIFICANT CHANGE UP (ref 2–14)
NEUTROPHILS # BLD AUTO: 6.42 K/UL — SIGNIFICANT CHANGE UP (ref 1.8–7.4)
NEUTROPHILS NFR BLD AUTO: 63.8 % — SIGNIFICANT CHANGE UP (ref 43–77)
NRBC # BLD: 0 /100 WBCS — SIGNIFICANT CHANGE UP (ref 0–0)
PLATELET # BLD AUTO: 301 K/UL — SIGNIFICANT CHANGE UP (ref 150–400)
POTASSIUM SERPL-MCNC: 3.7 MMOL/L — SIGNIFICANT CHANGE UP (ref 3.5–5.3)
POTASSIUM SERPL-SCNC: 3.7 MMOL/L — SIGNIFICANT CHANGE UP (ref 3.5–5.3)
PROT SERPL-MCNC: 7.5 G/DL — SIGNIFICANT CHANGE UP (ref 6–8.3)
PROTHROM AB SERPL-ACNC: 17.1 SEC — HIGH (ref 9.5–13)
RBC # BLD: 5.12 M/UL — SIGNIFICANT CHANGE UP (ref 3.8–5.2)
RBC # FLD: 13.2 % — SIGNIFICANT CHANGE UP (ref 10.3–14.5)
RH IG SCN BLD-IMP: POSITIVE — SIGNIFICANT CHANGE UP
SODIUM SERPL-SCNC: 141 MMOL/L — SIGNIFICANT CHANGE UP (ref 135–145)
TROPONIN T, HIGH SENSITIVITY RESULT: 26 NG/L — SIGNIFICANT CHANGE UP (ref 0–51)
WBC # BLD: 10.07 K/UL — SIGNIFICANT CHANGE UP (ref 3.8–10.5)
WBC # FLD AUTO: 10.07 K/UL — SIGNIFICANT CHANGE UP (ref 3.8–10.5)

## 2024-02-02 PROCEDURE — 86850 RBC ANTIBODY SCREEN: CPT

## 2024-02-02 PROCEDURE — 93000 ELECTROCARDIOGRAM COMPLETE: CPT | Mod: 59

## 2024-02-02 PROCEDURE — 99214 OFFICE O/P EST MOD 30 MIN: CPT | Mod: 25

## 2024-02-02 PROCEDURE — 80053 COMPREHEN METABOLIC PANEL: CPT

## 2024-02-02 PROCEDURE — 99285 EMERGENCY DEPT VISIT HI MDM: CPT

## 2024-02-02 PROCEDURE — 84484 ASSAY OF TROPONIN QUANT: CPT

## 2024-02-02 PROCEDURE — 85610 PROTHROMBIN TIME: CPT

## 2024-02-02 PROCEDURE — 71045 X-RAY EXAM CHEST 1 VIEW: CPT

## 2024-02-02 PROCEDURE — 71045 X-RAY EXAM CHEST 1 VIEW: CPT | Mod: 26

## 2024-02-02 PROCEDURE — 86900 BLOOD TYPING SEROLOGIC ABO: CPT

## 2024-02-02 PROCEDURE — 85730 THROMBOPLASTIN TIME PARTIAL: CPT

## 2024-02-02 PROCEDURE — 92960 CARDIOVERSION ELECTRIC EXT: CPT

## 2024-02-02 PROCEDURE — 86901 BLOOD TYPING SEROLOGIC RH(D): CPT

## 2024-02-02 PROCEDURE — 85025 COMPLETE CBC W/AUTO DIFF WBC: CPT

## 2024-02-02 PROCEDURE — 93280 PM DEVICE PROGR EVAL DUAL: CPT | Mod: 26

## 2024-02-02 PROCEDURE — 36415 COLL VENOUS BLD VENIPUNCTURE: CPT

## 2024-02-02 PROCEDURE — 99223 1ST HOSP IP/OBS HIGH 75: CPT | Mod: 25

## 2024-02-02 RX ORDER — LEVOTHYROXINE SODIUM 125 MCG
1 TABLET ORAL
Qty: 0 | Refills: 0 | DISCHARGE

## 2024-02-02 RX ORDER — AMIODARONE HYDROCHLORIDE 400 MG/1
2 TABLET ORAL
Qty: 90 | Refills: 1
Start: 2024-02-02 | End: 2024-02-15

## 2024-02-02 RX ORDER — FUROSEMIDE 40 MG
1 TABLET ORAL
Qty: 0 | Refills: 0 | DISCHARGE

## 2024-02-02 RX ORDER — BUDESONIDE AND FORMOTEROL FUMARATE DIHYDRATE 160; 4.5 UG/1; UG/1
2 AEROSOL RESPIRATORY (INHALATION)
Qty: 0 | Refills: 0 | DISCHARGE

## 2024-02-02 RX ORDER — METOPROLOL TARTRATE 50 MG
1.5 TABLET ORAL
Refills: 0 | DISCHARGE

## 2024-02-02 RX ORDER — AMIODARONE HYDROCHLORIDE 400 MG/1
400 TABLET ORAL
Refills: 0 | Status: DISCONTINUED | OUTPATIENT
Start: 2024-02-02 | End: 2024-02-02

## 2024-02-02 RX ORDER — SODIUM CHLORIDE 9 MG/ML
500 INJECTION INTRAMUSCULAR; INTRAVENOUS; SUBCUTANEOUS ONCE
Refills: 0 | Status: COMPLETED | OUTPATIENT
Start: 2024-02-02 | End: 2024-02-02

## 2024-02-02 RX ADMIN — AMIODARONE HYDROCHLORIDE 400 MILLIGRAM(S): 400 TABLET ORAL at 14:35

## 2024-02-02 RX ADMIN — SODIUM CHLORIDE 250 MILLILITER(S): 9 INJECTION INTRAMUSCULAR; INTRAVENOUS; SUBCUTANEOUS at 13:33

## 2024-02-02 NOTE — DISCHARGE NOTE NURSING/CASE MANAGEMENT/SOCIAL WORK - NSDCPEFALRISK_GEN_ALL_CORE
For information on Fall & Injury Prevention, visit: [email or web address] OR  [email or web address] OR  https://www.cdc.gov/steadi/patient.html

## 2024-02-02 NOTE — ED ADULT TRIAGE NOTE - GLASGOW COMA SCALE: EYE OPENING, MLM
Pt bib mom with c/o vomiting and fever x 2 days.  Tylenol given PTA. Mom reported that baby is not eating   and drinking as he usually does. Vomits after drinking milk.
(E4) spontaneous

## 2024-02-02 NOTE — DISCHARGE NOTE PROVIDER - NSDCCPCAREPLAN_GEN_ALL_CORE_FT
PRINCIPAL DISCHARGE DIAGNOSIS  Diagnosis: Atrial fibrillation  Assessment and Plan of Treatment:      PRINCIPAL DISCHARGE DIAGNOSIS  Diagnosis: Atrial fibrillation  Assessment and Plan of Treatment: You were cardioverted from atrial fibrillation (an irregular heart rhythm) here in the hospital. With this condition, the hearts 2 upper chambers (the atria) quiver rather than squeeze the blood out in a normal pattern. This leads to an irregular and sometimes rapid heartbeat. Atrial fibrillation is serious condition as it affects the heart’s ability to fill with and pump blood, so the blood can start to form clots. These clots can travel to the brain through the blood vessels, causing a strokes, or to other parts of the body.   -Please CONTINUE  Pradaxa/Dabigatran  TWICE A DAY, a blood thinner, to prevent strokes by helping to prevent clots from forming.  Do not stop taking PRADAXA / Dabigatran without talking to your cardiologist.   -Please CONTINUE METOPROLOL TWICE A DAY to keep your heart rate regular  -Please CONTINUE AMIODARONE AS DIRECTED to keep your heart in normal rhythm  -Please follow up with Dr. Garcia within 1-2 weeks.

## 2024-02-02 NOTE — HISTORY OF PRESENT ILLNESS
[FreeTextEntry1] : 88-year-old female with SSS s/p PPM, HTN, Afib on Pradaxa, CAD (hx of stents), pulmonary sarcoidosis, asthma, and TIA/CVA is here for f/u.   Pt sees neurology (Dr. Rick) and Pulm (Dr. Riggs).   2/2/24: Chief complaint today is progressive fatigue which began after she came back from [place] on Jan 1. Since then, she has noted worsening HUANG and lightheadedness, leading to unsteadiness while walking (walks with cane). Denies CP, orthopnea, PND, leg swelling. She saw her PCP on 1/29 and was told she had a fast HR but was sent home / did not want to go to the ER.   11/17/23: Pt is s/p PPM generator change on 9/7 and then saw EP on 9/27 and was started on HCTZ. Feels well since then, states that she is back to her baseline in terms of her breathing. Repeat BNP was 683 (from >4k). She now needs a new pulmonologist since Dr. Riggs is leaving. Is going to [place] for the next 6 months.  9/5/23: Pt has been feeling more SOB over the past few mos. No CP. She was started on lasix by Dr. Riggs on 8/24/23 but it hasn't helped as much as she had hoped. No CP. BNP has been going up (last one was 6072) PPM interrogation today by Dr. Garcia revealed battery - end of life; VVI pacing   4/4/23: No complaints. BP has been well controlled at home.   1/31/23: No cardiac complaints. BP has been much lower and within normal limits at home.   10/25/22: Pt complained of one episode of substernal chest pain in the last two weeks.It lasted about 5 mi, was pressure-like and uncomfortable. BP OK today and Metoprolol was increased today too 75 mg bid by EP to improve Afib rate control based on interrogation of device) as patient does not feel well during the episodes when the rate increases.  She feels a headache and chills when she has afib with RVR.   No shortness of breath. No dyspnea on exertion. No orthopnea. No PND.   _______________________ Latest labs from 10/25/22: LDL 98 K 4.5  Cr 1.25 LFTs wnl A1c 5.9

## 2024-02-02 NOTE — DISCHARGE NOTE PROVIDER - CARE PROVIDER_API CALL
Rigo Garcia  Cardiac Electrophysiology  [address], [address] [place] [address]  Phone: [phone]  Fax: [phone]  Follow Up Time:

## 2024-02-02 NOTE — DISCHARGE NOTE PROVIDER - NSDCMRMEDTOKEN_GEN_ALL_CORE_FT
amiodarone 200 mg oral tablet: 2 tab(s) orally 2 times a day take 400mg (2 tab) twice daily x7 days (until 2/9), then take 200mg oral once daily thereafter  budesonide-formoterol 160 mcg-4.5 mcg/inh inhalation aerosol: 2 puff(s) inhaled 2 times a day  dabigatran 150 mg oral capsule: 1 cap(s) orally every 12 hours  levothyroxine 75 mcg (0.075 mg) oral capsule: 1 capsule orally once a day  Metoprolol Succinate ER 50 mg oral tablet, extended release: 1.5 tab(s) orally 2 times a day  pravastatin 80 mg oral tablet: 1 tab(s) orally once a day   amiodarone 200 mg oral tablet: 2 tab(s) orally 2 times a day take 400mg (2 tab) twice daily x7 days (until 2/9), then take 200mg oral once daily thereafter  budesonide-formoterol 160 mcg-4.5 mcg/inh inhalation aerosol: 2 puff(s) inhaled 2 times a day  dabigatran 150 mg oral capsule: 1 cap(s) orally every 12 hours  Lasix 20 mg oral tablet: 1 tab(s) orally once a day  levothyroxine 75 mcg (0.075 mg) oral capsule: 1 capsule orally once a day  Metoprolol Succinate ER 50 mg oral tablet, extended release: 1.5 tab(s) orally 2 times a day  pravastatin 80 mg oral tablet: 1 tab(s) orally once a day

## 2024-02-02 NOTE — H&P ADULT - HISTORY OF PRESENT ILLNESS
HPI:  88 year old female with HTN, CAD s/p PCI, TIA/CVA, sick sinus syndrome s/p Medtronic dual chamber pacemaker with recent generator change 09/2023, known pAF, who presented to her outpatient cardiologist today noted to be in AF with RVR, referred to the ER and EP consulted.     Device interrogation reveals worsening AFib burden since last interrogation with persistent AF since 1/30. Pt has rapid rates while in AF max rate 200s, currently around 160s.    Pt on pradaxa for AC and is compliant with no missed doses.    Pt denies symptoms initially but after extensive discussion does occasionally have some lightheadedness and palpitations especially with exertion.          PAST MEDICAL & SURGICAL HISTORY:  Diverticulosis of large intestine  Sarcoidosis  Essential hypertension  Transient cerebral ischemia  Atherosclerosis of coronary artery  Cataract  Status post percutaneous transluminal coronary angioplasty  s/p 2012  Presence of cardiac pacemaker  s/p 2011    Family history of diabetes mellitus        Social History:no smoking, no drugs, no algohol    pertinent home medications:    Inpatient Medications:   aMIOdarone    Tablet 40 milliGRAM(s) Oral two times a day      Allergies: latex (Other)  codeine (Unknown)      ROS:   CONSTITUTIONAL: No fever, weight loss + fatigue  EYES: Pt denies  RESPIRATORY: No cough, wheezing, chills or hemoptysis; No Shortness of Breath  CARDIOVASCULAR: see HPI  GASTROINTESTINAL: Pt denies  NEUROLOGICAL: Pt denies  SKIN: Pt denies   PSYCHIATRIC: Pt denies  HEME/LYMPH: Pt denies    PHYSICAL:  T(C): 36.3 (02-02-24 @ 12:45), Max: 36.3 (02-02-24 @ 12:45)  HR: 121 (02-02-24 @ 13:50) (121 - 164)  BP: 136/74 (02-02-24 @ 13:50) (133/79 - 136/74)  RR: 18 (02-02-24 @ 12:45) (18 - 18)  SpO2: 99% (02-02-24 @ 12:45) (99% - 99%)  Appearance: No acute distress, well developed  Eyes: normal appearing conjunctiva, pupils and eyelids  Cardiovascular: irregularly irregular and rapid   Respiratory: Lungs clear to auscultation	bilaterally.  No wheeze, rhonchi, rales noted  Gastrointestinal:  Soft, NT/ND 	  Neurologic:  No deficit noted  Psych: A&Ox3, normal mood/affect  Musculoskeletal: normal gait  Skin: no rash noted, normal color and pigmentation.        LABS:                        15.6   10.07 )-----------( 301      ( 02 Feb 2024 12:57 )             48.3     02-02    141  |  103  |  20  ----------------------------<  102<H>  3.7   |  24  |  1.25    Ca    10.0      02 Feb 2024 12:57    TPro  7.5  /  Alb  4.2  /  TBili  0.6  /  DBili  x   /  AST  18  /  ALT  9<L>  /  AlkPhos  91  02-02    PT/INR - ( 02 Feb 2024 12:57 )   PT: 17.1 sec;   INR: 1.52          PTT - ( 02 Feb 2024 12:57 )  PTT:88.6 sec    EKG: AF with RVR       Assessment Plan:  88 year old female with HTN, CAD s/p PCI, TIA/CVA, sick sinus syndrome s/p Medtronic dual chamber pacemaker with recent generator change 09/2023, known pAF, who presented to her outpatient cardiologist today noted to be in AF with RVR, referred to the ER and EP consulted.     -admit for DCCV and plan for same day discharge from EP holding  -initiate amiodarone 400mg PO BID x7 days then 200mg PO daily.  -cont uninterrupted ac with pradaxa  -will be able to monitor AFib burden with PPM while on amiodarone.

## 2024-02-02 NOTE — PHYSICAL EXAM

## 2024-02-02 NOTE — DISCHARGE NOTE PROVIDER - NSDCFUSCHEDAPPT_GEN_ALL_CORE_FT
[facility] Physician [facility]  HEARTVASC [address]  Scheduled Appointment: 04/05/2024    Sid Coles  [facility]  PULMMED [address]  Scheduled Appointment: 04/30/2024     Rigo Garcia  [facility]  HEARTVASC [address]  Scheduled Appointment: 03/12/2024    [facility] Physician [facility]  HEARTVASC [address]  Scheduled Appointment: 04/05/2024    Sid Coles  [facility] Physician [facility]  PULMMED [address]  Scheduled Appointment: 04/30/2024

## 2024-02-02 NOTE — CARDIOLOGY SUMMARY
[de-identified] : 2/2/24: Afib w/RVR to 141 9/5/23: V-paced 4/4/23: NSR, non-specific diffuse TW changes 10/25//2022: NSR, non-specific diffuse TW changes 5/10/2022: NSR, non-specific diffuse TW changes [de-identified] : 6/10/2022:Normal biventricular systolic function. Mildly enlarged LA. Grade II diastolic dysfunction. Mild to mod MR. Mild AI. No pericardial effusion.  [de-identified] : Pharmacological stress test performed on 11/2/22 Hx of 2 stents in 2011. \par  Normal ECG portion. Normal perfusion portion. TID ischemia vs elev EDP. No recurrent chest pains. BP was wnll during stress.  [de-identified] : Hx of cath with stents

## 2024-02-02 NOTE — REVIEW OF SYSTEMS
[Negative] : Heme/Lymph [Headache] : headache [Feeling Fatigued] : feeling fatigued [Dyspnea on exertion] : dyspnea during exertion

## 2024-02-02 NOTE — DISCUSSION/SUMMARY
[EKG obtained to assist in diagnosis and management of assessed problem(s)] : EKG obtained to assist in diagnosis and management of assessed problem(s) [FreeTextEntry1] : 88 year old female with SSS s/p PPM, HTN, Afib on Pradaxa, CAD (hx of stents), pulmonary sarcoidosis, asthma, and TIA/CVA is here for f/u.   # pAfib c/b CVA - Noted Afib RVR on ECG, likely the explanation for her symptoms. No signs of volume overload. She was sent to ER for cardiac tele admission. Endorsing compliance with Pradaxa - may benefit from DCCV. Discussed with cardiac tele attending and EP.  # Shortness of breath - previously 2/2 to loss of synchrony and VVI pacing with battery end of life - now s/p generator changed on 9/7/23 with improvement. BNP improved to 683 (from 4k). To be reassessed after hospital visit # Chest pain - resolved. Normal perfusion on pharm stress testing. No symptoms or change in ET. TID may have been in setting of elev EDP given grade II DD. To be reassessed after hospital visit #. CAD - hx of PCI. > 5 yrs ago. Off antiplatelet agents, on Pradaxa bid #. Hyperlipidemia -  (goal <70) in 8/2023. Cont Pravastatin 80 mg qd as pt has become vegetarian and would like to attempt dietary changes. Will re-check lipids at next visit.   #. HTN - Acceptable today and per patient says it is controlled at home. Cont Toprol XL 75 mg bid. ?hx of allergy to Lisinopril. She cannot recall but says the tongue may have swollen [place] she took it. Hx of a bad reaction to Norvasc as well. Cont lasix 20 mg PRN as above #. Pulm Sarcoidosis and Asthma - stable, followed by Pulm - Dr. Riggs. Referred to new pulmonologist Dr. Mena  F/u post-hospital

## 2024-02-02 NOTE — DISCHARGE NOTE PROVIDER - HOSPITAL COURSE
Subjective:    pt is s/p successful DCCV  plan for same day discharge with amiodarone and uninterrupted ac       denies chest pain, palpitations, dizziness, syncope, nausea, vomiting, diarrhea, groin pain, facial droop or extremity weakness.     Inpatient Medications:   aMIOdarone    Tablet 400 milliGRAM(s) Oral two times a day      Allergies: latex (Other)  codeine (Unknown)      ROS:   CONSTITUTIONAL: No fever, weight loss + fatigue  EYES: Pt denies  RESPIRATORY: No cough, wheezing, chills or hemoptysis; No Shortness of Breath  CARDIOVASCULAR: see HPI  GASTROINTESTINAL: Pt denies  NEUROLOGICAL: Pt denies  SKIN: Pt denies   PSYCHIATRIC: Pt denies  HEME/LYMPH: Pt denies    PHYSICAL:  T(C): 36.6 (02-02-24 @ 15:11), Max: 36.6 (02-02-24 @ 15:11)  HR: 133 (02-02-24 @ 15:24) (101 - 164)  BP: 138/100 (02-02-24 @ 15:11) (133/79 - 138/100)  RR: 18 (02-02-24 @ 15:11) (18 - 18)  SpO2: 98% (02-02-24 @ 15:11) (98% - 99%)  Appearance: No acute distress, well developed  Eyes: normal appearing conjunctiva, pupils and eyelids  Cardiovascular: irregularly irregular   Respiratory: Lungs clear to auscultation	bilaterally.  No wheeze, rhonchi, rales noted  Gastrointestinal:  Soft, NT/ND 	  Neurologic:  No deficit noted  Psych: A&Ox3, normal mood/affect  Extremities: no edema.        LABS:                        15.6   10.07 )-----------( 301      ( 02 Feb 2024 12:57 )             48.3     02-02    141  |  103  |  20  ----------------------------<  102<H>  3.7   |  24  |  1.25    Ca    10.0      02 Feb 2024 12:57    TPro  7.5  /  Alb  4.2  /  TBili  0.6  /  DBili  x   /  AST  18  /  ALT  9<L>  /  AlkPhos  91  02-02    PT/INR - ( 02 Feb 2024 12:57 )   PT: 17.1 sec;   INR: 1.52          PTT - ( 02 Feb 2024 12:57 )  PTT:88.6 sec  TSH  Troponin      Assessment Plan:  Assessment Plan:  88 year old female with HTN, CAD s/p PCI, TIA/CVA, sick sinus syndrome s/p Medtronic dual chamber pacemaker with recent generator change 09/2023, known pAF, who presented to her outpatient cardiologist today noted to be in AF with RVR, referred to the ER and EP consulted.     -admit for DCCV and plan for same day discharge  -initiate amiodarone 400mg PO BID x7 days then 200mg PO daily. will send rx to [facility] on [address]   -cont uninterrupted ac with pradaxa  -will be able to monitor AFib burden with PPM while on amiodarone.         Subjective:    pt is s/p successful DCCV  plan for same day discharge with amiodarone and uninterrupted ac       denies chest pain, palpitations, dizziness, syncope, nausea, vomiting, diarrhea, groin pain, facial droop or extremity weakness.     Inpatient Medications:   aMIOdarone    Tablet 400 milliGRAM(s) Oral two times a day      Allergies: latex (Other)  codeine (Unknown)      ROS:   CONSTITUTIONAL: No fever, weight loss + fatigue  EYES: Pt denies  RESPIRATORY: No cough, wheezing, chills or hemoptysis; No Shortness of Breath  CARDIOVASCULAR: see HPI  GASTROINTESTINAL: Pt denies  NEUROLOGICAL: Pt denies  SKIN: Pt denies   PSYCHIATRIC: Pt denies  HEME/LYMPH: Pt denies    PHYSICAL:  T(C): 36.6 (02-02-24 @ 15:11), Max: 36.6 (02-02-24 @ 15:11)  HR: 133 (02-02-24 @ 15:24) (101 - 164)  BP: 138/100 (02-02-24 @ 15:11) (133/79 - 138/100)  RR: 18 (02-02-24 @ 15:11) (18 - 18)  SpO2: 98% (02-02-24 @ 15:11) (98% - 99%)  Appearance: No acute distress, well developed  Eyes: normal appearing conjunctiva, pupils and eyelids  Cardiovascular: irregularly irregular   Respiratory: Lungs clear to auscultation	bilaterally.  No wheeze, rhonchi, rales noted  Gastrointestinal:  Soft, NT/ND 	  Neurologic:  No deficit noted  Psych: A&Ox3, normal mood/affect  Extremities: no edema.        LABS:                        15.6   10.07 )-----------( 301      ( 02 Feb 2024 12:57 )             48.3     02-02    141  |  103  |  20  ----------------------------<  102<H>  3.7   |  24  |  1.25    Ca    10.0      02 Feb 2024 12:57    TPro  7.5  /  Alb  4.2  /  TBili  0.6  /  DBili  x   /  AST  18  /  ALT  9<L>  /  AlkPhos  91  02-02    PT/INR - ( 02 Feb 2024 12:57 )   PT: 17.1 sec;   INR: 1.52          PTT - ( 02 Feb 2024 12:57 )  PTT:88.6 sec      Assessment Plan:  88 year old female with HTN, CAD s/p PCI, TIA/CVA, sick sinus syndrome s/p Medtronic dual chamber pacemaker with recent generator change 09/2023, known pAF, who presented to her outpatient cardiologist today noted to be in AF with RVR, referred to the ER and EP consulted.     -admit for DCCV and plan for same day discharge  -initiate amiodarone 400mg PO BID x7 days then 200mg PO daily. will send rx to [facility] on [address]   -cont uninterrupted ac with pradaxa  -will be able to monitor AFib burden with PPM while on amiodarone.

## 2024-02-02 NOTE — DISCHARGE NOTE NURSING/CASE MANAGEMENT/SOCIAL WORK - PATIENT PORTAL LINK FT
You can access the FollowMyHealth Patient Portal offered by [facility] by registering at the following website: [email or web address]. By joining [facility]’s FollowMyHealth portal, you will also be able to view your health information using other applications (apps) compatible with our system.

## 2024-02-02 NOTE — ED PROVIDER NOTE - OBJECTIVE STATEMENT
88 F co weakness- pmh afib on pradaxa- co weakness with adl's - with normal activities she is very weak  has to rest more frequently- found to be in afib- compliant w metoprolol succinate  also w medtronic pacer for sss - hr today 130-140's  mod severity  exac- light activity  allev-rest  no cp no sob

## 2024-02-03 ENCOUNTER — NON-APPOINTMENT (OUTPATIENT)
Age: 89
End: 2024-02-03

## 2024-02-03 DIAGNOSIS — Z79.01 LONG TERM (CURRENT) USE OF ANTICOAGULANTS: ICD-10-CM

## 2024-02-03 DIAGNOSIS — I48.19 OTHER PERSISTENT ATRIAL FIBRILLATION: ICD-10-CM

## 2024-02-03 DIAGNOSIS — Z95.0 PRESENCE OF CARDIAC PACEMAKER: ICD-10-CM

## 2024-02-06 DIAGNOSIS — K57.30 DIVERTICULOSIS OF LARGE INTESTINE WITHOUT PERFORATION OR ABSCESS WITHOUT BLEEDING: ICD-10-CM

## 2024-02-06 DIAGNOSIS — Z91.040 LATEX ALLERGY STATUS: ICD-10-CM

## 2024-02-06 DIAGNOSIS — Z86.73 PERSONAL HISTORY OF TRANSIENT ISCHEMIC ATTACK (TIA), AND CEREBRAL INFARCTION WITHOUT RESIDUAL DEFICITS: ICD-10-CM

## 2024-02-06 DIAGNOSIS — Z95.0 PRESENCE OF CARDIAC PACEMAKER: ICD-10-CM

## 2024-02-06 DIAGNOSIS — I25.10 ATHEROSCLEROTIC HEART DISEASE OF NATIVE CORONARY ARTERY WITHOUT ANGINA PECTORIS: ICD-10-CM

## 2024-02-06 DIAGNOSIS — I10 ESSENTIAL (PRIMARY) HYPERTENSION: ICD-10-CM

## 2024-02-06 DIAGNOSIS — I49.5 SICK SINUS SYNDROME: ICD-10-CM

## 2024-02-06 DIAGNOSIS — D86.9 SARCOIDOSIS, UNSPECIFIED: ICD-10-CM

## 2024-02-06 DIAGNOSIS — Z88.5 ALLERGY STATUS TO NARCOTIC AGENT: ICD-10-CM

## 2024-02-06 DIAGNOSIS — I48.91 UNSPECIFIED ATRIAL FIBRILLATION: ICD-10-CM

## 2024-02-06 DIAGNOSIS — I48.19 OTHER PERSISTENT ATRIAL FIBRILLATION: ICD-10-CM

## 2024-02-07 ENCOUNTER — NON-APPOINTMENT (OUTPATIENT)
Age: 89
End: 2024-02-07

## 2024-02-07 ENCOUNTER — APPOINTMENT (OUTPATIENT)
Dept: HOME HEALTH SERVICES | Facility: HOME HEALTH | Age: 89
End: 2024-02-07
Payer: MEDICARE

## 2024-02-07 VITALS
RESPIRATION RATE: 20 BRPM | HEIGHT: 60 IN | SYSTOLIC BLOOD PRESSURE: 132 MMHG | DIASTOLIC BLOOD PRESSURE: 74 MMHG | HEART RATE: 64 BPM | OXYGEN SATURATION: 95 % | TEMPERATURE: 97.9 F

## 2024-02-07 DIAGNOSIS — Z87.898 PERSONAL HISTORY OF OTHER SPECIFIED CONDITIONS: ICD-10-CM

## 2024-02-07 DIAGNOSIS — U07.1 COVID-19: ICD-10-CM

## 2024-02-07 DIAGNOSIS — Z71.89 OTHER SPECIFIED COUNSELING: ICD-10-CM

## 2024-02-07 DIAGNOSIS — Z86.69 PERSONAL HISTORY OF OTHER DISEASES OF THE NERVOUS SYSTEM AND SENSE ORGANS: ICD-10-CM

## 2024-02-07 DIAGNOSIS — Z65.8 OTHER SPECIFIED PROBLEMS RELATED TO PSYCHOSOCIAL CIRCUMSTANCES: ICD-10-CM

## 2024-02-07 DIAGNOSIS — Z11.59 ENCOUNTER FOR SCREENING FOR OTHER VIRAL DISEASES: ICD-10-CM

## 2024-02-07 DIAGNOSIS — R39.9 UNSPECIFIED SYMPTOMS AND SIGNS INVOLVING THE GENITOURINARY SYSTEM: ICD-10-CM

## 2024-02-07 DIAGNOSIS — Z86.79 PERSONAL HISTORY OF OTHER DISEASES OF THE CIRCULATORY SYSTEM: ICD-10-CM

## 2024-02-07 PROCEDURE — 99349 HOME/RES VST EST MOD MDM 40: CPT

## 2024-02-07 RX ORDER — DABIGATRAN ETEXILATE MESYLATE 110 MG/1
CAPSULE ORAL
Refills: 0 | Status: ACTIVE | COMMUNITY

## 2024-02-07 NOTE — REASON FOR VISIT
[Acute] : an acute visit [Pre-Visit Preparation] : pre-visit preparation was done [Intercurrent Specialty/Sub-specialty Visits] : the patient has intercurrent specialty/sub-specialty visits

## 2024-02-07 NOTE — HEALTH RISK ASSESSMENT
[Independent] : using telephone [Some assistance needed] : managing finances [Two or more falls in past year] : Patient reported two or more falls in the past year [Yes] : The patient does have visual impairment [HRA Reviewed] : Health risk assessment reviewed

## 2024-02-07 NOTE — HISTORY OF PRESENT ILLNESS
[House Calls Co-Management Patient] : [unfilled] is a House Calls co-management patient [Referred] : has been referred for aide services [Patient] : patient [Patient is stable] : patient is stable [Education provided] : education provided

## 2024-02-07 NOTE — CHRONIC CARE ASSESSMENT
[Less than 3 Times Per Week] : exercises less than 3 times per week [< 30 Minutes/Session] : (< 30 minutes per session) [General Adherence] : and is generally adherent [Inadequate social support] : inadequate social support [Limited decision making ability] : limited decision making ability [Walking] : walking [Low Fat Diet] : low fat [Low Carb Diet] : low carbohydrate [Low Salt Diet] : low salt [PPS Score: ____] : Palliative Performance Scale (PPS) Score: [unfilled]

## 2024-03-20 NOTE — REVIEW OF SYSTEMS
[Feeling Fatigued] : feeling fatigued [Headache] : headache [Dyspnea on exertion] : dyspnea during exertion [Negative] : Heme/Lymph

## 2024-03-22 ENCOUNTER — NON-APPOINTMENT (OUTPATIENT)
Age: 89
End: 2024-03-22

## 2024-03-22 ENCOUNTER — APPOINTMENT (OUTPATIENT)
Dept: HEART AND VASCULAR | Facility: CLINIC | Age: 89
End: 2024-03-22
Payer: MEDICARE

## 2024-03-22 VITALS
HEIGHT: 60 IN | TEMPERATURE: 95 F | WEIGHT: 146 LBS | DIASTOLIC BLOOD PRESSURE: 85 MMHG | SYSTOLIC BLOOD PRESSURE: 169 MMHG | BODY MASS INDEX: 28.66 KG/M2 | OXYGEN SATURATION: 97 % | HEART RATE: 59 BPM

## 2024-03-22 DIAGNOSIS — I10 ESSENTIAL (PRIMARY) HYPERTENSION: ICD-10-CM

## 2024-03-22 PROCEDURE — 99214 OFFICE O/P EST MOD 30 MIN: CPT | Mod: 25

## 2024-03-22 PROCEDURE — 93000 ELECTROCARDIOGRAM COMPLETE: CPT

## 2024-03-22 NOTE — PHYSICAL EXAM
[Well Developed] : well developed [Well Nourished] : well nourished [No Acute Distress] : no acute distress [Normal Venous Pressure] : normal venous pressure [Normal Conjunctiva] : normal conjunctiva [No Murmur] : no murmur [No Carotid Bruit] : no carotid bruit [No Rub] : no rub [Clear Lung Fields] : clear lung fields [No Gallop] : no gallop [No Respiratory Distress] : no respiratory distress  [Good Air Entry] : good air entry [Soft] : abdomen soft [Non Tender] : non-tender [Normal Bowel Sounds] : normal bowel sounds [No Masses/organomegaly] : no masses/organomegaly [Normal Gait] : normal gait [No Edema] : no edema [No Cyanosis] : no cyanosis [No Clubbing] : no clubbing [No Rash] : no rash [No Varicosities] : no varicosities [No Skin Lesions] : no skin lesions [Moves all extremities] : moves all extremities [Normal Speech] : normal speech [No Focal Deficits] : no focal deficits [Normal memory] : normal memory [Alert and Oriented] : alert and oriented [de-identified] : Irregularly irregular rhythm

## 2024-03-22 NOTE — HISTORY OF PRESENT ILLNESS
[FreeTextEntry1] : 88-year-old female with SSS s/p PPM, HTN, Afib on Pradaxa, CAD (hx of stents), pulmonary sarcoidosis, asthma, and TIA/CVA is here for f/u.   Pt sees neurology (Dr. Rick) and Pulm (Dr. Riggs).   3/22/24: Has been feeling better but tired. Has been on amiodarone 200 mg qd after dccv last month. Cont to take Levothyroxine. Did not take BP meds today yet.   2/2/24: Chief complaint today is progressive fatigue which began after she came back from [place] on Jan 1. Since then, she has noted worsening HUANG and lightheadedness, leading to unsteadiness while walking (walks with cane). Denies CP, orthopnea, PND, leg swelling. She saw her PCP on 1/29 and was told she had a fast HR but was sent home / did not want to go to the ER.   11/17/23: Pt is s/p PPM generator change on 9/7 and then saw EP on 9/27 and was started on HCTZ. Feels well since then, states that she is back to her baseline in terms of her breathing. Repeat BNP was 683 (from >4k). She now needs a new pulmonologist since Dr. Riggs is leaving. Is going to [place] for the next 6 months.  9/5/23: Pt has been feeling more SOB over the past few mos. No CP. She was started on lasix by Dr. Riggs on 8/24/23 but it hasn't helped as much as she had hoped. No CP. BNP has been going up (last one was 6072) PPM interrogation today by Dr. Garcia revealed battery - end of life; VVI pacing   4/4/23: No complaints. BP has been well controlled at home.   1/31/23: No cardiac complaints. BP has been much lower and within normal limits at home.   10/25/22: Pt complained of one episode of substernal chest pain in the last two weeks.It lasted about 5 mi, was pressure-like and uncomfortable. BP OK today and Metoprolol was increased today too 75 mg bid by EP to improve Afib rate control based on interrogation of device) as patient does not feel well during the episodes when the rate increases.  She feels a headache and chills when she has afib with RVR.   No shortness of breath. No dyspnea on exertion. No orthopnea. No PND.   _______________________ Latest labs from 10/25/22: LDL 98 K 4.5  Cr 1.25 LFTs wnl A1c 5.9

## 2024-03-22 NOTE — DISCUSSION/SUMMARY
[FreeTextEntry1] : 88 year old female with SSS s/p PPM, HTN, Afib on Pradaxa, CAD (hx of stents), pulmonary sarcoidosis, asthma, and TIA/CVA is here for f/u.   # pAfib c/b CVA - now in NSR after DCCV. Pt is on Pradaxa 150 bid and Toprol XL. Check TSH, CMP today # Shortness of breath - previously 2/2 to loss of synchrony and VVI pacing with battery end of life - now s/p generator changed on 9/7/23 with improvement. BNP improved. Re-check BNP today.  # Chest pain - resolved. Normal perfusion on pharm stress testing. No symptoms or change in ET. TID may have been in setting of elev EDP given grade II DD. To be reassessed after hospital visit #. CAD - hx of PCI. > 5 yrs ago. Off antiplatelet agents, on Pradaxa bid #. Hyperlipidemia -  (goal <70) in 8/2023. Cont Pravastatin 80 mg qd as pt has become vegetarian and would like to attempt dietary changes. Will re-check lipids next visit #. HTN - Acceptable today and per patient says it is controlled at home. Cont Toprol XL 75 mg bid. ?hx of allergy to Lisinopril. She cannot recall but says the tongue may have swollen she she took it. Hx of a bad reaction to Norvasc as well. Cont lasix 20 mg PRN as above. Does not like HCTZ #. Pulm Sarcoidosis and Asthma - stable, followed by Pulm - Dr. Riggs. Referred to new pulmonologist Dr. Mena   [EKG obtained to assist in diagnosis and management of assessed problem(s)] : EKG obtained to assist in diagnosis and management of assessed problem(s)

## 2024-03-22 NOTE — CARDIOLOGY SUMMARY
[de-identified] : 3/22/24: SB  2/2/24: Afib w/RVR to 141 9/5/23: V-paced 4/4/23: NSR, non-specific diffuse TW changes 10/25//2022: NSR, non-specific diffuse TW changes 5/10/2022: NSR, non-specific diffuse TW changes [de-identified] : Pharmacological stress test performed on 11/2/22 Hx of 2 stents in 2011. \par  Normal ECG portion. Normal perfusion portion. TID ischemia vs elev EDP. No recurrent chest pains. BP was wnll during stress.  [de-identified] : 6/10/2022:Normal biventricular systolic function. Mildly enlarged LA. Grade II diastolic dysfunction. Mild to mod MR. Mild AI. No pericardial effusion.  [de-identified] : Hx of cath with stents

## 2024-03-26 ENCOUNTER — APPOINTMENT (OUTPATIENT)
Dept: HOME HEALTH SERVICES | Facility: HOME HEALTH | Age: 89
End: 2024-03-26

## 2024-03-27 ENCOUNTER — RX RENEWAL (OUTPATIENT)
Age: 89
End: 2024-03-27

## 2024-03-27 LAB
ESTIMATED AVERAGE GLUCOSE: 114 MG/DL
HBA1C MFR BLD HPLC: 5.6 %
NT-PROBNP SERPL-MCNC: 565 PG/ML
TSH SERPL-ACNC: 15.2 UIU/ML

## 2024-03-27 RX ORDER — AMIODARONE HYDROCHLORIDE 100 MG/1
100 TABLET ORAL DAILY
Qty: 90 | Refills: 2 | Status: ACTIVE | COMMUNITY
Start: 2024-02-03 | End: 1900-01-01

## 2024-04-02 ENCOUNTER — NON-APPOINTMENT (OUTPATIENT)
Age: 89
End: 2024-04-02

## 2024-04-02 ENCOUNTER — APPOINTMENT (OUTPATIENT)
Dept: HEART AND VASCULAR | Facility: CLINIC | Age: 89
End: 2024-04-02
Payer: MEDICARE

## 2024-04-02 VITALS
WEIGHT: 146 LBS | BODY MASS INDEX: 28.66 KG/M2 | DIASTOLIC BLOOD PRESSURE: 74 MMHG | TEMPERATURE: 95.7 F | HEART RATE: 76 BPM | SYSTOLIC BLOOD PRESSURE: 165 MMHG | HEIGHT: 60 IN

## 2024-04-02 PROCEDURE — 99213 OFFICE O/P EST LOW 20 MIN: CPT | Mod: 25

## 2024-04-02 PROCEDURE — 93280 PM DEVICE PROGR EVAL DUAL: CPT

## 2024-04-02 RX ORDER — FUROSEMIDE 20 MG/1
20 TABLET ORAL DAILY
Qty: 30 | Refills: 0 | Status: DISCONTINUED | COMMUNITY
Start: 2023-08-24 | End: 2024-04-02

## 2024-04-05 NOTE — CARDIOLOGY SUMMARY
[de-identified] : 5/10/2022: NSR, non-specific diffuse TW changes [de-identified] : 6/10/2022:Normal biventricular systolic function. Mildly enlarged LA. Grade II diastolic dysfunction. Mild to mod MR. Mild AI. No pericardial effusion.  [de-identified] : Hx of cath with stents

## 2024-04-05 NOTE — HISTORY OF PRESENT ILLNESS
[de-identified] : This is an 88 year old female with SSS S/P PPM, HTN, Afib, CAD and TIA/CVA. She was previously on Xarelto.  Records reflect an episode of gum bleeding (requiring packing) while on Xarelto, as well as possible GI ulcer and significant bruising.  Later started on Eliquis and reported significant bruising.  Eliquis was switched to Pradaxa and she continues to feel the bruising has been less on this agent.  Sees neurology and GI (Dr. Gutierrez).  Sees Dr. Fatima for CAD hx stent.  H/o gait instability- evaluated by Dr. Rick,  no acute infarct/hemorrhages.  OK to continue Pradaxa.  Her device reached SULY, now s/p elective generator change on 9/7/23.  She offers no device related complaints.   She had an elective DCCV on 2/2/24.  Remains on uninterrupted Pradaxa without issue so far.  Endorses weakness in the knees. Remains in sinus rhythm since cardioversion.   TSH high recently with subsequent adjustment of Amio (decreased to 100 mg daily) and increase on Synthroid by cardiologist.  SEE PACEART [Palpitations] : no palpitations [SOB] : no dyspnea [Syncope] : no syncope [Dizziness] : no dizziness [Chest Pain] : no chest pain or discomfort [Pain at Site] : no pain at device site [TextEntry] : This is an 88 year old female with SSS S/P PPM (Medtronic), HTN, Afib, CAD and TIA/CVA. She was previously on Xarelto.  Records reflect an episode of gum bleeding (requiring packing) while on Xarelto, as well as possible GI ulcer and significant bruising.  Later started on Eliquis and reported significant bruising.  Eliquis was switched to Pradaxa and she continues to feel the bruising has been less on this agent.  Sees neurology and GI (Dr. Gutierrez).  Sees Dr. Fatima for CAD hx stent.  H/o gait instability- evaluated by Dr. Rick,  no acute infarct/hemorrhages.  OK to continue Pradaxa.  Her device reached SULY, now s/p elective generator change on 9/7/23.  She offers no device related complaints.   She had an elective DCCV on 2/2/24.  Remains on uninterrupted Pradaxa without issue so far.  Endorses weakness in the knees. Remains in sinus rhythm since cardioversion.   TSH high recently with subsequent adjustment of Amio (decreased to 100 mg daily) and increase on Synthroid by cardiologist.  SEE PACEART

## 2024-04-05 NOTE — ADDENDUM
[FreeTextEntry1] : I, Dr. Rigo Matias, personally performed the services described in the documentation, reviewed the documentation as recorded by the scribe, in my presence.  It accurately records my words and actions.  I, JENA Estrada, am scribing for and in the presence of DR. MATIAS for the following sections: history of present illness, past medical/surgical/family/social history, medication reconciliation, allergies, review of systems, vital signs, physical exam and disposition.

## 2024-04-05 NOTE — PHYSICAL EXAM
[General Appearance - Well Developed] : well developed [Normal Appearance] : normal appearance [Well Groomed] : well groomed [General Appearance - Well Nourished] : well nourished [No Deformities] : no deformities [General Appearance - In No Acute Distress] : no acute distress [Left Infraclavicular] : left infraclavicular area [Clean] : clean [Dry] : dry [Well-Healed] : well-healed [Well Developed] : well developed [Well Nourished] : well nourished [No Acute Distress] : no acute distress [Normal Venous Pressure] : normal venous pressure [Normal Conjunctiva] : normal conjunctiva [No Murmur] : no murmur [No Carotid Bruit] : no carotid bruit [Normal S1, S2] : normal S1, S2 [No Rub] : no rub [Clear Lung Fields] : clear lung fields [No Gallop] : no gallop [Good Air Entry] : good air entry [Soft] : abdomen soft [No Respiratory Distress] : no respiratory distress  [No Masses/organomegaly] : no masses/organomegaly [Non Tender] : non-tender [Normal Bowel Sounds] : normal bowel sounds [Normal Gait] : normal gait [No Cyanosis] : no cyanosis [No Clubbing] : no clubbing [No Varicosities] : no varicosities [No Rash] : no rash [No Skin Lesions] : no skin lesions [No Focal Deficits] : no focal deficits [Moves all extremities] : moves all extremities [Normal Speech] : normal speech [Alert and Oriented] : alert and oriented [Normal memory] : normal memory [Palpable Crepitus] : no palpable crepitus [Bleeding] : no active bleeding [Foul Odor] : no foul smell [Purulent Drainage] : no purulent drainage [Serosanguineous Drainage] : no serosanquineous drainage [Serous Drainage] : no serous drainage [Erythema] : not erythematous [Warm] : not warm [Tender] : not tender [Indurated] : not indurated [Fluctuant] : not fluctuant [de-identified] : 1+ edema b/l

## 2024-04-05 NOTE — DISCUSSION/SUMMARY
[FreeTextEntry1] : 88 year old female with SSS s/p PPM, HTN, Afib on Pradaxa, CAD (hx of stents), pulmonary sarcoidosis, asthma, and TIA/CVA, here for follow up.   1. Afib: Remains in sinus rhythm post cardioversion in Feb. Continue Pradaxa. Continue Amiodarone 100 mg daily.  2. Pacemaker for sick sinus: Normal device function. All parameters WNL. See PACEART.  f/u in 6 months

## 2024-04-11 ENCOUNTER — APPOINTMENT (OUTPATIENT)
Dept: PULMONOLOGY | Facility: CLINIC | Age: 89
End: 2024-04-11

## 2024-04-11 ENCOUNTER — APPOINTMENT (OUTPATIENT)
Dept: PULMONOLOGY | Facility: CLINIC | Age: 89
End: 2024-04-11
Payer: MEDICARE

## 2024-04-11 VITALS
TEMPERATURE: 97.9 F | RESPIRATION RATE: 16 BRPM | HEIGHT: 60 IN | HEART RATE: 79 BPM | DIASTOLIC BLOOD PRESSURE: 78 MMHG | WEIGHT: 145 LBS | SYSTOLIC BLOOD PRESSURE: 142 MMHG | BODY MASS INDEX: 28.47 KG/M2 | OXYGEN SATURATION: 96 %

## 2024-04-11 PROCEDURE — 99215 OFFICE O/P EST HI 40 MIN: CPT | Mod: GC

## 2024-04-11 NOTE — PROCEDURE
[FreeTextEntry1] : CT chest 8/20/2023: Interlobular septal thickening in SHERRY GGO in the right upper lobe posteriorly and medially Some pleural thickening noted Few subpleural nodules noted In the L hilum and R hilum there is dilated airways and reticular changes suggestive of sarcoidosis changes. Evidence of traction bronchiectasis with coronary artery calcification Plate like atelectasis in the lingula Some dilitation and thickening of the airways especially in the lower lobes

## 2024-04-11 NOTE — ASSESSMENT
[FreeTextEntry1] : 4-11-24:  It was a pleasure to meet Edie today in consultation. Her respiratory issues are summarized:  1. Sarcoidosis  Patient was diagnosed clinically with sarcoidosis and seems to have never had treatment for this in the past. She was diagnosed with this based on chest imaging. It is likely that she has sarcoidosis based on the below  1. Mediastinal and Hilar adenopathy 2. Bronchocentric fibrosis 3. Predominance of abnormalities in the upper lobes as compared to lower lobes however no clinical signs of active disease  We will get a follow up CT scan in august of 2024 to follow up. She has also not had pulmonary function testing in a long time. We will repeat PFT as well. Finally today we will send off blood test to look for activity of Sarcoidosis.  - ACE, calcium, soluabile IL2, Liver function - CT scan in august 2024 - PFT next visit - Return to clinic in 6 months  2. Asthma  She is well maintained on symbicort which she only takes in the evening time but has not needed prednisone in some time and has not had significant wheezing that she has noticed with no albuterol needed. The incidence of asthma is higher in those with sarcoidosis. However, on examination she has high pitched wheezing in the parasternal regions. It is possible these are coming from the turbulence coming from the airflow turbulance coming from the distorted larger airways. Currently she is on symbicort we will determine airway responsiveness and NiOx at next visit. Will give pneumonia vaccine today.  - Continue Symbicort - PFT 6 minute walk and Niox at next visit

## 2024-04-11 NOTE — HISTORY OF PRESENT ILLNESS
[Never] : never [TextBox_4] : 87 yo female with hx of sarcoidosis previously follows with Dr. Riggs now transferring her pulmonary care to Dr. Coles. She states that she was diagnosed with sarcoidosis but never had a biopsy and never required treatment for her sarcoidosis. She follows with an opthamologist as well as cardiologist with no other sequalae of her sarcoidosis. She was diagnosed with asthma by Dr. Riggs and does not remember the [facility] time that she needed steroids.   She worked as an  for [employer] with no exposure hx that she is aware of as well as no pets or birds. She has no family hx of lung disease that she is aware of and a never smoker.

## 2024-04-11 NOTE — DISCUSSION/SUMMARY
[FreeTextEntry1] : ATTENDING'S SUMMARY 4-11-24:  No pallor, no icterus no JVD, no hepatojugular reflux, no HSM no cervical adenopathy, no supraclavicular adenopathy, mild limitation in side to side movement of the neck normal s1/s2, no murmurs, rubs or gallops, P2 not loud or split. good air entry bilaterally, no wheezing, rhonchi or crackles, no dullness to percussion, however occassional high pitch wheezing on forceful expiration no cyanosis, no clubbing, no articular manifestations, no thickening of the skin, no ryanoids, good radial pulses 1+ pitting edema equal bilaterally

## 2024-04-12 LAB
ALBUMIN SERPL ELPH-MCNC: 4.5 G/DL
ALP BLD-CCNC: 85 U/L
ALT SERPL-CCNC: 12 U/L
AST SERPL-CCNC: 16 U/L
BILIRUB DIRECT SERPL-MCNC: 0.1 MG/DL
BILIRUB INDIRECT SERPL-MCNC: 0.2 MG/DL
BILIRUB SERPL-MCNC: 0.3 MG/DL
CALCIUM SERPL-MCNC: 9.4 MG/DL
PROT SERPL-MCNC: 7.3 G/DL

## 2024-04-15 ENCOUNTER — RESULT REVIEW (OUTPATIENT)
Age: 89
End: 2024-04-15

## 2024-04-15 ENCOUNTER — OUTPATIENT (OUTPATIENT)
Dept: OUTPATIENT SERVICES | Facility: HOSPITAL | Age: 89
LOS: 1 days | End: 2024-04-15
Payer: MEDICARE

## 2024-04-15 ENCOUNTER — APPOINTMENT (OUTPATIENT)
Dept: ORTHOPEDIC SURGERY | Facility: CLINIC | Age: 89
End: 2024-04-15
Payer: MEDICARE

## 2024-04-15 VITALS
WEIGHT: 145 LBS | SYSTOLIC BLOOD PRESSURE: 127 MMHG | HEIGHT: 60 IN | BODY MASS INDEX: 28.47 KG/M2 | HEART RATE: 78 BPM | DIASTOLIC BLOOD PRESSURE: 85 MMHG | OXYGEN SATURATION: 98 %

## 2024-04-15 DIAGNOSIS — M85.80 OTHER SPECIFIED DISORDERS OF BONE DENSITY AND STRUCTURE, UNSPECIFIED SITE: ICD-10-CM

## 2024-04-15 LAB — ACE BLD-CCNC: 6 U/L

## 2024-04-15 PROCEDURE — 99214 OFFICE O/P EST MOD 30 MIN: CPT

## 2024-04-15 PROCEDURE — 73030 X-RAY EXAM OF SHOULDER: CPT | Mod: 26,LT,RT

## 2024-04-15 PROCEDURE — 73030 X-RAY EXAM OF SHOULDER: CPT

## 2024-04-15 NOTE — ASSESSMENT
[FreeTextEntry1] : Vinay Vila is a 88 year old female with bilateral shoulder pain. I discussed with the patient that their symptoms, signs, and imaging are most consistent with rotator cuff tendinopathy and uncontrolled hypothyroidism. We reviewed the natural history of this condition and treatment options. We agreed on the following plan:   X-ray taken and reviewed with patient today.  Encouraged to continue home exercises per the handouts.  New handout for knee exercises provided today.  Recommend 150 min per week of moderate intensity aerobic activity Medication: Diclofenac gel, PRN, patient has at home.  Imaging: X-ray bilateral knees next visit. Discussed [place] guided. CSI if symptoms worsen. Can try subacromial. Follow up in 6 weeks.

## 2024-04-15 NOTE — ADDENDUM
[FreeTextEntry1] :  Documented by Bessie Denton acting as a scribe under Dr. Vashti Alfredo. 04/15/2024

## 2024-04-15 NOTE — PHYSICAL EXAM
[de-identified] : General: Well-nourished, well-developed, alert, and in no acute distress. Head: Normocephalic. Eyes: Pupils equal, extraocular muscles intact, normal sclera. Nose: No nasal discharge. Cardiovascular: Extremities are warm and well perfused. Distal pulses are symmetric bilaterally. Respiratory: No labored breathing. Extremities: Sensation is intact distally bilaterally. Distal pulses are symmetric bilaterally Lymphatic: No regional lymphadenopathy, no lymphedema Neurologic: No focal deficits Skin: Normal skin color, texture, and turgor Psychiatric: Normal affect  MSK: C-spine demonstrates no tenderness to palpation midline, paraspinals. AROM: normal  Cervical facet loading: negative Spurling's Compression: negative   Examination of right shoulder shows no overlying skin changes or muscular atrophy. There is no tenderness to palpation over the AC joint, Bicipital groove, Trapezius Range of motion shows forward elevation of 180, abduction 180, external rotation 60, and internal rotation to the mid thoracic spine.   There is no pain at the end range of motion.   Special Tests: Neer's negative Hawkin's negative Jeremy's positive Resisted ext rotation negative Lift off negative Auburn negative Speed's negative Apprehension test negative No scapular winging.   Examination of left shoulder shows no overlying skin changes or muscular atrophy. There is TTP of Bicipital groove. There is no tenderness to palpation over the AC joint, Trapezius   Range of motion shows forward elevation of 180, abduction 180, external rotation 60, and internal rotation to the mid thoracic spine.   There is no pain at the end range of motion.   Special Tests: Neer's negative Hawkin's negative Jeremy's positive Resisted ext rotation negative Belly press negative Auburn negative Speed's negative Apprehension test negative No scapular winging.   Sensation is intact to light touch over the axillary, musculocutaneous, median, radial, and ulnar nerve distributions bilaterally. Capillary refill is less than two seconds. Radial pulses 2+ equal bilaterally. Strength testing shows 5/5 abduction, 5/5 external rotation, 5/5 internal rotation, 5/5 biceps, 5/5 triceps. 5/5 wrist extension, 5/5 intrinsics. Reflexes 2+ biceps, brachioradialis. Examination of right knee:     Gait is slow shuffling but nonantalgic Alignment: genu Effusion: No erythema, hematoma or skin lesion Tender to palpation: Nontender to palpation: medial joint line, lateral joint line, medial patellar facet, lateral patellar facet, quad tendon, patellar tendon, pes, Gerdy's tubercle, tibial tuberosity, popliteal fossa, hamstrings, ITB No warmth No Baker's cyst palpable ROM: 0-100 Mild patellar crepitus   Log roll negative Lachman negative Anterior drawer negative Posterior drawer negative Varus/valgus stress negative at 0 and 30 deg Anjelica negative Patellar grind negative Extensor mechanism intact     Examination of left knee:   No effusion, erythema, hematoma or skin lesion Nontender to palpation: medial joint line, lateral joint line, medial patellar facet, lateral patellar facet, quad tendon, patellar tendon, pes, Gerdy's tubercle, tibial tuberosity, popliteal fossa, hamstrings, ITB No warmth No Baker's cyst palpable ROM: 0-100 Mild patellar crepitus   Log roll negative Lachman negative Anterior drawer negative Posterior drawer negative Varus/valgus stress negative at 0 and 30 deg Anjelica negative Patellar grind negative Extensor mechanism intact   Sensation is intact to light touch over the superficial and deep peroneal nerve distributions and the posterior tibial nerve distribution. Capillary refill is less than two seconds. Posterior tibial and dorsalis pedis pulses 2+ equal bilaterally. No calf swelling or tenderness bilaterally. Strength testing shows hip flexion 5/5, hip adduction 5/5, hip abduction 5/5, knee extension 5/5, knee flexion 5/5, dorsiflexion 5/5, plantar flexion 5/5, EHL 5/5 Reflexes: Patellar 2+, Achilles 2+

## 2024-04-15 NOTE — HISTORY OF PRESENT ILLNESS
[de-identified] : DAVIN VALLEJO is a 88 year old female presents to follow up on bilateral shoulder pain.  Last visit was 11/09/23 Not currently taking anything for pain. Has been doing at home exercises every other day and PT. Pt has had no recent falls. No numbness or tingling down the arms.   Of note, patient had recent TSH elevation 15, has not seen an endocrinologist since 2020. Last DEXA scan was taken in 2022, significant for osteopenia. Calcium and vitamin D levels were normal at that time. The patient also mentions bilateral knee discomfort and occasional buckling, ambulating with a cane. Does not like to take any oral analgesia.

## 2024-04-15 NOTE — CONSULT LETTER
[Dear  ___] : Dear  [unfilled], [Consult Letter:] : I had the pleasure of evaluating your patient, [unfilled]. [Please see my note below.] : Please see my note below. [Sincerely,] : Sincerely, [FreeTextEntry3] : Vashti Alfredo MD

## 2024-04-15 NOTE — DISCUSSION/SUMMARY

## 2024-04-15 NOTE — END OF VISIT
[FreeTextEntry3] :  Documented by Bessie Denton acting as a scribe for Dr. Vashti Alfredo. 04/15/2024   All medical record entries made by the Scribe were at my, Dr. Vashti Alfredo. direction and personally dictated by me on 04/15/2024. I have reviewed the chart and agree that the record accurately reflects my personal performance of the history, physical exam, assessment and plan. I have also personally directed, reviewed, and agreed with the chart. [Time Spent: ___ minutes] : I have spent [unfilled] minutes of time on the encounter.

## 2024-04-19 ENCOUNTER — APPOINTMENT (OUTPATIENT)
Dept: ENDOCRINOLOGY | Facility: CLINIC | Age: 89
End: 2024-04-19
Payer: MEDICARE

## 2024-04-19 VITALS
HEART RATE: 69 BPM | BODY MASS INDEX: 28.51 KG/M2 | WEIGHT: 146 LBS | DIASTOLIC BLOOD PRESSURE: 66 MMHG | SYSTOLIC BLOOD PRESSURE: 138 MMHG

## 2024-04-19 PROCEDURE — 99204 OFFICE O/P NEW MOD 45 MIN: CPT | Mod: 25

## 2024-04-23 LAB
T3 SERPL-MCNC: 71 NG/DL
T4 FREE SERPL-MCNC: 2.6 NG/DL
THYROGLOB AB SERPL-ACNC: <20 IU/ML
THYROPEROXIDASE AB SERPL IA-ACNC: 17 IU/ML
TSH SERPL-ACNC: 0.91 UIU/ML

## 2024-04-29 RX ORDER — LEVOTHYROXINE SODIUM 0.1 MG/1
100 TABLET ORAL DAILY
Qty: 90 | Refills: 0 | Status: DISCONTINUED | COMMUNITY
Start: 2017-10-02 | End: 2024-04-29

## 2024-04-29 RX ORDER — LEVOTHYROXINE SODIUM 0.09 MG/1
88 TABLET ORAL DAILY
Qty: 1 | Refills: 1 | Status: ACTIVE | COMMUNITY
Start: 2024-04-23 | End: 1900-01-01

## 2024-04-30 ENCOUNTER — APPOINTMENT (OUTPATIENT)
Dept: PULMONOLOGY | Facility: CLINIC | Age: 89
End: 2024-04-30

## 2024-05-02 ENCOUNTER — APPOINTMENT (OUTPATIENT)
Dept: HOME HEALTH SERVICES | Facility: HOME HEALTH | Age: 89
End: 2024-05-02
Payer: MEDICARE

## 2024-05-02 VITALS
DIASTOLIC BLOOD PRESSURE: 72 MMHG | RESPIRATION RATE: 20 BRPM | SYSTOLIC BLOOD PRESSURE: 126 MMHG | TEMPERATURE: 98.3 F | OXYGEN SATURATION: 99 % | HEIGHT: 60 IN | HEART RATE: 74 BPM

## 2024-05-02 DIAGNOSIS — I48.91 UNSPECIFIED ATRIAL FIBRILLATION: ICD-10-CM

## 2024-05-02 DIAGNOSIS — R26.89 OTHER ABNORMALITIES OF GAIT AND MOBILITY: ICD-10-CM

## 2024-05-02 DIAGNOSIS — Z87.898 PERSONAL HISTORY OF OTHER SPECIFIED CONDITIONS: ICD-10-CM

## 2024-05-02 DIAGNOSIS — Z95.0 PRESENCE OF CARDIAC PACEMAKER: ICD-10-CM

## 2024-05-02 DIAGNOSIS — Z28.21 IMMUNIZATION NOT CARRIED OUT BECAUSE OF PATIENT REFUSAL: ICD-10-CM

## 2024-05-02 DIAGNOSIS — I67.9 CEREBROVASCULAR DISEASE, UNSPECIFIED: ICD-10-CM

## 2024-05-02 DIAGNOSIS — I25.10 ATHEROSCLEROTIC HEART DISEASE OF NATIVE CORONARY ARTERY W/OUT ANGINA PECTORIS: ICD-10-CM

## 2024-05-02 DIAGNOSIS — D86.0 SARCOIDOSIS OF LUNG: ICD-10-CM

## 2024-05-02 DIAGNOSIS — R06.09 OTHER FORMS OF DYSPNEA: ICD-10-CM

## 2024-05-02 DIAGNOSIS — R60.0 LOCALIZED EDEMA: ICD-10-CM

## 2024-05-02 DIAGNOSIS — I49.5 SICK SINUS SYNDROME: ICD-10-CM

## 2024-05-02 DIAGNOSIS — I63.9 CEREBRAL INFARCTION, UNSPECIFIED: ICD-10-CM

## 2024-05-02 DIAGNOSIS — M75.41 IMPINGEMENT SYNDROME OF RIGHT SHOULDER: ICD-10-CM

## 2024-05-02 DIAGNOSIS — Z86.73 PERSONAL HISTORY OF TRANSIENT ISCHEMIC ATTACK (TIA), AND CEREBRAL INFARCTION W/OUT RESIDUAL DEFICITS: ICD-10-CM

## 2024-05-02 DIAGNOSIS — I50.9 HEART FAILURE, UNSPECIFIED: ICD-10-CM

## 2024-05-02 DIAGNOSIS — R09.82 POSTNASAL DRIP: ICD-10-CM

## 2024-05-02 DIAGNOSIS — M67.911 UNSPECIFIED DISORDER OF SYNOVIUM AND TENDON, RIGHT SHOULDER: ICD-10-CM

## 2024-05-02 PROCEDURE — 99349 HOME/RES VST EST MOD MDM 40: CPT

## 2024-05-02 RX ORDER — FLUTICASONE PROPIONATE 50 UG/1
50 SPRAY, METERED NASAL
Qty: 16 | Refills: 0 | Status: ACTIVE | COMMUNITY
Start: 2020-03-06 | End: 1900-01-01

## 2024-05-02 NOTE — REVIEW OF SYSTEMS
[Earache] : no earache [Hearing Loss] : no hearing loss [Nosebleed] : no nosebleeds [Hoarseness] : no hoarseness [Nasal Discharge] : no nasal discharge [Sore Throat] : no sore throat [Postnasal Drip] : postnasal drip [Negative] : Heme/Lymph

## 2024-05-02 NOTE — HISTORY OF PRESENT ILLNESS
[House Calls Co-Management Patient] : [unfilled] is a House Calls co-management patient [Referred] : has been referred for aide services [Patient is stable] : patient is stable [Education provided] : education provided [LastPCPVisitDate] : 04/24 [FreeTextEntry4] : Dr. Perez (cardiology), pulmonology, orthopedic  [Patient] : patient [FreeTextEntry1] : Not homebound [FreeTextEntry2] : 05/02/2024 COVID SCREEN: Patient or caretaker denies fever, cough, trouble breathing, rash, vomiting. Patient has not been in close contact with anyone who is COVID-19 positive, or suspected of having COVID-19.  N95 mask, gloves, eye wear and gown (if indicated) used during visit: Yes.  Total face to face time with patient is 45 min.  DAVIN VALLEJO is an 88 year with PMHx  Hypothyroidism, pacemaker, A-fib with RVR s/p cardioversion, HTN, Asthma, CHF, CVA no residual, Peptic Ulcer, HUANG, Unsteady gait, osteopenia, Sarcoidosis, Polio, Cataract, Diverticulitis, Aortic Valve insufficiency. Falls, Glaucoma seen today for follow-up  home visit.   Patient was with her pet (dog) during the visit.   Patient's last visit to hospital was ER visit on 2/2/24 for A-fib with RVR s/p cardioversion. Patient was discharge home same day.  Patient reports no weight loss >10 lbs in the past 6 months. No changes in dentition or swallowing reported, No changes in hearing or vision reported. No changes in Cognition reported. Patient denies any symptoms of depression or anxiety. Patient is ADL independent/dependent and IADL independent/dependent. No changes in gait or falls reported.  Patient's home environment is safe.   Goals of care discussed 10mins. DNR/DNI with limitation

## 2024-05-02 NOTE — HEALTH RISK ASSESSMENT
[HRA Reviewed] : Health risk assessment reviewed [FreeTextEntry8] : use cane [Independent] : using telephone [Some assistance needed] : managing finances [Two or more falls in past year] : Patient reported two or more falls in the past year [Yes] : The patient does have visual impairment [TimeGetUpGo] : 4 [de-identified] : unsteady gait, cane assistive device outside home

## 2024-05-02 NOTE — REASON FOR VISIT
[Follow-Up] : a follow-up visit [Other: _____] : [unfilled] [Pre-Visit Preparation] : pre-visit preparation was done [Intercurrent Specialty/Sub-specialty Visits] : the patient has intercurrent specialty/sub-specialty visits [FreeTextEntry2] : Chart review [FreeTextEntry3] : Cardiology, orthopedic, pulmonology, endocrinology,

## 2024-05-02 NOTE — PHYSICAL EXAM
[No Acute Distress] : no acute distress [Normal Voice/Communication] : normal voice communication [Normal Sclera/Conjunctiva] : normal sclera/conjunctiva [EOMI] : extra ocular movement intact [Normal Outer Ear/Nose] : the ears and nose were normal in appearance [Normal TMs] : both tympanic membranes were normal [No JVD] : no jugular venous distention [No LAD] : no lymphadenopathy [No Respiratory Distress] : no respiratory distress [Clear to Auscultation] : lungs were clear to auscultation bilaterally [No Accessory Muscle Use] : no accessory muscle use [Normal Rate] : heart rate was normal  [Regular Rhythm] : with a regular rhythm [Normal S1, S2] : normal S1 and S2 [No Murmurs] : no murmurs heard [Breast Exam Declined] : patient declined to have breast exam done [Normal Bowel Sounds] : normal bowel sounds [Non Tender] : non-tender [Soft] : abdomen soft [Not Distended] : not distended [Patient Refused] : rectal exam was refused by the patient [Normal Post Cervical Nodes] : no posterior cervical lymphadenopathy [Normal Anterior Cervical Nodes] : no anterior cervical lymphadenopathy [No CVA Tenderness] : no ~M costovertebral angle tenderness [No Joint Swelling] : no joint swelling seen [No Rash] : no rash [No Skin Lesions] : no skin lesions [Cranial Nerves Intact] : cranial nerves 2-12 were intact [Oriented x3] : oriented to person, place, and time [Over the Past 2 Weeks, Have You Felt Down, Depressed, or Hopeless?] : 1.) Over the past 2 weeks, have you felt down, depressed, or hopeless? No [Over the Past 2 Weeks, Have You Felt Little Interest or Pleasure Doing Things?] : 2.) Over the past 2 weeks, have you felt little interest or pleasure doing things? No [Foot Ulcers] : no foot ulcers [de-identified] : patient refused [de-identified] : unsteady gait

## 2024-05-02 NOTE — CHRONIC CARE ASSESSMENT
[Inadequate social support] : inadequate social support [Less than 3 Times Per Week] : exercises less than 3 times per week [< 30 Minutes/Session] : (< 30 minutes per session) [Walking] : walking [Low Fat Diet] : low fat [Low Carb Diet] : low carbohydrate [Low Salt Diet] : low salt [General Adherence] : and is generally adherent [de-identified] : Extensive cardiac history (HTN, CHF, A-fib with RVR s/p cardioversion, aortic valve insufficiency), sarcoidosis of lung, Glaucoma [de-identified] : walks as tolerated  [de-identified] : low fat, low carb, low sodium [PPS Score: ____] : Palliative Performance Scale (PPS) Score: [unfilled]

## 2024-05-02 NOTE — COUNSELING
[Overweight - ( BMI 25.1 - 29.9 )] : overweight -  ( BMI 25.1 - 29.9 ) [Sodium restriction 2gm recommended] : sodium restriction 2 gm recommended [Non - Smoker] : non-smoker [Smoke/CO Detectors] : smoke/CO detectors [Use grab bars] : use grab bars [Use assistive device to avoid falls] : use assistive device to avoid falls [Remove clutter and unsafe carpeting to avoid falls] : remove clutter and unsafe carpeting to avoid falls [] : foot exam [Patient not on disease-modifying anti-rheumatic drug due to overall prognosis] : Patient not on disease-modifying anti-rheumatic drug due to overall prognosis [Not Recommended] : Aspirin use not recommended due to overall prognosis [FreeTextEntry3] : CHF cardiac  diet [Decrease hospital use] : decrease hospital use [Minimize unnecessary interventions] : minimize unnecessary interventions [Maintain functional ability] : maintain functional ability [Discussed disease trajectory with patient/caregiver] : discussed disease trajectory with patient/caregiver [DNR] : Code Status: DNR [Limited] : Treatment Guidelines: Limited [DNI] : Intubation: DNI

## 2024-05-30 ENCOUNTER — APPOINTMENT (OUTPATIENT)
Dept: ORTHOPEDIC SURGERY | Facility: CLINIC | Age: 89
End: 2024-05-30

## 2024-06-06 ENCOUNTER — APPOINTMENT (OUTPATIENT)
Dept: ENDOCRINOLOGY | Facility: CLINIC | Age: 89
End: 2024-06-06
Payer: MEDICARE

## 2024-06-06 VITALS
WEIGHT: 140 LBS | SYSTOLIC BLOOD PRESSURE: 179 MMHG | BODY MASS INDEX: 27.34 KG/M2 | HEART RATE: 79 BPM | DIASTOLIC BLOOD PRESSURE: 85 MMHG

## 2024-06-06 DIAGNOSIS — E03.9 HYPOTHYROIDISM, UNSPECIFIED: ICD-10-CM

## 2024-06-06 PROCEDURE — 99214 OFFICE O/P EST MOD 30 MIN: CPT | Mod: 25

## 2024-06-06 NOTE — HISTORY OF PRESENT ILLNESS
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   Currently on Levothyroxine 100 mcg daily  Most recent TSH of 15.2 ( 03/22- changed from Levothyroxine 75 mcg -->100 mcg). Previous TSH were near normal.   Recent cardioversion in 02/2024 and since then has been on amiodarone since then ( De-escalating doses from 400 mg--> now down to 100 mg)  Symptoms:  -Normal bowel movements  -Weight is stable  -No hair loss  -No brain fog  -Normal energy levels  -Increased sleepiness

## 2024-06-06 NOTE — ASSESSMENT
[FreeTextEntry1] : 88-year-old female with history of sarcoidosis, Irregular heart rhythm on amiodarone with recent cardioversion, HTN here for f/u of recently increasing TSH.  Likely 2/2 amiodarone introduction.  Levothyroxine dose was increased 4 weeks prior, will check TFTs again   Hypothyroidism: -Goal TSH is slightly above the upper limit of normal given cardiac disease and age -Continue LT4 88 mcg for now -Check TFTs now -Will be called back with results -Follow up in 6 months

## 2024-06-06 NOTE — REVIEW OF SYSTEMS
[Fatigue] : no fatigue [Decreased Appetite] : appetite not decreased [Recent Weight Gain (___ Lbs)] : no recent weight gain [Recent Weight Loss (___ Lbs)] : no recent weight loss [Visual Field Defect] : no visual field defect [Dry Eyes] : no dryness [Dysphagia] : no dysphagia [Neck Pain] : no neck pain [Dysphonia] : no dysphonia [Nasal Congestion] : no nasal congestion [Chest Pain] : no chest pain [Slow Heart Rate] : heart rate is not slow [Palpitations] : no palpitations [Fast Heart Rate] : heart rate is not fast [Shortness Of Breath] : no shortness of breath [Cough] : no cough [Nausea] : no nausea [Constipation] : no constipation [Vomiting] : no vomiting [Diarrhea] : no diarrhea [Polyuria] : no polyuria [Irregular Menses] : regular menses [Joint Pain] : no joint pain [Tremors] : no tremors [Depression] : no depression [Polydipsia] : no polydipsia [Cold Intolerance] : no cold intolerance [Easy Bleeding] : no ~M tendency for easy bleeding [Easy Bruising] : no tendency for easy bruising

## 2024-06-07 LAB
T4 FREE SERPL-MCNC: 2.4 NG/DL
TSH SERPL-ACNC: 1.62 UIU/ML

## 2024-06-10 NOTE — ASSESSMENT
[FreeTextEntry1] : DAVIN VALLEJO is a 88 year old female with     I discussed with the patient that their symptoms, signs, and imaging are most consistent with  **.  We reviewed the natural history of this condition and treatment options. We agreed on the following plan:  Encouraged to continue home exercises per handout. Continue physical therapy. Recommend 150 min per week of moderate intensity aerobic activity  Medication:    prescription provided. Imaging: Follow up in 6-8 weeks.

## 2024-06-10 NOTE — HISTORY OF PRESENT ILLNESS
[de-identified] : DAVIN VALLEJO is a 88 year old female presents to follow up Last visit was 04/15/24

## 2024-06-10 NOTE — HISTORY OF PRESENT ILLNESS
[de-identified] : DAVIN VALLEJO is a 88 year old female presents to follow up Last visit was 04/15/24

## 2024-06-17 RX ORDER — METOPROLOL SUCCINATE 50 MG/1
50 TABLET, EXTENDED RELEASE ORAL
Qty: 270 | Refills: 0 | Status: ACTIVE | COMMUNITY
Start: 2017-10-07 | End: 1900-01-01

## 2024-06-28 NOTE — PATIENT PROFILE ADULT - CENTRAL VENOUS CATHETER/PICC LINE
Abdomen: soft and nondistended, no visible masses, nontender to palpation, no guarding or rigidity, no rebound tenderness, no palpable masses, normal bowel sounds. Liver and Spleen: no hepatosplenomegaly, liver nontender. Rectal: Deferred no

## 2024-07-02 ENCOUNTER — EMERGENCY (EMERGENCY)
Facility: HOSPITAL | Age: 89
LOS: 1 days | Discharge: HOME | End: 2024-07-02
Attending: STUDENT IN AN ORGANIZED HEALTH CARE EDUCATION/TRAINING PROGRAM | Admitting: STUDENT IN AN ORGANIZED HEALTH CARE EDUCATION/TRAINING PROGRAM
Payer: MEDICARE

## 2024-07-02 ENCOUNTER — NON-APPOINTMENT (OUTPATIENT)
Age: 89
End: 2024-07-02

## 2024-07-02 VITALS
OXYGEN SATURATION: 99 % | TEMPERATURE: 98 F | WEIGHT: 139.99 LBS | SYSTOLIC BLOOD PRESSURE: 183 MMHG | DIASTOLIC BLOOD PRESSURE: 90 MMHG | RESPIRATION RATE: 18 BRPM | HEART RATE: 79 BPM

## 2024-07-02 VITALS
TEMPERATURE: 98 F | RESPIRATION RATE: 18 BRPM | HEART RATE: 66 BPM | OXYGEN SATURATION: 100 % | DIASTOLIC BLOOD PRESSURE: 72 MMHG | SYSTOLIC BLOOD PRESSURE: 154 MMHG

## 2024-07-02 PROCEDURE — 70450 CT HEAD/BRAIN W/O DYE: CPT | Mod: 26,MC

## 2024-07-02 PROCEDURE — 99284 EMERGENCY DEPT VISIT MOD MDM: CPT | Mod: 25

## 2024-07-02 PROCEDURE — 70450 CT HEAD/BRAIN W/O DYE: CPT | Mod: MC

## 2024-07-02 PROCEDURE — 99284 EMERGENCY DEPT VISIT MOD MDM: CPT

## 2024-07-04 DIAGNOSIS — I25.10 ATHEROSCLEROTIC HEART DISEASE OF NATIVE CORONARY ARTERY WITHOUT ANGINA PECTORIS: ICD-10-CM

## 2024-07-04 DIAGNOSIS — I10 ESSENTIAL (PRIMARY) HYPERTENSION: ICD-10-CM

## 2024-07-04 DIAGNOSIS — S09.90XA UNSPECIFIED INJURY OF HEAD, INITIAL ENCOUNTER: ICD-10-CM

## 2024-07-04 DIAGNOSIS — Z88.5 ALLERGY STATUS TO NARCOTIC AGENT: ICD-10-CM
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
## 2024-07-04 DIAGNOSIS — Z86.73 PERSONAL HISTORY OF TRANSIENT ISCHEMIC ATTACK (TIA), AND CEREBRAL INFARCTION WITHOUT RESIDUAL DEFICITS: ICD-10-CM
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
## 2024-07-04 DIAGNOSIS — H53.8 OTHER VISUAL DISTURBANCES: ICD-10-CM

## 2024-07-04 DIAGNOSIS — Z91.040 LATEX ALLERGY STATUS: ICD-10-CM

## 2024-07-05 ENCOUNTER — APPOINTMENT (OUTPATIENT)
Dept: HOME HEALTH SERVICES | Facility: HOME HEALTH | Age: 89
End: 2024-07-05
Payer: MEDICARE

## 2024-07-05 ENCOUNTER — APPOINTMENT (OUTPATIENT)
Dept: HEART AND VASCULAR | Facility: CLINIC | Age: 89
End: 2024-07-05

## 2024-07-05 DIAGNOSIS — Z65.8 OTHER SPECIFIED PROBLEMS RELATED TO PSYCHOSOCIAL CIRCUMSTANCES: ICD-10-CM

## 2024-07-05 DIAGNOSIS — Z95.0 PRESENCE OF CARDIAC PACEMAKER: ICD-10-CM

## 2024-07-05 DIAGNOSIS — I63.9 CEREBRAL INFARCTION, UNSPECIFIED: ICD-10-CM

## 2024-07-05 DIAGNOSIS — D86.0 SARCOIDOSIS OF LUNG: ICD-10-CM

## 2024-07-05 DIAGNOSIS — M19.90 UNSPECIFIED OSTEOARTHRITIS, UNSPECIFIED SITE: ICD-10-CM

## 2024-07-05 DIAGNOSIS — I50.9 HEART FAILURE, UNSPECIFIED: ICD-10-CM

## 2024-07-05 DIAGNOSIS — Z87.39 PERSONAL HISTORY OF OTHER DISEASES OF THE MUSCULOSKELETAL SYSTEM AND CONNECTIVE TISSUE: ICD-10-CM

## 2024-07-05 DIAGNOSIS — Z87.898 PERSONAL HISTORY OF OTHER SPECIFIED CONDITIONS: ICD-10-CM

## 2024-07-05 DIAGNOSIS — I25.10 ATHEROSCLEROTIC HEART DISEASE OF NATIVE CORONARY ARTERY W/OUT ANGINA PECTORIS: ICD-10-CM

## 2024-07-05 DIAGNOSIS — R26.81 UNSTEADINESS ON FEET: ICD-10-CM

## 2024-07-05 DIAGNOSIS — S00.03XA CONTUSION OF SCALP, INITIAL ENCOUNTER: ICD-10-CM

## 2024-07-05 DIAGNOSIS — H54.7 UNSPECIFIED VISUAL LOSS: ICD-10-CM
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
## 2024-07-05 DIAGNOSIS — M19.072 PRIMARY OSTEOARTHRITIS, RIGHT ANKLE AND FOOT: ICD-10-CM

## 2024-07-05 DIAGNOSIS — M19.071 PRIMARY OSTEOARTHRITIS, RIGHT ANKLE AND FOOT: ICD-10-CM

## 2024-07-05 DIAGNOSIS — I48.91 UNSPECIFIED ATRIAL FIBRILLATION: ICD-10-CM

## 2024-07-05 DIAGNOSIS — I49.5 SICK SINUS SYNDROME: ICD-10-CM

## 2024-07-05 PROCEDURE — 99349 HOME/RES VST EST MOD MDM 40: CPT

## 2024-07-05 PROCEDURE — 93296 REM INTERROG EVL PM/IDS: CPT

## 2024-07-05 PROCEDURE — 93294 REM INTERROG EVL PM/LDLS PM: CPT

## 2024-07-06 VITALS
HEIGHT: 60 IN | DIASTOLIC BLOOD PRESSURE: 68 MMHG | HEART RATE: 70 BPM | RESPIRATION RATE: 20 BRPM | OXYGEN SATURATION: 97 % | SYSTOLIC BLOOD PRESSURE: 142 MMHG | TEMPERATURE: 97.9 F

## 2024-07-06 PROBLEM — H54.7 DECREASED VISION: Status: ACTIVE | Noted: 2024-07-06

## 2024-07-06 PROBLEM — S00.03XA: Status: RESOLVED | Noted: 2024-07-06 | Resolved: 2024-07-06

## 2024-07-26 ENCOUNTER — APPOINTMENT (OUTPATIENT)
Dept: HOME HEALTH SERVICES | Facility: HOME HEALTH | Age: 89
End: 2024-07-26
Payer: MEDICARE

## 2024-07-26 VITALS
TEMPERATURE: 98.4 F | HEART RATE: 57 BPM | DIASTOLIC BLOOD PRESSURE: 76 MMHG | SYSTOLIC BLOOD PRESSURE: 124 MMHG | HEIGHT: 60 IN | RESPIRATION RATE: 20 BRPM | OXYGEN SATURATION: 97 %

## 2024-07-26 DIAGNOSIS — M19.071 PRIMARY OSTEOARTHRITIS, RIGHT ANKLE AND FOOT: ICD-10-CM

## 2024-07-26 DIAGNOSIS — I50.9 HEART FAILURE, UNSPECIFIED: ICD-10-CM

## 2024-07-26 DIAGNOSIS — D86.0 SARCOIDOSIS OF LUNG: ICD-10-CM

## 2024-07-26 DIAGNOSIS — M19.072 PRIMARY OSTEOARTHRITIS, RIGHT ANKLE AND FOOT: ICD-10-CM

## 2024-07-26 DIAGNOSIS — I48.91 UNSPECIFIED ATRIAL FIBRILLATION: ICD-10-CM

## 2024-07-26 DIAGNOSIS — Z95.0 PRESENCE OF CARDIAC PACEMAKER: ICD-10-CM

## 2024-07-26 DIAGNOSIS — I25.10 ATHEROSCLEROTIC HEART DISEASE OF NATIVE CORONARY ARTERY W/OUT ANGINA PECTORIS: ICD-10-CM

## 2024-07-26 DIAGNOSIS — Z86.73 PERSONAL HISTORY OF TRANSIENT ISCHEMIC ATTACK (TIA), AND CEREBRAL INFARCTION W/OUT RESIDUAL DEFICITS: ICD-10-CM

## 2024-07-26 DIAGNOSIS — Z91.81 HISTORY OF FALLING: ICD-10-CM

## 2024-07-26 DIAGNOSIS — I49.5 SICK SINUS SYNDROME: ICD-10-CM

## 2024-07-26 PROCEDURE — 99349 HOME/RES VST EST MOD MDM 40: CPT

## 2024-07-26 NOTE — HEALTH RISK ASSESSMENT
[Independent] : using telephone [Two or more falls in past year] : Patient reported two or more falls in the past year [Yes] : The patient does have visual impairment [HRA Reviewed] : Health risk assessment reviewed [Some assistance needed] : bathing [FreeTextEntry8] : use cane [TimeGetUpGo] : 6 [de-identified] : unsteady gait, uses cane at home and outside

## 2024-07-26 NOTE — REASON FOR VISIT
[Follow-Up] : a follow-up visit [Other: _____] : [unfilled] [Pre-Visit Preparation] : pre-visit preparation was done [Intercurrent Specialty/Sub-specialty Visits] : the patient has intercurrent specialty/sub-specialty visits [FreeTextEntry1] : Hypothyroidism, pacemaker, A-fib with RVR s/p cardioversion, HTN, Asthma, CHF, CVA no residual, Peptic Ulcer, HUANG, Unsteady gait, osteopenia, Sarcoidosis, Polio, Cataract, Diverticulitis, Aortic Valve insufficiency. Falls, hematoma of scalp, decrease vision, Glaucoma [FreeTextEntry2] : Chart review [FreeTextEntry3] : Cardiology, orthopedic, pulmonology, endocrinology, ophthalmology

## 2024-07-26 NOTE — REVIEW OF SYSTEMS
[Vision Problems] : vision problems [Joint Pain] : joint pain [Unsteady Walking] : ataxia [Negative] : Heme/Lymph [Discharge] : no discharge [Pain] : no pain [Redness] : no redness [Dryness] : no dryness  [Itching] : no itching [Joint Stiffness] : no joint stiffness [Joint Swelling] : no joint swelling [Muscle Weakness] : no muscle weakness [Muscle Pain] : no muscle pain [Back Pain] : no back pain [Headache] : no headache [Dizziness] : no dizziness [Fainting] : no fainting [Confusion] : no confusion [Memory Loss] : no memory loss

## 2024-07-26 NOTE — HISTORY OF PRESENT ILLNESS
[House Calls Co-Management Patient] : [unfilled] is a House Calls co-management patient [In-Place] : has aide services in-place [Patient is stable] : patient is stable [Education provided] : education provided [Patient] : patient [LastPCPVisitDate] : 04/24 [FreeTextEntry4] : Dr. Perez (cardiology), pulmonology,  orthopedic, opthalmology, [FreeTextEntry1] : Not homebound [FreeTextEntry2] : 07/06/2024 COVID SCREEN: Patient or caretaker denies fever, cough, trouble breathing, rash, vomiting. Patient has not been in close contact with anyone who is COVID-19 positive, or suspected of having COVID-19.  N95 mask, gloves, eye wear and gown (if indicated) used during visit: Yes.  Total face to face time with patient is 45 min.  DAVIN VALLEJO is an 88 year with PMHx  Hypothyroidism, pacemaker, A-fib with RVR s/p cardioversion, HTN, Asthma, CHF, CVA no residual, Peptic Ulcer, HUANG, Unsteady gait, osteopenia, Sarcoidosis, Polio, Cataract, Diverticulitis, Aortic Valve insufficiency. Falls, hematoma of scalp, decrease vision, Glaucoma seen for follow up home visit.   Patient was with her pet (dog) during the visit.   Last hospitalization was ER visit at [facility] on 7/2/24 due to head trauma s/p fall, decrease vision. Patient was discharged home same day on 7/2/24.    Patient reports health at baseline. She is having PT twice a weel, helping with arthritis pain. Patient denies chest pain, distress, dizziness, palpitations, headache, n/v.   #Atrial Fibrillation Rate controlled   Continue Metoprolol, Amiodarone and Pradaxa following with cardiology #Congestive heart failure (CHF) compensated Continue furosemide discussed CHF management  by restriction of fluid intake, 2g sodium diet, daily weight following with cardiology #Artificial cardiac pacemaker device is functioning appropriately as programmed and all measured data is WNL.   patient is enrolled in remote monitoring  following with cardiology   #Sarcoidosis of Lung Controlled on medication continue medications Albuterol sulfate, Symbicort Following with pulmonology #Sick sinus syndrome S/P PPM placement. slow heart rate 57, asymptomatic Following with cardiologist #Primary osteoarthritis of both ankles Tylenol PRN Continue PT/OT  Patient reports no weight loss >10 lbs in the past 6 months. No changes in dentition or swallowing reported, No changes in hearing. Reports worsening in vision. No changes in Cognition reported. Patient denies any symptoms of depression or anxiety. Patient is ADL independent/dependent and IADL independent/dependent. No changes in gait.  Recent falls with injury reported.  Patient's home environment is safe.   Goals of care discussed 10mins. DNR/DNI with limitation

## 2024-07-26 NOTE — COUNSELING
[Not Recommended] : Aspirin use not recommended due to overall prognosis [DNR] : Code Status: DNR [Limited] : Treatment Guidelines: Limited [DNI] : Intubation: DNI [Overweight - ( BMI 25.1 - 29.9 )] : overweight -  ( BMI 25.1 - 29.9 ) [DASH diet recommended] : DASH diet recommended [Sodium restriction 2gm recommended] : sodium restriction 2 gm recommended [Non - Smoker] : non-smoker [Smoke/CO Detectors] : smoke/CO detectors [Use grab bars] : use grab bars [Use assistive device to avoid falls] : use assistive device to avoid falls [Remove clutter and unsafe carpeting to avoid falls] : remove clutter and unsafe carpeting to avoid falls [] : foot exam [Patient not on disease-modifying anti-rheumatic drug due to overall prognosis] : Patient not on disease-modifying anti-rheumatic drug due to overall prognosis [FreeTextEntry3] : CHF cardiac  diet [Decrease hospital use] : decrease hospital use [Minimize unnecessary interventions] : minimize unnecessary interventions [Maintain functional ability] : maintain functional ability [Discussed disease trajectory with patient/caregiver] : discussed disease trajectory with patient/caregiver

## 2024-07-26 NOTE — PHYSICAL EXAM
[No Acute Distress] : no acute distress [Normal Voice/Communication] : normal voice communication [Normal Sclera/Conjunctiva] : normal sclera/conjunctiva [EOMI] : extra ocular movement intact [Normal Outer Ear/Nose] : the ears and nose were normal in appearance [Normal TMs] : both tympanic membranes were normal [No JVD] : no jugular venous distention [No LAD] : no lymphadenopathy [No Respiratory Distress] : no respiratory distress [Clear to Auscultation] : lungs were clear to auscultation bilaterally [No Accessory Muscle Use] : no accessory muscle use [Normal Rate] : heart rate was normal  [Normal S1, S2] : normal S1 and S2 [No Murmurs] : no murmurs heard [No Edema] : there was no peripheral edema [Normal Bowel Sounds] : normal bowel sounds [Non Tender] : non-tender [Soft] : abdomen soft [Not Distended] : not distended [Normal Post Cervical Nodes] : no posterior cervical lymphadenopathy [Normal Anterior Cervical Nodes] : no anterior cervical lymphadenopathy [No CVA Tenderness] : no ~M costovertebral angle tenderness [No Joint Swelling] : no joint swelling seen [No Rash] : no rash [No Skin Lesions] : no skin lesions [Cranial Nerves Intact] : cranial nerves 2-12 were intact [Oriented x3] : oriented to person, place, and time [Over the Past 2 Weeks, Have You Felt Down, Depressed, or Hopeless?] : 1.) Over the past 2 weeks, have you felt down, depressed, or hopeless? No [Over the Past 2 Weeks, Have You Felt Little Interest or Pleasure Doing Things?] : 2.) Over the past 2 weeks, have you felt little interest or pleasure doing things? No [Foot Ulcers] : no foot ulcers [de-identified] : unsteady gait

## 2024-07-26 NOTE — CHRONIC CARE ASSESSMENT
[< 30 Minutes/Session] : (< 30 minutes per session) [General Adherence] : and is generally adherent [Inadequate social support] : inadequate social support [___ Times Per Week] : exercises [unfilled] times per week [Walking] : walking [Low Fat Diet] : low fat [Low Carb Diet] : low carbohydrate [Low Salt Diet] : low salt [PPS Score: ____] : Palliative Performance Scale (PPS) Score: [unfilled] [de-identified] : Extensive cardiac history (HTN, CHF, A-fib with RVR s/p cardioversion, aortic valve insufficiency), sarcoidosis of lung, Glaucoma, falls [de-identified] : walks as tolerated with a cane [de-identified] : low fat, low carb, low sodium

## 2024-08-23 ENCOUNTER — APPOINTMENT (OUTPATIENT)
Dept: HOME HEALTH SERVICES | Facility: HOME HEALTH | Age: 89
End: 2024-08-23

## 2024-08-23 ENCOUNTER — NON-APPOINTMENT (OUTPATIENT)
Age: 89
End: 2024-08-23

## 2024-08-29 ENCOUNTER — APPOINTMENT (OUTPATIENT)
Dept: HOME HEALTH SERVICES | Facility: HOME HEALTH | Age: 89
End: 2024-08-29
Payer: MEDICARE

## 2024-08-29 VITALS
TEMPERATURE: 97.2 F | HEART RATE: 55 BPM | SYSTOLIC BLOOD PRESSURE: 136 MMHG | HEIGHT: 60 IN | RESPIRATION RATE: 20 BRPM | OXYGEN SATURATION: 99 % | DIASTOLIC BLOOD PRESSURE: 64 MMHG

## 2024-08-29 DIAGNOSIS — D86.0 SARCOIDOSIS OF LUNG: ICD-10-CM
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
## 2024-08-29 DIAGNOSIS — R26.81 UNSTEADINESS ON FEET: ICD-10-CM

## 2024-08-29 DIAGNOSIS — S02.40FA ZYGOMATIC FRACTURE, LEFT SIDE, INITIAL ENCOUNTER FOR CLOSED FRACTURE: ICD-10-CM

## 2024-08-29 DIAGNOSIS — I49.5 SICK SINUS SYNDROME: ICD-10-CM

## 2024-08-29 DIAGNOSIS — E03.9 HYPOTHYROIDISM, UNSPECIFIED: ICD-10-CM

## 2024-08-29 DIAGNOSIS — I50.9 HEART FAILURE, UNSPECIFIED: ICD-10-CM

## 2024-08-29 DIAGNOSIS — Z60.2 PROBLEMS RELATED TO LIVING ALONE: ICD-10-CM

## 2024-08-29 DIAGNOSIS — I48.91 UNSPECIFIED ATRIAL FIBRILLATION: ICD-10-CM

## 2024-08-29 PROCEDURE — 99349 HOME/RES VST EST MOD MDM 40: CPT

## 2024-08-29 SDOH — SOCIAL STABILITY - SOCIAL INSECURITY: PROBLEMS RELATED TO LIVING ALONE: Z60.2

## 2024-08-29 NOTE — COUNSELING
[] : foot exam [Not Recommended] : Aspirin use not recommended due to overall prognosis [DNR] : Code Status: DNR [Limited] : Treatment Guidelines: Limited [DNI] : Intubation: DNI [Overweight - ( BMI 25.1 - 29.9 )] : overweight -  ( BMI 25.1 - 29.9 ) [DASH diet recommended] : DASH diet recommended [Sodium restriction 2gm recommended] : sodium restriction 2 gm recommended [Non - Smoker] : non-smoker [Smoke/CO Detectors] : smoke/CO detectors [Use grab bars] : use grab bars [Use assistive device to avoid falls] : use assistive device to avoid falls [Remove clutter and unsafe carpeting to avoid falls] : remove clutter and unsafe carpeting to avoid falls [Patient not on disease-modifying anti-rheumatic drug due to overall prognosis] : Patient not on disease-modifying anti-rheumatic drug due to overall prognosis [Decrease hospital use] : decrease hospital use [Minimize unnecessary interventions] : minimize unnecessary interventions [Maintain functional ability] : maintain functional ability [Discussed disease trajectory with patient/caregiver] : discussed disease trajectory with patient/caregiver [FreeTextEntry3] : CHF cardiac  diet

## 2024-08-29 NOTE — HEALTH RISK ASSESSMENT
[HRA Reviewed] : Health risk assessment reviewed [Independent] : using telephone [Some assistance needed] : managing finances [Two or more falls in past year] : Patient reported two or more falls in the past year [Yes] : The patient does have visual impairment [FreeTextEntry8] : use cane [TimeGetUpGo] : 6 [de-identified] : unsteady gait, uses cane at home and outside

## 2024-08-29 NOTE — REVIEW OF SYSTEMS
[Negative] : Constitutional [Discharge] : no discharge [Pain] : no pain [Redness] : no redness [Dryness] : no dryness  [Itching] : no itching [Joint Stiffness] : no joint stiffness [Joint Swelling] : no joint swelling [Muscle Weakness] : no muscle weakness [Muscle Pain] : no muscle pain [Back Pain] : no back pain [Mole Changes] : no mole changes [Nail Changes] : no nail changes [Hair Changes] : no hair changes [Skin Rash] : no skin rash [Headache] : no headache [Dizziness] : no dizziness [Fainting] : no fainting [Confusion] : no confusion [Memory Loss] : no memory loss [de-identified] :   bruise to the face from s/p fall, left eyebrow with stitch

## 2024-08-29 NOTE — CHRONIC CARE ASSESSMENT
[Inadequate social support] : inadequate social support [___ Times Per Week] : exercises [unfilled] times per week [< 30 Minutes/Session] : (< 30 minutes per session) [Walking] : walking [Low Fat Diet] : low fat [Low Carb Diet] : low carbohydrate [Low Salt Diet] : low salt [General Adherence] : and is generally adherent [PPS Score: ____] : Palliative Performance Scale (PPS) Score: [unfilled] [de-identified] : Extensive cardiac history (HTN, CHF, A-fib with RVR s/p cardioversion, aortic valve insufficiency), sarcoidosis of lung, Glaucoma, falls [de-identified] : walks as tolerated with a cane [de-identified] : low fat, low carb, low sodium

## 2024-08-29 NOTE — HISTORY OF PRESENT ILLNESS
[[employer] Co-Management Patient] : [unfilled] is a [employer] co-management patient [Referred] : has been referred for aide services [Patient is stable] : patient is stable [Education provided] : education provided [Patient] : patient [FreeTextEntry4] : Dr. Perez (cardiology), pulmonology,  orthopedic, opthalmology, [FreeTextEntry1] : Not homebound [FreeTextEntry2] : 08/29/2024 COVID SCREEN: Patient or caretaker denies fever, cough, trouble breathing, rash, vomiting. Patient has not been in close contact with anyone who is COVID-19 positive, or suspected of having COVID-19.  N95 mask, gloves, eye wear and gown (if indicated) used during visit: Yes.  Total face to face time with patient is 45 min.  DAVIN VALLEJO is an 88 year with PMHx  Hypothyroidism, pacemaker, A-fib with RVR s/p cardioversion, HTN, Asthma, CHF, CVA no residual, Peptic Ulcer, HUANG, Unsteady gait, osteopenia, Sarcoidosis, Polio, Cataract, Diverticulitis, Aortic Valve insufficiency. Falls, hematoma of scalp, decrease vision, Glaucoma seen for acute home visit s/p ED discharge   Patient was with her pet (dog) during the visit.   Hospitalization  #10/20/23 admission to [facility] for mechanical fall and Covid 19. Patient was discharge home on 10/23/23. #ER visit at [facility] on 7/2/24 due to head trauma s/p fall, decrease vision. Patient was discharged home same day on 7/2/24.  #08/23/24 ED visit  at [facility] for S/p fall and discharge home on 08/23/24.   Problems #Multiple falls -need renewal for [facility] -Safety precaution maintain  #left zygomatic fracture  -Tylenol PRN #Artificial cardiac pacemaker  -following with cardiology for pacemaker check every 2months #Atherosclerosis of native coronary artery without angina pectoris  -Discussed lifestyle modification -continue pravastatin #Atrial fibrillation  -Continue Metoprolol, Amiodarone and Pradaxa -following with cardiology #CHF (congestive heart failure)  -compensated -Continue furosemide -CHF management on restriction of fluid intake, 2g sodium diet, daily weight  -following with cardiology #CVA (cerebral vascular accident)  - BP controlled on medication metoprolol #hypothyroidism -continue levothyroxine  #Degenerative joint disease  -PT/OT in placed #Sarcoidosis of lung  -continue medications Albuterol sulfate, Symbicort -Following with pulmonology #Sick sinus syndrome  -S/P PPM placement years ago -Following with cardiologist #Primary osteoarthritis of both ankle -Tylenol PRN -PT/OT in placed  Patient reports health at baseline. Patient denies chest pain, distress, dizziness, palpitations, headache, n/v.   Patient reports no weight loss >10 lbs in the past 6 months. No changes in dentition or swallowing reported, No changes in hearing. Reports worsening in vision. No changes in Cognition reported. Patient denies any symptoms of depression or anxiety. Patient is ADL independent/dependent and IADL independent/dependent. No changes in gait.  Recent multiple falls with injury reported.  Patient's home environment is safe.   Goals of care discussed 10mins. DNR/DNI with limitation

## 2024-08-29 NOTE — REASON FOR VISIT
[Acute] : an acute visit [Other: _____] : [unfilled] [Pre-Visit Preparation] : pre-visit preparation was done [Intercurrent Specialty/Sub-specialty Visits] : the patient has intercurrent specialty/sub-specialty visits [FreeTextEntry1] : Hypothyroidism, pacemaker, A-fib with RVR s/p cardioversion, HTN, Asthma, CHF, CVA no residual, Peptic Ulcer, HUANG,    Unsteady gait, osteopenia, Sarcoidosis, Polio, Cataract, Diverticulitis, Aortic Valve insufficiency. multiple Falls, decrease vision, Glaucoma [FreeTextEntry2] : Chart review [FreeTextEntry3] : Cardiology, orthopedic, pulmonology, endocrinology, ophthalmology

## 2024-08-29 NOTE — PHYSICAL EXAM
[No Acute Distress] : no acute distress [Normal Voice/Communication] : normal voice communication [Normal Sclera/Conjunctiva] : normal sclera/conjunctiva [EOMI] : extra ocular movement intact [Normal Outer Ear/Nose] : the ears and nose were normal in appearance [Normal TMs] : both tympanic membranes were normal [No JVD] : no jugular venous distention [No LAD] : no lymphadenopathy [No Respiratory Distress] : no respiratory distress [Clear to Auscultation] : lungs were clear to auscultation bilaterally [No Accessory Muscle Use] : no accessory muscle use [Normal Rate] : heart rate was normal  [Normal S1, S2] : normal S1 and S2 [No Murmurs] : no murmurs heard [No Edema] : there was no peripheral edema [Normal Bowel Sounds] : normal bowel sounds [Non Tender] : non-tender [Soft] : abdomen soft [Not Distended] : not distended [Normal Post Cervical Nodes] : no posterior cervical lymphadenopathy [Normal Anterior Cervical Nodes] : no anterior cervical lymphadenopathy [No CVA Tenderness] : no ~M costovertebral angle tenderness [No Joint Swelling] : no joint swelling seen [Cranial Nerves Intact] : cranial nerves 2-12 were intact [Oriented x3] : oriented to person, place, and time [Over the Past 2 Weeks, Have You Felt Down, Depressed, or Hopeless?] : 1.) Over the past 2 weeks, have you felt down, depressed, or hopeless? No [Over the Past 2 Weeks, Have You Felt Little Interest or Pleasure Doing Things?] : 2.) Over the past 2 weeks, have you felt little interest or pleasure doing things? No [Foot Ulcers] : no foot ulcers [de-identified] : Female patient was walking independently with unsteady gait, unlabored breathing on room air  [de-identified] : unsteady gait [de-identified] : bruises to the face, left eyebrow cut with stitches

## 2024-09-19 ENCOUNTER — APPOINTMENT (OUTPATIENT)
Dept: CT IMAGING | Facility: CLINIC | Age: 89
End: 2024-09-19
Payer: MEDICARE

## 2024-09-19 ENCOUNTER — RESULT REVIEW (OUTPATIENT)
Age: 89
End: 2024-09-19

## 2024-09-19 ENCOUNTER — APPOINTMENT (OUTPATIENT)
Dept: MAMMOGRAPHY | Facility: CLINIC | Age: 89
End: 2024-09-19

## 2024-09-19 ENCOUNTER — OUTPATIENT (OUTPATIENT)
Dept: OUTPATIENT SERVICES | Facility: HOSPITAL | Age: 89
LOS: 1 days | End: 2024-09-19

## 2024-09-19 ENCOUNTER — TRANSCRIPTION ENCOUNTER (OUTPATIENT)
Age: 89
End: 2024-09-19

## 2024-09-19 PROCEDURE — 77067 SCR MAMMO BI INCL CAD: CPT | Mod: 26

## 2024-09-19 PROCEDURE — 77063 BREAST TOMOSYNTHESIS BI: CPT | Mod: 26

## 2024-09-19 PROCEDURE — 71250 CT THORAX DX C-: CPT | Mod: 26

## 2024-09-23 ENCOUNTER — RX RENEWAL (OUTPATIENT)
Age: 89
End: 2024-09-23

## 2024-09-23 ENCOUNTER — NON-APPOINTMENT (OUTPATIENT)
Age: 89
End: 2024-09-23

## 2024-09-24 ENCOUNTER — TRANSCRIPTION ENCOUNTER (OUTPATIENT)
Age: 89
End: 2024-09-24

## 2024-10-03 ENCOUNTER — NON-APPOINTMENT (OUTPATIENT)
Age: 89
End: 2024-10-03

## 2024-10-04 ENCOUNTER — NON-APPOINTMENT (OUTPATIENT)
Age: 89
End: 2024-10-04

## 2024-10-04 ENCOUNTER — APPOINTMENT (OUTPATIENT)
Dept: HEART AND VASCULAR | Facility: CLINIC | Age: 89
End: 2024-10-04
Payer: MEDICARE

## 2024-10-04 PROCEDURE — 93296 REM INTERROG EVL PM/IDS: CPT

## 2024-10-04 PROCEDURE — 93294 REM INTERROG EVL PM/LDLS PM: CPT

## 2024-10-10 ENCOUNTER — APPOINTMENT (OUTPATIENT)
Dept: PULMONOLOGY | Facility: CLINIC | Age: 89
End: 2024-10-10
Payer: MEDICARE

## 2024-10-10 ENCOUNTER — NON-APPOINTMENT (OUTPATIENT)
Age: 89
End: 2024-10-10

## 2024-10-10 VITALS
HEART RATE: 62 BPM | RESPIRATION RATE: 12 BRPM | WEIGHT: 141 LBS | BODY MASS INDEX: 27.68 KG/M2 | TEMPERATURE: 97.9 F | OXYGEN SATURATION: 96 % | DIASTOLIC BLOOD PRESSURE: 73 MMHG | HEIGHT: 60 IN | SYSTOLIC BLOOD PRESSURE: 184 MMHG

## 2024-10-10 VITALS — SYSTOLIC BLOOD PRESSURE: 170 MMHG | DIASTOLIC BLOOD PRESSURE: 77 MMHG

## 2024-10-10 PROCEDURE — 94729 DIFFUSING CAPACITY: CPT

## 2024-10-10 PROCEDURE — 94060 EVALUATION OF WHEEZING: CPT

## 2024-10-10 PROCEDURE — ZZZZZ: CPT

## 2024-10-10 PROCEDURE — 94618 PULMONARY STRESS TESTING: CPT

## 2024-10-10 PROCEDURE — 94727 GAS DIL/WSHOT DETER LNG VOL: CPT

## 2024-10-10 PROCEDURE — 99214 OFFICE O/P EST MOD 30 MIN: CPT | Mod: 25

## 2024-10-11 LAB
25(OH)D3 SERPL-MCNC: 67.9 NG/ML
ALBUMIN SERPL ELPH-MCNC: 4.4 G/DL
ALP BLD-CCNC: 94 U/L
ALT SERPL-CCNC: 20 U/L
AMYLASE/CREAT SERPL: 50 U/L
ANION GAP SERPL CALC-SCNC: 17 MMOL/L
AST SERPL-CCNC: 24 U/L
BASOPHILS # BLD AUTO: 0.07 K/UL
BASOPHILS NFR BLD AUTO: 0.8 %
BILIRUB SERPL-MCNC: 0.8 MG/DL
BUN SERPL-MCNC: 13 MG/DL
CALCIUM SERPL-MCNC: 9.4 MG/DL
CHLORIDE SERPL-SCNC: 104 MMOL/L
CO2 SERPL-SCNC: 23 MMOL/L
CREAT SERPL-MCNC: 1.17 MG/DL
CRP SERPL-MCNC: 8 MG/L
DEPRECATED D DIMER PPP IA-ACNC: <150 NG/ML DDU
EGFR: 45 ML/MIN/1.73M2
EOSINOPHIL # BLD AUTO: 0.1 K/UL
EOSINOPHIL NFR BLD AUTO: 1.1 %
ERYTHROCYTE [SEDIMENTATION RATE] IN BLOOD BY WESTERGREN METHOD: 41 MM/HR
GLUCOSE SERPL-MCNC: 85 MG/DL
HCT VFR BLD CALC: 38.7 %
HGB BLD-MCNC: 12.4 G/DL
IMM GRANULOCYTES NFR BLD AUTO: 0.4 %
LYMPHOCYTES # BLD AUTO: 1.81 K/UL
LYMPHOCYTES NFR BLD AUTO: 19.9 %
MAN DIFF?: NORMAL
MCHC RBC-ENTMCNC: 30.5 PG
MCHC RBC-ENTMCNC: 32 GM/DL
MCV RBC AUTO: 95.3 FL
MONOCYTES # BLD AUTO: 1.01 K/UL
MONOCYTES NFR BLD AUTO: 11.1 %
NEUTROPHILS # BLD AUTO: 6.06 K/UL
NEUTROPHILS NFR BLD AUTO: 66.7 %
NT-PROBNP SERPL-MCNC: 2099 PG/ML
PLATELET # BLD AUTO: 279 K/UL
POTASSIUM SERPL-SCNC: 4.3 MMOL/L
PROT SERPL-MCNC: 7.4 G/DL
RBC # BLD: 4.06 M/UL
RBC # FLD: 15 %
SODIUM SERPL-SCNC: 145 MMOL/L
WBC # FLD AUTO: 9.09 K/UL

## 2024-10-14 LAB
ACE BLD-CCNC: 47 U/L
IL2 SERPL-MCNC: 671.9 PG/ML
LYSOZYME SERPL-MCNC: 9.2 UG/ML

## 2024-10-18 ENCOUNTER — RX RENEWAL (OUTPATIENT)
Age: 89
End: 2024-10-18

## 2024-10-18 ENCOUNTER — APPOINTMENT (OUTPATIENT)
Dept: HEART AND VASCULAR | Facility: CLINIC | Age: 89
End: 2024-10-18
Payer: MEDICARE

## 2024-10-18 VITALS
SYSTOLIC BLOOD PRESSURE: 181 MMHG | HEART RATE: 77 BPM | TEMPERATURE: 98.2 F | WEIGHT: 141 LBS | HEIGHT: 60 IN | OXYGEN SATURATION: 98 % | DIASTOLIC BLOOD PRESSURE: 75 MMHG | BODY MASS INDEX: 27.68 KG/M2

## 2024-10-18 DIAGNOSIS — I25.10 ATHEROSCLEROTIC HEART DISEASE OF NATIVE CORONARY ARTERY W/OUT ANGINA PECTORIS: ICD-10-CM

## 2024-10-18 DIAGNOSIS — D86.0 SARCOIDOSIS OF LUNG: ICD-10-CM

## 2024-10-18 DIAGNOSIS — I49.5 SICK SINUS SYNDROME: ICD-10-CM

## 2024-10-18 DIAGNOSIS — Z95.0 PRESENCE OF CARDIAC PACEMAKER: ICD-10-CM

## 2024-10-18 DIAGNOSIS — I10 ESSENTIAL (PRIMARY) HYPERTENSION: ICD-10-CM

## 2024-10-18 DIAGNOSIS — I50.9 HEART FAILURE, UNSPECIFIED: ICD-10-CM

## 2024-10-18 PROCEDURE — 99214 OFFICE O/P EST MOD 30 MIN: CPT | Mod: 25

## 2024-10-18 PROCEDURE — 93000 ELECTROCARDIOGRAM COMPLETE: CPT

## 2024-10-18 RX ORDER — FUROSEMIDE 20 MG/1
20 TABLET ORAL
Qty: 90 | Refills: 3 | Status: ACTIVE | COMMUNITY
Start: 2024-10-18 | End: 1900-01-01

## 2024-10-21 LAB
CHOLEST SERPL-MCNC: 173 MG/DL
ESTIMATED AVERAGE GLUCOSE: 114 MG/DL
HBA1C MFR BLD HPLC: 5.6 %
HDLC SERPL-MCNC: 47 MG/DL
LDLC SERPL CALC-MCNC: 109 MG/DL
NONHDLC SERPL-MCNC: 126 MG/DL
TRIGL SERPL-MCNC: 94 MG/DL
TSH SERPL-ACNC: 1.49 UIU/ML

## 2024-10-23 ENCOUNTER — NON-APPOINTMENT (OUTPATIENT)
Age: 89
End: 2024-10-23

## 2024-10-24 ENCOUNTER — APPOINTMENT (OUTPATIENT)
Dept: HEART AND VASCULAR | Facility: CLINIC | Age: 89
End: 2024-10-24
Payer: MEDICARE

## 2024-10-24 VITALS
HEART RATE: 79 BPM | BODY MASS INDEX: 26.7 KG/M2 | OXYGEN SATURATION: 96 % | HEIGHT: 60 IN | WEIGHT: 136 LBS | TEMPERATURE: 97.4 F | DIASTOLIC BLOOD PRESSURE: 79 MMHG | SYSTOLIC BLOOD PRESSURE: 160 MMHG

## 2024-10-24 PROCEDURE — 99204 OFFICE O/P NEW MOD 45 MIN: CPT

## 2024-11-14 ENCOUNTER — NON-APPOINTMENT (OUTPATIENT)
Age: 89
End: 2024-11-14

## 2024-11-15 ENCOUNTER — APPOINTMENT (OUTPATIENT)
Dept: HEART AND VASCULAR | Facility: CLINIC | Age: 89
End: 2024-11-15
Payer: MEDICARE

## 2024-11-15 VITALS
HEART RATE: 72 BPM | DIASTOLIC BLOOD PRESSURE: 73 MMHG | BODY MASS INDEX: 27.48 KG/M2 | HEIGHT: 60 IN | OXYGEN SATURATION: 96 % | TEMPERATURE: 97.9 F | SYSTOLIC BLOOD PRESSURE: 166 MMHG | WEIGHT: 140 LBS

## 2024-11-15 PROCEDURE — 99214 OFFICE O/P EST MOD 30 MIN: CPT

## 2024-11-18 LAB
ANION GAP SERPL CALC-SCNC: 13 MMOL/L
BUN SERPL-MCNC: 21 MG/DL
CALCIUM SERPL-MCNC: 9.4 MG/DL
CHLORIDE SERPL-SCNC: 106 MMOL/L
CO2 SERPL-SCNC: 26 MMOL/L
CREAT SERPL-MCNC: 1.29 MG/DL
EGFR: 40 ML/MIN/1.73M2
GLUCOSE SERPL-MCNC: 106 MG/DL
NT-PROBNP SERPL-MCNC: 1105 PG/ML
POTASSIUM SERPL-SCNC: 5.2 MMOL/L
SODIUM SERPL-SCNC: 144 MMOL/L

## 2024-11-18 NOTE — PATIENT PROFILE ADULT - NSFALLSECTIONLABEL_GEN_A_CORE
Results indicate normal bone mineral density, with increase on bone density in the femurs bilaterally. Repeat DEXA in 2-5 years.    Please inform the patient of the results and arrange the above.    Thank you,    Deion Smith MD  11/18/2024  4:44 PM 
.

## 2024-11-22 ENCOUNTER — APPOINTMENT (OUTPATIENT)
Dept: HOME HEALTH SERVICES | Facility: HOME HEALTH | Age: 89
End: 2024-11-22
Payer: MEDICARE

## 2024-11-22 VITALS
DIASTOLIC BLOOD PRESSURE: 76 MMHG | SYSTOLIC BLOOD PRESSURE: 124 MMHG | OXYGEN SATURATION: 97 % | HEART RATE: 63 BPM | TEMPERATURE: 97.2 F

## 2024-11-22 DIAGNOSIS — Z71.89 OTHER SPECIFIED COUNSELING: ICD-10-CM

## 2024-11-22 DIAGNOSIS — I49.5 SICK SINUS SYNDROME: ICD-10-CM

## 2024-11-22 DIAGNOSIS — Z23 ENCOUNTER FOR IMMUNIZATION: ICD-10-CM

## 2024-11-22 DIAGNOSIS — Z95.0 PRESENCE OF CARDIAC PACEMAKER: ICD-10-CM

## 2024-11-22 DIAGNOSIS — D86.0 SARCOIDOSIS OF LUNG: ICD-10-CM

## 2024-11-22 DIAGNOSIS — S02.40FA ZYGOMATIC FRACTURE, LEFT SIDE, INITIAL ENCOUNTER FOR CLOSED FRACTURE: ICD-10-CM

## 2024-11-22 DIAGNOSIS — I50.9 HEART FAILURE, UNSPECIFIED: ICD-10-CM

## 2024-11-22 DIAGNOSIS — I10 ESSENTIAL (PRIMARY) HYPERTENSION: ICD-10-CM

## 2024-11-22 DIAGNOSIS — I48.91 UNSPECIFIED ATRIAL FIBRILLATION: ICD-10-CM

## 2024-11-22 PROCEDURE — 99349 HOME/RES VST EST MOD MDM 40: CPT

## 2024-12-05 ENCOUNTER — APPOINTMENT (OUTPATIENT)
Dept: ENDOCRINOLOGY | Facility: CLINIC | Age: 88
End: 2024-12-05

## 2024-12-11 ENCOUNTER — TRANSCRIPTION ENCOUNTER (OUTPATIENT)
Age: 88
End: 2024-12-11

## 2024-12-11 ENCOUNTER — NON-APPOINTMENT (OUTPATIENT)
Age: 88
End: 2024-12-11

## 2024-12-11 DIAGNOSIS — J40 BRONCHITIS, NOT SPECIFIED AS ACUTE OR CHRONIC: ICD-10-CM

## 2024-12-11 DIAGNOSIS — R05.9 COUGH, UNSPECIFIED: ICD-10-CM

## 2024-12-12 ENCOUNTER — LABORATORY RESULT (OUTPATIENT)
Age: 88
End: 2024-12-12

## 2024-12-13 ENCOUNTER — NON-APPOINTMENT (OUTPATIENT)
Age: 88
End: 2024-12-13

## 2024-12-18 ENCOUNTER — APPOINTMENT (OUTPATIENT)
Dept: HOME HEALTH SERVICES | Facility: HOME HEALTH | Age: 88
End: 2024-12-18

## 2024-12-18 VITALS
SYSTOLIC BLOOD PRESSURE: 120 MMHG | TEMPERATURE: 97 F | RESPIRATION RATE: 16 BRPM | HEART RATE: 69 BPM | DIASTOLIC BLOOD PRESSURE: 68 MMHG | OXYGEN SATURATION: 98 %

## 2024-12-18 RX ORDER — AMOXICILLIN AND CLAVULANATE POTASSIUM 875; 125 MG/1; MG/1
875-125 TABLET, COATED ORAL
Qty: 14 | Refills: 0 | Status: ACTIVE | COMMUNITY
Start: 2024-12-11

## 2024-12-18 RX ORDER — BENZONATATE 100 MG/1
100 CAPSULE ORAL
Qty: 14 | Refills: 0 | Status: ACTIVE | COMMUNITY
Start: 2024-12-11

## 2025-01-03 ENCOUNTER — APPOINTMENT (OUTPATIENT)
Dept: HEART AND VASCULAR | Facility: CLINIC | Age: 89
End: 2025-01-03

## 2025-01-17 ENCOUNTER — NON-APPOINTMENT (OUTPATIENT)
Age: 89
End: 2025-01-17

## 2025-01-17 ENCOUNTER — APPOINTMENT (OUTPATIENT)
Dept: HEART AND VASCULAR | Facility: CLINIC | Age: 89
End: 2025-01-17
Payer: MEDICARE

## 2025-01-17 VITALS — SYSTOLIC BLOOD PRESSURE: 211 MMHG | DIASTOLIC BLOOD PRESSURE: 91 MMHG | HEART RATE: 67 BPM

## 2025-01-17 VITALS
TEMPERATURE: 97.8 F | OXYGEN SATURATION: 97 % | DIASTOLIC BLOOD PRESSURE: 92 MMHG | HEIGHT: 60 IN | WEIGHT: 140 LBS | HEART RATE: 73 BPM | BODY MASS INDEX: 27.48 KG/M2 | SYSTOLIC BLOOD PRESSURE: 215 MMHG

## 2025-01-17 DIAGNOSIS — I10 ESSENTIAL (PRIMARY) HYPERTENSION: ICD-10-CM

## 2025-01-17 DIAGNOSIS — Z95.0 PRESENCE OF CARDIAC PACEMAKER: ICD-10-CM

## 2025-01-17 DIAGNOSIS — D86.0 SARCOIDOSIS OF LUNG: ICD-10-CM

## 2025-01-17 DIAGNOSIS — I48.91 UNSPECIFIED ATRIAL FIBRILLATION: ICD-10-CM

## 2025-01-17 DIAGNOSIS — I50.9 HEART FAILURE, UNSPECIFIED: ICD-10-CM

## 2025-01-17 PROCEDURE — 99214 OFFICE O/P EST MOD 30 MIN: CPT

## 2025-01-21 LAB
ALBUMIN SERPL ELPH-MCNC: 4 G/DL
ALP BLD-CCNC: 77 U/L
ALT SERPL-CCNC: 15 U/L
ANION GAP SERPL CALC-SCNC: 13 MMOL/L
AST SERPL-CCNC: 23 U/L
BILIRUB SERPL-MCNC: 0.5 MG/DL
BUN SERPL-MCNC: 16 MG/DL
CALCIUM SERPL-MCNC: 9.3 MG/DL
CHLORIDE SERPL-SCNC: 106 MMOL/L
CO2 SERPL-SCNC: 23 MMOL/L
CREAT SERPL-MCNC: 1.25 MG/DL
EGFR: 41 ML/MIN/1.73M2
GLUCOSE SERPL-MCNC: 89 MG/DL
MAGNESIUM SERPL-MCNC: 2.2 MG/DL
NT-PROBNP SERPL-MCNC: 1251 PG/ML
POTASSIUM SERPL-SCNC: 5 MMOL/L
PROT SERPL-MCNC: 7 G/DL
SODIUM SERPL-SCNC: 142 MMOL/L

## 2025-01-23 ENCOUNTER — OUTPATIENT (OUTPATIENT)
Dept: OUTPATIENT SERVICES | Facility: HOSPITAL | Age: 89
LOS: 1 days | End: 2025-01-23

## 2025-01-23 ENCOUNTER — APPOINTMENT (OUTPATIENT)
Dept: HEART AND VASCULAR | Facility: CLINIC | Age: 89
End: 2025-01-23

## 2025-01-23 ENCOUNTER — APPOINTMENT (OUTPATIENT)
Dept: ULTRASOUND IMAGING | Facility: HOSPITAL | Age: 89
End: 2025-01-23

## 2025-01-23 PROCEDURE — 93925 LOWER EXTREMITY STUDY: CPT

## 2025-01-23 PROCEDURE — 93925 LOWER EXTREMITY STUDY: CPT | Mod: 26

## 2025-01-24 ENCOUNTER — NON-APPOINTMENT (OUTPATIENT)
Age: 89
End: 2025-01-24

## 2025-01-27 ENCOUNTER — APPOINTMENT (OUTPATIENT)
Dept: HEART AND VASCULAR | Facility: CLINIC | Age: 89
End: 2025-01-27

## 2025-01-27 PROCEDURE — 93294 REM INTERROG EVL PM/LDLS PM: CPT

## 2025-01-27 PROCEDURE — 93296 REM INTERROG EVL PM/IDS: CPT

## 2025-02-03 ENCOUNTER — APPOINTMENT (OUTPATIENT)
Dept: ULTRASOUND IMAGING | Facility: HOSPITAL | Age: 89
End: 2025-02-03
Payer: MEDICARE

## 2025-02-04 ENCOUNTER — NON-APPOINTMENT (OUTPATIENT)
Age: 89
End: 2025-02-04

## 2025-02-04 ENCOUNTER — OUTPATIENT (OUTPATIENT)
Dept: OUTPATIENT SERVICES | Facility: HOSPITAL | Age: 89
LOS: 1 days | End: 2025-02-04

## 2025-02-04 PROCEDURE — 93970 EXTREMITY STUDY: CPT

## 2025-02-04 PROCEDURE — 93970 EXTREMITY STUDY: CPT | Mod: 26

## 2025-02-05 ENCOUNTER — NON-APPOINTMENT (OUTPATIENT)
Age: 89
End: 2025-02-05

## 2025-02-12 ENCOUNTER — NON-APPOINTMENT (OUTPATIENT)
Age: 89
End: 2025-02-12

## 2025-02-12 ENCOUNTER — APPOINTMENT (OUTPATIENT)
Dept: HEART AND VASCULAR | Facility: CLINIC | Age: 89
End: 2025-02-12
Payer: MEDICARE

## 2025-02-12 VITALS
HEIGHT: 60 IN | BODY MASS INDEX: 26.8 KG/M2 | TEMPERATURE: 97.5 F | HEART RATE: 67 BPM | WEIGHT: 136.5 LBS | DIASTOLIC BLOOD PRESSURE: 78 MMHG | SYSTOLIC BLOOD PRESSURE: 160 MMHG | OXYGEN SATURATION: 97 %

## 2025-02-12 PROCEDURE — 93280 PM DEVICE PROGR EVAL DUAL: CPT

## 2025-02-13 ENCOUNTER — APPOINTMENT (OUTPATIENT)
Dept: INTERNAL MEDICINE | Facility: CLINIC | Age: 89
End: 2025-02-13
Payer: MEDICARE

## 2025-02-13 VITALS
TEMPERATURE: 97.1 F | WEIGHT: 135 LBS | HEIGHT: 60 IN | HEART RATE: 64 BPM | DIASTOLIC BLOOD PRESSURE: 84 MMHG | BODY MASS INDEX: 26.5 KG/M2 | OXYGEN SATURATION: 95 % | SYSTOLIC BLOOD PRESSURE: 166 MMHG

## 2025-02-13 VITALS — DIASTOLIC BLOOD PRESSURE: 63 MMHG | SYSTOLIC BLOOD PRESSURE: 178 MMHG

## 2025-02-13 DIAGNOSIS — Z86.79 PERSONAL HISTORY OF OTHER DISEASES OF THE CIRCULATORY SYSTEM: ICD-10-CM

## 2025-02-13 PROBLEM — Z00.01 ABNORMAL WELLNESS EXAM: Status: ACTIVE | Noted: 2025-02-13

## 2025-02-13 PROBLEM — I87.2 VENOUS INSUFFICIENCY: Status: ACTIVE | Noted: 2025-02-13

## 2025-02-13 PROCEDURE — 99213 OFFICE O/P EST LOW 20 MIN: CPT | Mod: 25

## 2025-02-13 PROCEDURE — G0439: CPT

## 2025-02-13 PROCEDURE — G2211 COMPLEX E/M VISIT ADD ON: CPT

## 2025-02-20 ENCOUNTER — APPOINTMENT (OUTPATIENT)
Dept: HOME HEALTH SERVICES | Facility: HOME HEALTH | Age: 89
End: 2025-02-20
Payer: MEDICARE

## 2025-02-20 VITALS
OXYGEN SATURATION: 99 % | DIASTOLIC BLOOD PRESSURE: 64 MMHG | SYSTOLIC BLOOD PRESSURE: 134 MMHG | HEART RATE: 57 BPM | TEMPERATURE: 97.2 F

## 2025-02-20 DIAGNOSIS — R35.0 FREQUENCY OF MICTURITION: ICD-10-CM

## 2025-02-20 DIAGNOSIS — D86.0 SARCOIDOSIS OF LUNG: ICD-10-CM

## 2025-02-20 DIAGNOSIS — S02.40FA ZYGOMATIC FRACTURE, LEFT SIDE, INITIAL ENCOUNTER FOR CLOSED FRACTURE: ICD-10-CM

## 2025-02-20 DIAGNOSIS — I50.9 HEART FAILURE, UNSPECIFIED: ICD-10-CM

## 2025-02-20 DIAGNOSIS — M19.072 PRIMARY OSTEOARTHRITIS, RIGHT ANKLE AND FOOT: ICD-10-CM

## 2025-02-20 DIAGNOSIS — I10 ESSENTIAL (PRIMARY) HYPERTENSION: ICD-10-CM

## 2025-02-20 DIAGNOSIS — R39.9 UNSPECIFIED SYMPTOMS AND SIGNS INVOLVING THE GENITOURINARY SYSTEM: ICD-10-CM

## 2025-02-20 DIAGNOSIS — Z95.0 PRESENCE OF CARDIAC PACEMAKER: ICD-10-CM

## 2025-02-20 DIAGNOSIS — Z00.01 ENCOUNTER FOR GENERAL ADULT MEDICAL EXAMINATION WITH ABNORMAL FINDINGS: ICD-10-CM

## 2025-02-20 DIAGNOSIS — I48.91 UNSPECIFIED ATRIAL FIBRILLATION: ICD-10-CM

## 2025-02-20 DIAGNOSIS — Z91.81 HISTORY OF FALLING: ICD-10-CM

## 2025-02-20 DIAGNOSIS — I87.2 VENOUS INSUFFICIENCY (CHRONIC) (PERIPHERAL): ICD-10-CM

## 2025-02-20 DIAGNOSIS — M19.071 PRIMARY OSTEOARTHRITIS, RIGHT ANKLE AND FOOT: ICD-10-CM

## 2025-02-20 DIAGNOSIS — Z87.09 PERSONAL HISTORY OF OTHER DISEASES OF THE RESPIRATORY SYSTEM: ICD-10-CM

## 2025-02-20 DIAGNOSIS — E03.9 HYPOTHYROIDISM, UNSPECIFIED: ICD-10-CM

## 2025-02-20 PROCEDURE — 99349 HOME/RES VST EST MOD MDM 40: CPT

## 2025-02-21 PROBLEM — R35.0 FREQUENT URINATION: Status: ACTIVE | Noted: 2025-02-21

## 2025-02-21 PROBLEM — Z00.01 ABNORMAL WELLNESS EXAM: Status: RESOLVED | Noted: 2025-02-13 | Resolved: 2025-02-21

## 2025-02-21 PROBLEM — I10 HTN (HYPERTENSION): Noted: 2024-11-22

## 2025-02-21 PROBLEM — Z91.81 HISTORY OF FALL: Status: RESOLVED | Noted: 2024-08-29 | Resolved: 2025-02-21

## 2025-02-21 PROBLEM — Z87.09 HISTORY OF BRONCHITIS: Status: RESOLVED | Noted: 2024-12-11 | Resolved: 2025-02-21

## 2025-02-21 PROBLEM — R39.9 UTI SYMPTOMS: Status: ACTIVE | Noted: 2025-02-21

## 2025-02-24 ENCOUNTER — LABORATORY RESULT (OUTPATIENT)
Age: 89
End: 2025-02-24

## 2025-03-10 NOTE — ED ADULT TRIAGE NOTE - SPO2 (%)
Caller: Patient    Doctor/Office: podiatry     Call regarding :  calling to schedule appt    Call was transferred to: podiatry   [phone]

## 2025-04-04 ENCOUNTER — NON-APPOINTMENT (OUTPATIENT)
Age: 89
End: 2025-04-04

## 2025-04-10 ENCOUNTER — APPOINTMENT (OUTPATIENT)
Dept: PULMONOLOGY | Facility: CLINIC | Age: 89
End: 2025-04-10

## 2025-04-10 DIAGNOSIS — D86.0 SARCOIDOSIS OF LUNG: ICD-10-CM

## 2025-04-11 ENCOUNTER — APPOINTMENT (OUTPATIENT)
Dept: HEART AND VASCULAR | Facility: CLINIC | Age: 89
End: 2025-04-11

## 2025-04-11 DIAGNOSIS — I25.10 ATHEROSCLEROTIC HEART DISEASE OF NATIVE CORONARY ARTERY W/OUT ANGINA PECTORIS: ICD-10-CM

## 2025-04-11 DIAGNOSIS — I48.91 UNSPECIFIED ATRIAL FIBRILLATION: ICD-10-CM

## 2025-04-11 DIAGNOSIS — I50.9 HEART FAILURE, UNSPECIFIED: ICD-10-CM

## 2025-04-11 DIAGNOSIS — I10 ESSENTIAL (PRIMARY) HYPERTENSION: ICD-10-CM

## 2025-04-14 ENCOUNTER — NON-APPOINTMENT (OUTPATIENT)
Age: 89
End: 2025-04-14

## 2025-04-16 ENCOUNTER — NON-APPOINTMENT (OUTPATIENT)
Age: 89
End: 2025-04-16

## 2025-04-22 ENCOUNTER — NON-APPOINTMENT (OUTPATIENT)
Age: 89
End: 2025-04-22

## 2025-04-28 ENCOUNTER — APPOINTMENT (OUTPATIENT)
Dept: HEART AND VASCULAR | Facility: CLINIC | Age: 89
End: 2025-04-28

## 2025-05-13 ENCOUNTER — NON-APPOINTMENT (OUTPATIENT)
Age: 89
End: 2025-05-13

## 2025-05-20 ENCOUNTER — NON-APPOINTMENT (OUTPATIENT)
Age: 89
End: 2025-05-20

## 2025-05-20 ENCOUNTER — APPOINTMENT (OUTPATIENT)
Dept: HEART AND VASCULAR | Facility: CLINIC | Age: 89
End: 2025-05-20
Payer: MEDICARE

## 2025-05-20 PROCEDURE — 93294 REM INTERROG EVL PM/LDLS PM: CPT

## 2025-05-20 PROCEDURE — 93296 REM INTERROG EVL PM/IDS: CPT

## 2025-05-23 ENCOUNTER — NON-APPOINTMENT (OUTPATIENT)
Age: 89
End: 2025-05-23

## 2025-06-15 NOTE — ED PROVIDER NOTE - PROGRESS NOTE DETAILS
oral
Pt felt improved sx after meds.  BP lowered w/o meds to 140's.  Labs wnl, ua w + bacteria but o/w neg - no concern for uti.  CT/cta neg.  Pt discussed and eval by stroke team - will admit tele, Dr Rick.  Dr Olivo and Dr Riggs updated about pt's visit and dispo.

## 2025-08-21 ENCOUNTER — APPOINTMENT (OUTPATIENT)
Dept: HEART AND VASCULAR | Facility: CLINIC | Age: 89
End: 2025-08-21